# Patient Record
Sex: FEMALE | Race: WHITE | NOT HISPANIC OR LATINO | Employment: OTHER | URBAN - NONMETROPOLITAN AREA
[De-identification: names, ages, dates, MRNs, and addresses within clinical notes are randomized per-mention and may not be internally consistent; named-entity substitution may affect disease eponyms.]

---

## 2021-08-31 ENCOUNTER — HOSPITAL ENCOUNTER (OUTPATIENT)
Dept: INFUSION THERAPY | Facility: HOSPITAL | Age: 66
Discharge: HOME | End: 2021-08-31
Admitting: NURSE PRACTITIONER

## 2021-08-31 VITALS
SYSTOLIC BLOOD PRESSURE: 100 MMHG | RESPIRATION RATE: 20 BRPM | TEMPERATURE: 97.8 F | HEART RATE: 60 BPM | DIASTOLIC BLOOD PRESSURE: 70 MMHG

## 2021-08-31 DIAGNOSIS — U07.1 COVID-19: Primary | ICD-10-CM

## 2021-08-31 PROCEDURE — M0243 CASIRIVI AND IMDEVI INFUSION: HCPCS | Performed by: NURSE PRACTITIONER

## 2021-08-31 PROCEDURE — 25010000006 INJECTION, CASIRIVIMAB AND IMDEVIMAB, 1200 MG: Performed by: NURSE PRACTITIONER

## 2021-08-31 RX ORDER — METHYLPREDNISOLONE SODIUM SUCCINATE 125 MG/2ML
125 INJECTION, POWDER, LYOPHILIZED, FOR SOLUTION INTRAMUSCULAR; INTRAVENOUS AS NEEDED
Status: DISCONTINUED | OUTPATIENT
Start: 2021-08-31 | End: 2021-09-02 | Stop reason: HOSPADM

## 2021-08-31 RX ORDER — DIPHENHYDRAMINE HYDROCHLORIDE 50 MG/ML
50 INJECTION INTRAMUSCULAR; INTRAVENOUS AS NEEDED
Status: CANCELLED | OUTPATIENT
Start: 2021-08-31

## 2021-08-31 RX ORDER — EPINEPHRINE 1 MG/ML
0.3 INJECTION, SOLUTION INTRAMUSCULAR; SUBCUTANEOUS AS NEEDED
Status: CANCELLED | OUTPATIENT
Start: 2021-08-31

## 2021-08-31 RX ORDER — METHYLPREDNISOLONE SODIUM SUCCINATE 125 MG/2ML
125 INJECTION, POWDER, LYOPHILIZED, FOR SOLUTION INTRAMUSCULAR; INTRAVENOUS AS NEEDED
Status: CANCELLED | OUTPATIENT
Start: 2021-08-31

## 2021-08-31 RX ORDER — DIPHENHYDRAMINE HYDROCHLORIDE 50 MG/ML
50 INJECTION INTRAMUSCULAR; INTRAVENOUS AS NEEDED
Status: DISCONTINUED | OUTPATIENT
Start: 2021-08-31 | End: 2021-09-02 | Stop reason: HOSPADM

## 2021-08-31 RX ORDER — EPINEPHRINE 1 MG/ML
0.3 INJECTION, SOLUTION INTRAMUSCULAR; SUBCUTANEOUS AS NEEDED
Status: DISCONTINUED | OUTPATIENT
Start: 2021-08-31 | End: 2021-09-02 | Stop reason: HOSPADM

## 2021-08-31 RX ADMIN — CASIRIVIMAB AND IMDEVIMAB: 600; 600 INJECTION, SOLUTION, CONCENTRATE INTRAVENOUS at 11:55

## 2021-08-31 NOTE — NURSING NOTE
Pt presented to unit for [facility] infusion. Teaching performed on med including s/s of reaction to report. Covid isolation precautions in use.

## 2021-08-31 NOTE — NURSING NOTE
No s/s of reaction ntd. Denies any c/o. Teaching performed with discharge instructions provided including s/s of reaction. Discharged home

## 2022-08-15 ENCOUNTER — NURSE NAVIGATOR (OUTPATIENT)
Dept: PULMONOLOGY | Facility: CLINIC | Age: 67
End: 2022-08-15

## 2022-08-15 ENCOUNTER — OFFICE VISIT (OUTPATIENT)
Dept: PULMONOLOGY | Facility: CLINIC | Age: 67
End: 2022-08-15

## 2022-08-15 VITALS
BODY MASS INDEX: 22.54 KG/M2 | HEIGHT: 63 IN | TEMPERATURE: 96.6 F | WEIGHT: 127.2 LBS | OXYGEN SATURATION: 95 % | DIASTOLIC BLOOD PRESSURE: 90 MMHG | SYSTOLIC BLOOD PRESSURE: 158 MMHG | HEART RATE: 89 BPM

## 2022-08-15 DIAGNOSIS — R63.4 WEIGHT LOSS, UNINTENTIONAL: ICD-10-CM

## 2022-08-15 DIAGNOSIS — J44.9 COPD MIXED TYPE: ICD-10-CM

## 2022-08-15 DIAGNOSIS — R91.1 PULMONARY NODULE: Primary | ICD-10-CM

## 2022-08-15 DIAGNOSIS — Z95.0 HISTORY OF CARDIAC PACEMAKER: ICD-10-CM

## 2022-08-15 DIAGNOSIS — Z72.0 TOBACCO ABUSE: ICD-10-CM

## 2022-08-15 DIAGNOSIS — R59.0 HILAR ADENOPATHY: ICD-10-CM

## 2022-08-15 PROCEDURE — 99205 OFFICE O/P NEW HI 60 MIN: CPT | Performed by: INTERNAL MEDICINE

## 2022-08-15 RX ORDER — BENAZEPRIL HYDROCHLORIDE 20 MG/1
TABLET ORAL
COMMUNITY
Start: 2022-06-10

## 2022-08-15 RX ORDER — ATORVASTATIN CALCIUM 40 MG/1
TABLET, FILM COATED ORAL
COMMUNITY
Start: 2022-07-29

## 2022-08-15 RX ORDER — ALBUTEROL SULFATE 90 UG/1
AEROSOL, METERED RESPIRATORY (INHALATION)
COMMUNITY
Start: 2022-06-10

## 2022-08-15 RX ORDER — MONTELUKAST SODIUM 10 MG/1
TABLET ORAL
COMMUNITY
Start: 2022-07-29

## 2022-08-15 RX ORDER — LEVOCETIRIZINE DIHYDROCHLORIDE 5 MG/1
TABLET, FILM COATED ORAL
COMMUNITY
Start: 2022-08-14

## 2022-08-15 RX ORDER — ATENOLOL 50 MG/1
TABLET ORAL
COMMUNITY
Start: 2022-07-25

## 2022-08-15 NOTE — PROGRESS NOTES
Nurse Navigator met with patient on this date.  Pt was referred to lung nodule clinic to be seen in new consultation with Dr. Murrieta following an abnormal chest CT revealed a 1.1cm RLL nodule.  Dr. Murrieta reviewed and gave printed education materials on lung nodules.  Dr. Murrieta's recommendations and plans are to complete a bronchoscopy and PET scan.  The role of the NN introduced to patient and family.  NN contact information provided and encouraged for pt to call with any questions or concerns.  Pt verbalized understanding and is in agreement with plan of care.

## 2022-08-15 NOTE — PROGRESS NOTES
PULMONARY  NOTE    Chief Complaint     Pulmonary nodule, mediastinal adenopathy, tobacco abuse, weight loss, recent respiratory tract infection    History of Present Illness     67-year-old female referred for evaluation of an abnormal CT scan of the chest    She has a long history of tobacco abuse which is ongoing  Does not have plans for smoking cessation at this time    She had acute exacerbation of COPD earlier this month  She underwent a chest x-ray which was abnormal followed by a CT scan of the chest  The results are as noted below    She feels that she is doing much better after her treatment and is now using Anoro with albuterol on an as-needed basis  She has not had to use the albuterol very often since she has improved    She has a regular cough but she is never had hemoptysis    She has experienced some weight loss but she is not sure how much weight she has actually lost    Patient Active Problem List   Diagnosis   • COVID-19   • Pulmonary nodule (11mm RLL)   • Right hilar adenopathy   • COPD (severity ?)   • Tobacco abuse   • Weight loss, unintentional (How much ?)   • History of cardiac pacemaker     No Known Allergies    Current Outpatient Medications:   •  albuterol sulfate  (90 Base) MCG/ACT inhaler, , Disp: , Rfl:   •  Anoro Ellipta 62.5-25 MCG/INH aerosol powder  inhaler, , Disp: , Rfl:   •  atenolol (TENORMIN) 50 MG tablet, , Disp: , Rfl:   •  atorvastatin (LIPITOR) 40 MG tablet, , Disp: , Rfl:   •  benazepril (LOTENSIN) 20 MG tablet, , Disp: , Rfl:   •  Diclofenac Sodium (VOLTAREN) 1 % gel gel, , Disp: , Rfl:   •  levocetirizine (XYZAL) 5 MG tablet, , Disp: , Rfl:   •  montelukast (SINGULAIR) 10 MG tablet, , Disp: , Rfl:   MEDICATION LIST AND ALLERGIES REVIEWED.    Family History   Problem Relation Age of Onset   • Cancer Mother    • Heart attack Father      Social History     Tobacco Use   • Smoking status: Current Every Day Smoker     Packs/day: 0.50     Years: 20.00     Pack years:  "10.00   • Smokeless tobacco: Never Used   • Tobacco comment: 1  pack per day history   Vaping Use   • Vaping Use: Never used   Substance Use Topics   • Alcohol use: Never   • Drug use: Never     Social History     Social History Narrative        Considered disabled    Continues to smoke.  Began smoking at age 18    Typically has always smoked less than 1 pack cigarettes per day    No smoking cessation plans, yet     FAMILY AND SOCIAL HISTORY REVIEWED.    Review of Systems  ALSO REFER TO SCANNED ROS SHEET FROM SAME DATE.    /90 (BP Location: Left arm, Patient Position: Sitting)   Pulse 89   Temp 96.6 °F (35.9 °C)   Ht 160 cm (63\")   Wt 57.7 kg (127 lb 3.2 oz)   SpO2 95%   BMI 22.53 kg/m²   Physical Exam  Vitals and nursing note reviewed.   Constitutional:       General: She is not in acute distress.     Appearance: She is well-developed. She is not diaphoretic.   HENT:      Head: Normocephalic and atraumatic.   Neck:      Thyroid: No thyromegaly.   Cardiovascular:      Rate and Rhythm: Normal rate and regular rhythm.      Heart sounds: No murmur heard.  Pulmonary:      Effort: Pulmonary effort is normal.      Breath sounds: Normal breath sounds. No stridor.   Chest:   Breasts:      Right: No supraclavicular adenopathy.      Left: No supraclavicular adenopathy.       Abdominal:      Palpations: Abdomen is soft.   Lymphadenopathy:      Cervical: No cervical adenopathy.      Upper Body:      Right upper body: No supraclavicular or epitrochlear adenopathy.      Left upper body: No supraclavicular or epitrochlear adenopathy.   Skin:     General: Skin is warm and dry.   Neurological:      Mental Status: She is alert and oriented to person, place, and time.   Psychiatric:         Behavior: Behavior normal.         Results     CT scan of the chest reviewed on PACS  This is an outside film  Quite bulky right hilar and right sided mediastinal adenopathy noted  Approximately 11 mm peripheral right lower lobe " noncalcified well-circumscribed pulmonary nodule      There is no immunization history on file for this patient.  Problem List       ICD-10-CM ICD-9-CM   1. Pulmonary nodule (11mm RLL)  R91.1 793.11   2. Right hilar adenopathy  R59.0 785.6   3. COPD (severity ?)  J44.9 496   4. Tobacco abuse  Z72.0 305.1   5. Weight loss, unintentional (How much ?)  R63.4 783.21   6. History of cardiac pacemaker  Z95.0 V12.50     V45.01       Discussion     We discussed her chest imaging  Her family accompanied her to the office today  She is a pulmonary nodule with pathologically enlarged hilar and right sided mediastinal lymphadenopathy  This is certainly a worrisome picture for a primary bronchogenic carcinoma with local or regional spread  The pulmonary nodule could be benign, however, and other etiologies for her mediastinal lymphadenopathy need to be considered, such as lymphoma  This would be an atypical picture for a post COVID infection    We discussed work-up  At some point a PET scan will probably be useful but will not establish a diagnosis  We discussed biopsy options  This includes CT FNA, surgical lung biopsies and bronchoscopy with biopsy  We discussed the risk and benefits of these options including the risks of pneumothorax and bleeding    I cannot get at the right lung lesion without navigational assistance but we should be able to get diagnostic samples from the lymph nodes through a bronchoscopy with EBUS  This can be performed in [place]  We will try to get that set up for next week    I will submit a request for a PET scan although that is not going to be pivotal or diagnostic until we have a tissue diagnosis of    She is going to remain on the same bronchodilator regimen including Anoro with albuterol    Would recommend smoking cessation    I will plan to see her back after the above    Level of service justified based on 63 minutes spent in patient care on this date of service including, but not limited to:  preparing to see the patient, obtaining and/or reviewing history, performing medically appropriate examination, ordering tests/medicine/procedures, independently interpreting results, documenting clinical information in EHR, and counseling/education of patient/family/caregiver (excluding time spent on other separate services such as performing procedures or test interpretation, if applicable). (Level 4 45-59 minutes; Level 5 60-74 minutes)    Amari Murrieta MD  Note electronically signed    CC: Cristofer Pearce MD

## 2022-08-16 ENCOUNTER — NURSE NAVIGATOR (OUTPATIENT)
Dept: PULMONOLOGY | Facility: CLINIC | Age: 67
End: 2022-08-16

## 2022-08-16 DIAGNOSIS — Z00.6 EXAMINATION FOR NORMAL COMPARISON OR CONTROL IN CLINICAL RESEARCH: Primary | ICD-10-CM

## 2022-08-16 NOTE — PROGRESS NOTES
Nurse Navigator called to patient on this date.  Informed patient of PAT appointment to be Friday, 8/19 at 3:15pm at the outpatient surgery center and for her bronchoscopy appointment to be Monday, 8/22 at 7:00am, also at the outpatient surgery center.  All information of dates/times confirmed and read back by patient to NN.  NN contact information provided and encouraged for patient to call with any questions or concerns.  Pt verbalized understanding and is in agreement with plan of care.

## 2022-08-17 NOTE — DISCHARGE INSTRUCTIONS
8/22/22      0700   ARRIVAL TIME   TAKE the following medications the morning of surgery:  All heart or blood pressure medications    HOLD all diabetic medications the morning of surgery as ordered by physician.    Please discontinue all blood thinners and anticoagulants (except aspirin) prior to surgery as per your surgeon and cardiologist instructions.  Aspirin may be continued up to the day prior to surgery.     CHLORHEXIDINE CLOTHS GIVEN WITH INSTRUCTIONS AND FORM TO RETURN TO HOSPITAL, IF APPLICABLE.    General Instructions:  Do not eat or drink after midnight: includes water, mints, or gum. You may brush your teeth.  Dental appliances that are removable must be taken out day of surgery.  Do not smoke, chew tobacco, or drink alcohol.  Bring medications in original bottles, any inhalers and if applicable your C-PAP/BI-PAP machine.  Bring any papers given to you in the doctor's office.  Wear clean comfortable clothes and socks.  Do not wear contact lenses or make-up. Bring a case for your glasses if applicable.  Bring crutches or walker if applicable.  Leave all other valuables and jewelry at home.    If you were given a blood bank ID arm band remember to bring it with you the day of surgery.    Preventing a Surgical Site Infection:  Shower the night before surgery (unless instructed other wise) using a fresh bar of anti-bacterial soap (such as Dial) and clean washcloth. Dry with a clean towel and dress in clean clothing.  For 2 to 3 days before surgery, avoid shaving with a razor near where you will have surgery because the razor can irritate skin and make it easier to develop an infection. Ask your surgeon if you will be receiving antibiotics prior to surgery.  Make sure you, your family, and all healthcare providers clear their hands with soap and water or an alcohol-based hand  before caring for you or your wound.  If at all possible, quit smoking as many days before surgery as you can.    Day of  surgery:  Upon arrival, a Pre-op nurse and Anesthesiologist will review your health history, obtain vital signs, and answer questions you may have. The only belongings needed at this time will be your home medications and if applicable your C-PAP/BI-PAP machine. If you are staying overnight your family can leave the rest of your belongings in the car and bring them to your room later. A Pre-op nurse will start an IV and you may receive medication in preparation for surgery, including something to help you relax. Your family will be able to see you in the Pre-op area. While you are in surgery your family should notify the waiting room  if they leave the waiting room area and provide a contact phone number.    Please be aware that surgery does come with discomfort. We want to make every effort to control your discomfort so please discuss any uncontrolled symptoms with your nurse. Your doctor will most likely have prescribed pain medications.    If you are going home after surgery you will receive individualized written care instructions before being discharged. A responsible adult must drive you to and from the hospital on the day of surgery and stay with you for 24 hours.    If you are staying overnight following surgery, you will be transported to your hospital room following the recovery period.  [facility] has all private rooms.    If you have any questions please call Pre-Admission Testing at [phone].  Deductibles and co-payments are collected on the day of service. Please be prepared to pay the required co-pay, deductible or deposit on the day of service as defined by your plan.    A RESPONSIBLE PERSON MUST REMAIN IN THE WAITING ROOM DURING YOUR PROCEDURE AND A RESPONSIBLE  MUST BE AVAILABLE UPON YOUR DISCHARGE.

## 2022-08-19 ENCOUNTER — PRE-ADMISSION TESTING (OUTPATIENT)
Dept: PREADMISSION TESTING | Facility: HOSPITAL | Age: 67
End: 2022-08-19

## 2022-08-19 LAB
ANION GAP SERPL CALCULATED.3IONS-SCNC: 11.7 MMOL/L (ref 5–15)
APTT PPP: 27 SECONDS (ref 26.5–34.5)
BUN SERPL-MCNC: 10 MG/DL (ref 8–23)
BUN/CREAT SERPL: 11.9 (ref 7–25)
CALCIUM SPEC-SCNC: 9.8 MG/DL (ref 8.6–10.5)
CHLORIDE SERPL-SCNC: 104 MMOL/L (ref 98–107)
CO2 SERPL-SCNC: 21.3 MMOL/L (ref 22–29)
CREAT SERPL-MCNC: 0.84 MG/DL (ref 0.57–1)
DEPRECATED RDW RBC AUTO: 43.8 FL (ref 37–54)
EGFRCR SERPLBLD CKD-EPI 2021: 77.2 ML/MIN/1.73
ERYTHROCYTE [DISTWIDTH] IN BLOOD BY AUTOMATED COUNT: 12.7 % (ref 12.3–15.4)
GLUCOSE SERPL-MCNC: 100 MG/DL (ref 65–99)
HCT VFR BLD AUTO: 39.5 % (ref 34–46.6)
HGB BLD-MCNC: 13.1 G/DL (ref 12–15.9)
MCH RBC QN AUTO: 30.8 PG (ref 26.6–33)
MCHC RBC AUTO-ENTMCNC: 33.2 G/DL (ref 31.5–35.7)
MCV RBC AUTO: 92.9 FL (ref 79–97)
PLATELET # BLD AUTO: 223 10*3/MM3 (ref 140–450)
PMV BLD AUTO: 9.6 FL (ref 6–12)
POTASSIUM SERPL-SCNC: 5 MMOL/L (ref 3.5–5.2)
RBC # BLD AUTO: 4.25 10*6/MM3 (ref 3.77–5.28)
SODIUM SERPL-SCNC: 137 MMOL/L (ref 136–145)
WBC NRBC COR # BLD: 7.68 10*3/MM3 (ref 3.4–10.8)

## 2022-08-19 PROCEDURE — 93005 ELECTROCARDIOGRAM TRACING: CPT

## 2022-08-19 PROCEDURE — 85730 THROMBOPLASTIN TIME PARTIAL: CPT

## 2022-08-19 PROCEDURE — 85027 COMPLETE CBC AUTOMATED: CPT

## 2022-08-19 PROCEDURE — 36415 COLL VENOUS BLD VENIPUNCTURE: CPT

## 2022-08-19 PROCEDURE — 80048 BASIC METABOLIC PNL TOTAL CA: CPT

## 2022-08-20 LAB
QT INTERVAL: 392 MS
QTC INTERVAL: 394 MS

## 2022-08-22 ENCOUNTER — HOSPITAL ENCOUNTER (OUTPATIENT)
Facility: HOSPITAL | Age: 67
Setting detail: HOSPITAL OUTPATIENT SURGERY
Discharge: HOME | End: 2022-08-22
Attending: INTERNAL MEDICINE | Admitting: INTERNAL MEDICINE

## 2022-08-22 ENCOUNTER — ANESTHESIA EVENT (OUTPATIENT)
Dept: PERIOP | Facility: HOSPITAL | Age: 67
End: 2022-08-22

## 2022-08-22 ENCOUNTER — ANESTHESIA (OUTPATIENT)
Dept: PERIOP | Facility: HOSPITAL | Age: 67
End: 2022-08-22

## 2022-08-22 VITALS
HEIGHT: 63 IN | HEART RATE: 97 BPM | DIASTOLIC BLOOD PRESSURE: 94 MMHG | TEMPERATURE: 98 F | BODY MASS INDEX: 22.5 KG/M2 | OXYGEN SATURATION: 95 % | SYSTOLIC BLOOD PRESSURE: 150 MMHG | WEIGHT: 127 LBS | RESPIRATION RATE: 20 BRPM

## 2022-08-22 DIAGNOSIS — Z72.0 TOBACCO ABUSE: ICD-10-CM

## 2022-08-22 DIAGNOSIS — J44.9 COPD MIXED TYPE: ICD-10-CM

## 2022-08-22 DIAGNOSIS — R59.0 HILAR ADENOPATHY: ICD-10-CM

## 2022-08-22 DIAGNOSIS — Z95.0 HISTORY OF CARDIAC PACEMAKER: ICD-10-CM

## 2022-08-22 DIAGNOSIS — R91.1 PULMONARY NODULE: ICD-10-CM

## 2022-08-22 DIAGNOSIS — R63.4 WEIGHT LOSS, UNINTENTIONAL: ICD-10-CM

## 2022-08-22 PROCEDURE — 88360 TUMOR IMMUNOHISTOCHEM/MANUAL: CPT

## 2022-08-22 PROCEDURE — 88112 CYTOPATH CELL ENHANCE TECH: CPT

## 2022-08-22 PROCEDURE — 31625 BRONCHOSCOPY W/BIOPSY(S): CPT | Performed by: INTERNAL MEDICINE

## 2022-08-22 PROCEDURE — 87102 FUNGUS ISOLATION CULTURE: CPT | Performed by: INTERNAL MEDICINE

## 2022-08-22 PROCEDURE — 31652 BRONCH EBUS SAMPLNG 1/2 NODE: CPT | Performed by: INTERNAL MEDICINE

## 2022-08-22 PROCEDURE — 87205 SMEAR GRAM STAIN: CPT | Performed by: INTERNAL MEDICINE

## 2022-08-22 PROCEDURE — 25010000002 SUCCINYLCHOLINE PER 20 MG: Performed by: NURSE ANESTHETIST, CERTIFIED REGISTERED

## 2022-08-22 PROCEDURE — 25010000002 ONDANSETRON PER 1 MG: Performed by: NURSE ANESTHETIST, CERTIFIED REGISTERED

## 2022-08-22 PROCEDURE — 87071 CULTURE AEROBIC QUANT OTHER: CPT | Performed by: INTERNAL MEDICINE

## 2022-08-22 PROCEDURE — 88342 IMHCHEM/IMCYTCHM 1ST ANTB: CPT

## 2022-08-22 PROCEDURE — 87206 SMEAR FLUORESCENT/ACID STAI: CPT | Performed by: INTERNAL MEDICINE

## 2022-08-22 PROCEDURE — 25010000002 PROPOFOL 10 MG/ML EMULSION: Performed by: NURSE ANESTHETIST, CERTIFIED REGISTERED

## 2022-08-22 PROCEDURE — 31624 DX BRONCHOSCOPE/LAVAGE: CPT | Performed by: INTERNAL MEDICINE

## 2022-08-22 PROCEDURE — 87116 MYCOBACTERIA CULTURE: CPT | Performed by: INTERNAL MEDICINE

## 2022-08-22 PROCEDURE — 88341 IMHCHEM/IMCYTCHM EA ADD ANTB: CPT

## 2022-08-22 PROCEDURE — 88305 TISSUE EXAM BY PATHOLOGIST: CPT

## 2022-08-22 PROCEDURE — 31623 DX BRONCHOSCOPE/BRUSH: CPT | Performed by: INTERNAL MEDICINE

## 2022-08-22 PROCEDURE — 88173 CYTOPATH EVAL FNA REPORT: CPT

## 2022-08-22 RX ORDER — FENTANYL CITRATE 50 UG/ML
50 INJECTION, SOLUTION INTRAMUSCULAR; INTRAVENOUS
Status: DISCONTINUED | OUTPATIENT
Start: 2022-08-22 | End: 2022-08-22 | Stop reason: HOSPADM

## 2022-08-22 RX ORDER — IPRATROPIUM BROMIDE AND ALBUTEROL SULFATE 2.5; .5 MG/3ML; MG/3ML
3 SOLUTION RESPIRATORY (INHALATION) ONCE AS NEEDED
Status: DISCONTINUED | OUTPATIENT
Start: 2022-08-22 | End: 2022-08-22 | Stop reason: HOSPADM

## 2022-08-22 RX ORDER — ONDANSETRON 2 MG/ML
INJECTION INTRAMUSCULAR; INTRAVENOUS AS NEEDED
Status: DISCONTINUED | OUTPATIENT
Start: 2022-08-22 | End: 2022-08-22 | Stop reason: SURG

## 2022-08-22 RX ORDER — SODIUM CHLORIDE, SODIUM LACTATE, POTASSIUM CHLORIDE, CALCIUM CHLORIDE 600; 310; 30; 20 MG/100ML; MG/100ML; MG/100ML; MG/100ML
125 INJECTION, SOLUTION INTRAVENOUS ONCE
Status: DISCONTINUED | OUTPATIENT
Start: 2022-08-22 | End: 2022-08-22 | Stop reason: HOSPADM

## 2022-08-22 RX ORDER — OXYCODONE HYDROCHLORIDE AND ACETAMINOPHEN 5; 325 MG/1; MG/1
1 TABLET ORAL ONCE AS NEEDED
Status: DISCONTINUED | OUTPATIENT
Start: 2022-08-22 | End: 2022-08-22 | Stop reason: HOSPADM

## 2022-08-22 RX ORDER — ONDANSETRON 2 MG/ML
4 INJECTION INTRAMUSCULAR; INTRAVENOUS AS NEEDED
Status: DISCONTINUED | OUTPATIENT
Start: 2022-08-22 | End: 2022-08-22 | Stop reason: HOSPADM

## 2022-08-22 RX ORDER — FAMOTIDINE 10 MG/ML
INJECTION, SOLUTION INTRAVENOUS AS NEEDED
Status: DISCONTINUED | OUTPATIENT
Start: 2022-08-22 | End: 2022-08-22 | Stop reason: SURG

## 2022-08-22 RX ORDER — SODIUM CHLORIDE 0.9 % (FLUSH) 0.9 %
10 SYRINGE (ML) INJECTION AS NEEDED
Status: DISCONTINUED | OUTPATIENT
Start: 2022-08-22 | End: 2022-08-22 | Stop reason: HOSPADM

## 2022-08-22 RX ORDER — MIDAZOLAM HYDROCHLORIDE 1 MG/ML
1 INJECTION INTRAMUSCULAR; INTRAVENOUS
Status: DISCONTINUED | OUTPATIENT
Start: 2022-08-22 | End: 2022-08-22 | Stop reason: HOSPADM

## 2022-08-22 RX ORDER — SODIUM CHLORIDE 9 MG/ML
INJECTION, SOLUTION INTRAVENOUS AS NEEDED
Status: DISCONTINUED | OUTPATIENT
Start: 2022-08-22 | End: 2022-08-22 | Stop reason: HOSPADM

## 2022-08-22 RX ORDER — SODIUM CHLORIDE, SODIUM LACTATE, POTASSIUM CHLORIDE, CALCIUM CHLORIDE 600; 310; 30; 20 MG/100ML; MG/100ML; MG/100ML; MG/100ML
100 INJECTION, SOLUTION INTRAVENOUS ONCE AS NEEDED
Status: DISCONTINUED | OUTPATIENT
Start: 2022-08-22 | End: 2022-08-22 | Stop reason: HOSPADM

## 2022-08-22 RX ORDER — MEPERIDINE HYDROCHLORIDE 25 MG/ML
12.5 INJECTION INTRAMUSCULAR; INTRAVENOUS; SUBCUTANEOUS
Status: DISCONTINUED | OUTPATIENT
Start: 2022-08-22 | End: 2022-08-22 | Stop reason: HOSPADM

## 2022-08-22 RX ORDER — PROPOFOL 10 MG/ML
VIAL (ML) INTRAVENOUS AS NEEDED
Status: DISCONTINUED | OUTPATIENT
Start: 2022-08-22 | End: 2022-08-22 | Stop reason: SURG

## 2022-08-22 RX ORDER — MIDAZOLAM HYDROCHLORIDE 1 MG/ML
0.5 INJECTION INTRAMUSCULAR; INTRAVENOUS
Status: DISCONTINUED | OUTPATIENT
Start: 2022-08-22 | End: 2022-08-22 | Stop reason: HOSPADM

## 2022-08-22 RX ORDER — SODIUM CHLORIDE, SODIUM LACTATE, POTASSIUM CHLORIDE, CALCIUM CHLORIDE 600; 310; 30; 20 MG/100ML; MG/100ML; MG/100ML; MG/100ML
INJECTION, SOLUTION INTRAVENOUS CONTINUOUS PRN
Status: DISCONTINUED | OUTPATIENT
Start: 2022-08-22 | End: 2022-08-22 | Stop reason: SURG

## 2022-08-22 RX ORDER — SUCCINYLCHOLINE CHLORIDE 20 MG/ML
INJECTION INTRAMUSCULAR; INTRAVENOUS AS NEEDED
Status: DISCONTINUED | OUTPATIENT
Start: 2022-08-22 | End: 2022-08-22 | Stop reason: SURG

## 2022-08-22 RX ORDER — SODIUM CHLORIDE 0.9 % (FLUSH) 0.9 %
10 SYRINGE (ML) INJECTION EVERY 12 HOURS SCHEDULED
Status: DISCONTINUED | OUTPATIENT
Start: 2022-08-22 | End: 2022-08-22 | Stop reason: HOSPADM

## 2022-08-22 RX ORDER — KETOROLAC TROMETHAMINE 30 MG/ML
30 INJECTION, SOLUTION INTRAMUSCULAR; INTRAVENOUS EVERY 6 HOURS PRN
Status: DISCONTINUED | OUTPATIENT
Start: 2022-08-22 | End: 2022-08-22 | Stop reason: HOSPADM

## 2022-08-22 RX ORDER — LIDOCAINE HYDROCHLORIDE 20 MG/ML
INJECTION, SOLUTION INFILTRATION; PERINEURAL AS NEEDED
Status: DISCONTINUED | OUTPATIENT
Start: 2022-08-22 | End: 2022-08-22 | Stop reason: SURG

## 2022-08-22 RX ADMIN — SUCCINYLCHOLINE CHLORIDE 100 MG: 20 INJECTION, SOLUTION INTRAMUSCULAR; INTRAVENOUS at 08:07

## 2022-08-22 RX ADMIN — FAMOTIDINE 20 MG: 10 INJECTION INTRAVENOUS at 08:07

## 2022-08-22 RX ADMIN — ONDANSETRON 4 MG: 2 INJECTION INTRAMUSCULAR; INTRAVENOUS at 08:07

## 2022-08-22 RX ADMIN — SODIUM CHLORIDE, POTASSIUM CHLORIDE, SODIUM LACTATE AND CALCIUM CHLORIDE: 600; 310; 30; 20 INJECTION, SOLUTION INTRAVENOUS at 08:03

## 2022-08-22 RX ADMIN — PROPOFOL 120 MG: 10 INJECTION, EMULSION INTRAVENOUS at 08:07

## 2022-08-22 RX ADMIN — LIDOCAINE HYDROCHLORIDE 60 MG: 20 INJECTION, SOLUTION INFILTRATION; PERINEURAL at 08:07

## 2022-08-22 NOTE — H&P
PULMONARY  NOTE     Chief Complaint      Pulmonary nodule, mediastinal adenopathy, tobacco abuse, weight loss, recent respiratory tract infection     History of Present Illness      67-year-old female referred for evaluation of an abnormal CT scan of the chest     She has a long history of tobacco abuse which is ongoing  Does not have plans for smoking cessation at this time     She had acute exacerbation of COPD earlier this month  She underwent a chest x-ray which was abnormal followed by a CT scan of the chest  The results are as noted below     She feels that she is doing much better after her treatment and is now using Anoro with albuterol on an as-needed basis  She has not had to use the albuterol very often since she has improved     She has a regular cough but she is never had hemoptysis     She has experienced some weight loss but she is not sure how much weight she has actually lost         Patient Active Problem List   Diagnosis   • COVID-19   • Pulmonary nodule (11mm RLL)   • Right hilar adenopathy   • COPD (severity ?)   • Tobacco abuse   • Weight loss, unintentional (How much ?)   • History of cardiac pacemaker      No Known Allergies     Current Outpatient Medications:   •  albuterol sulfate  (90 Base) MCG/ACT inhaler, , Disp: , Rfl:   •  Anoro Ellipta 62.5-25 MCG/INH aerosol powder  inhaler, , Disp: , Rfl:   •  atenolol (TENORMIN) 50 MG tablet, , Disp: , Rfl:   •  atorvastatin (LIPITOR) 40 MG tablet, , Disp: , Rfl:   •  benazepril (LOTENSIN) 20 MG tablet, , Disp: , Rfl:   •  Diclofenac Sodium (VOLTAREN) 1 % gel gel, , Disp: , Rfl:   •  levocetirizine (XYZAL) 5 MG tablet, , Disp: , Rfl:   •  montelukast (SINGULAIR) 10 MG tablet, , Disp: , Rfl:   MEDICATION LIST AND ALLERGIES REVIEWED.           Family History   Problem Relation Age of Onset   • Cancer Mother     • Heart attack Father        Social History            Tobacco Use   • Smoking status: Current Every Day Smoker       Packs/day: 0.50  "      Years: 20.00       Pack years: 10.00   • Smokeless tobacco: Never Used   • Tobacco comment: 1  pack per day history   Vaping Use   • Vaping Use: Never used   Substance Use Topics   • Alcohol use: Never   • Drug use: Never      Social History          Social History Narrative          Considered disabled     Continues to smoke.  Began smoking at age 18     Typically has always smoked less than 1 pack cigarettes per day     No smoking cessation plans, yet      FAMILY AND SOCIAL HISTORY REVIEWED.     Review of Systems  ROS performed. Nothing to add above HPI.     /90 (BP Location: Left arm, Patient Position: Sitting)   Pulse 89   Temp 96.6 °F (35.9 °C)   Ht 160 cm (63\")   Wt 57.7 kg (127 lb 3.2 oz)   SpO2 95%   BMI 22.53 kg/m²   Physical Exam  Vitals and nursing note reviewed.   Constitutional:       General: She is not in acute distress.     Appearance: She is well-developed. She is not diaphoretic.   HENT:      Head: Normocephalic and atraumatic.   Neck:      Thyroid: No thyromegaly.   Cardiovascular:      Rate and Rhythm: Normal rate and regular rhythm.      Heart sounds: No murmur heard.  Pulmonary:      Effort: Pulmonary effort is normal.      Breath sounds: Normal breath sounds. No stridor.   Chest:   Breasts:      Right: No supraclavicular adenopathy.      Left: No supraclavicular adenopathy.         Abdominal:      Palpations: Abdomen is soft.   Lymphadenopathy:      Cervical: No cervical adenopathy.      Upper Body:      Right upper body: No supraclavicular or epitrochlear adenopathy.      Left upper body: No supraclavicular or epitrochlear adenopathy.   Skin:     General: Skin is warm and dry.   Neurological:      Mental Status: She is alert and oriented to person, place, and time.   Psychiatric:         Behavior: Behavior normal.            Results      CT scan of the chest reviewed on PACS  This is an outside film  Quite bulky right hilar and right sided mediastinal adenopathy " noted  Approximately 11 mm peripheral right lower lobe noncalcified well-circumscribed pulmonary nodule        There is no immunization history on file for this patient.  Problem List      Visit Diagnosis       ICD-10-CM ICD-9-CM   1. Pulmonary nodule (11mm RLL)  R91.1 793.11   2. Right hilar adenopathy  R59.0 785.6   3. COPD (severity ?)  J44.9 496   4. Tobacco abuse  Z72.0 305.1   5. Weight loss, unintentional (How much ?)  R63.4 783.21   6. History of cardiac pacemaker  Z95.0 V12.50       V45.01            Discussion      We discussed her chest imaging  Her family accompanied her to the office today  She is a pulmonary nodule with pathologically enlarged hilar and right sided mediastinal lymphadenopathy  This is certainly a worrisome picture for a primary bronchogenic carcinoma with local or regional spread  The pulmonary nodule could be benign, however, and other etiologies for her mediastinal lymphadenopathy need to be considered, such as lymphoma  This would be an atypical picture for a post COVID infection     We discussed work-up  At some point a PET scan will probably be useful but will not establish a diagnosis  We discussed biopsy options  This includes CT FNA, surgical lung biopsies and bronchoscopy with biopsy  We discussed the risk and benefits of these options including the risks of pneumothorax and bleeding     I cannot get at the right lung lesion without navigational assistance but we should be able to get diagnostic samples from the lymph nodes through a bronchoscopy with EBUS  This can be performed in [place]  We will try to get that set up for next week     I will submit a request for a PET scan although that is not going to be pivotal or diagnostic until we have a tissue diagnosis of     She is going to remain on the same bronchodilator regimen including Anoro with albuterol     Would recommend smoking cessation    Plan for bronchoscopy today with EBUS.

## 2022-08-22 NOTE — ANESTHESIA PREPROCEDURE EVALUATION
Anesthesia Evaluation     no history of anesthetic complications:  NPO Solid Status: > 8 hours  NPO Liquid Status: > 8 hours           Airway   Mallampati: II  TM distance: >3 FB  Neck ROM: full  No difficulty expected  Dental    (+) edentulous    Pulmonary - normal exam   (+) a smoker, COPD,   Cardiovascular - normal exam    (+) pacemaker, hypertension, CAD, hyperlipidemia,       Neuro/Psych  GI/Hepatic/Renal/Endo      Musculoskeletal     Abdominal  - normal exam    Bowel sounds: normal.   Substance History      OB/GYN          Other                      Anesthesia Plan    ASA 3     general     intravenous induction     Anesthetic plan, risks, benefits, and alternatives have been provided, discussed and informed consent has been obtained with: patient.        CODE STATUS:

## 2022-08-22 NOTE — OP NOTE
Bronchoscopy Procedure Note    Pre-op Diagnosis: Right hilar mass, tobacco abuse    Post-op Diagnosis: Same    Surgeon: Amari Murrieta MD    Anesthesia: General    Operation: Flexible fiberoptic bronchoscopy    Findings: Tumor emanating from the right middle lobe    Specimen(s): Endobronchial biopsies right middle lobe.  BAL right middle lobe.  Cytology brush right middle lobe.  Transbronchial needle aspiration station 12 R and station 7    Estimated Blood Loss: Minimal/None    Complications: No immediate    Indications and History:  Serenity Villegas is a 67 y.o. female  with tobacco abuse and a right hilar mass.  The risks, benefits, complications, treatment options and expected outcomes were discussed with the patient.  The possibilities of reaction to medication, pulmonary aspiration, perforation of a viscus, bleeding, failure to diagnose a condition and creating a complication requiring transfusion or operation were discussed with the patient who freely signed the consent.      Description of Procedure:  The patient was seen in the Holding Room and an immediate patient assessment was done prior to the administration of moderate and/or deep conscious sedation.  The patient was taken to the Endoscopy Suite, identified as Serenity Villegas  and the procedure verified as Flexible Fiberoptic Bronchoscopy.  A Time Out was held and the above information confirmed.    After the induction of general anesthesia the patient was intubated with an 8.0 endotracheal tube    The bronchoscope was introduced via the endotracheal tube which confirmed good position of the distal one third of the trachea    The scope was advanced into the left mainstem bronchus.  The left upper lobe, lingula, left lower lobe, and superior segmental left lower lobe revealed chronic bronchitic changes and moderate thick secretions but no discrete endobronchial lesions    The scope was advanced into the right mainstem bronchus.  The right upper  lobe and bronchus intermedius were unremarkable.  A friable pedunculated mass emanated from the proximal aspect of the right middle lobe takeoff.  The subtotally obstructed the right middle lobe airway.  The airway distal to this lesion was unremarkable.  The scope was wedged into the right middle lobe where a BAL was obtained.  60 mL instilled and 30 mL return.  A 7 mm cytology brushing specimen was obtained as well as endobronchial biopsies of the tumor.    The scope was removed and the endobronchial scope was introduced.  It was advanced down to the 12 R area above the tumor.  Multiple passes were taken with a 21-gauge needle.  Scope was pulled back up more proximal to station 7 were multiple passes were taken again with a 21-gauge needle from enlarged lymphadenopathy.    The EBUS scope was removed and the regular scope was reintroduced.  The airways were suctioned clear.  At the end of the procedure there was no active bleeding and no significant residual blood.  The scope was withdrawn without difficulty.  There are no immediate complications.    The Patient was taken to the Endoscopy Recovery area in satisfactory condition.    Attestation: I performed the procedure    Amari Murrieta MD, [facility]

## 2022-08-22 NOTE — ANESTHESIA POSTPROCEDURE EVALUATION
Patient: Serenity Villegas    Procedure Summary     Date: 08/22/22 Room / Location:  [facility] OR [address] [place] [address] [facility]    Anesthesia Start: 0803 Anesthesia Stop: 0830    Procedure: BRONCHOSCOPY WITH ENDOBRONCHIAL ULTRASOUND (N/A Bronchus) Diagnosis:       Pulmonary nodule      Hilar adenopathy      COPD mixed type (HCC)      Tobacco abuse      Weight loss, unintentional      History of cardiac pacemaker      (Pulmonary nodule [R91.1])      (Hilar adenopathy [R59.0])      (COPD mixed type (HCC) [J44.9])      (Tobacco abuse [Z72.0])      (Weight loss, unintentional [R63.4])      (History of cardiac pacemaker [Z95.0])    Surgeons: Amari Murrieta MD Provider: Anshu Laird MD    Anesthesia Type: general ASA Status: 3          Anesthesia Type: general    Vitals  No vitals data found for the desired time range.          Post Anesthesia Care and Evaluation    Patient location during evaluation: PHASE II  Patient participation: complete - patient participated  Level of consciousness: awake and alert  Pain score: 0  Pain management: adequate    Airway patency: patent  Anesthetic complications: No anesthetic complications    Cardiovascular status: acceptable  Respiratory status: acceptable  Hydration status: acceptable

## 2022-08-22 NOTE — ANESTHESIA PROCEDURE NOTES
Airway  Urgency: elective    Date/Time: 8/22/2022 8:07 AM  Airway not difficult    General Information and Staff    Patient location during procedure: OR  CRNA/CAA: Ac Adkins CRNA    Indications and Patient Condition  Indications for airway management: airway protection    Preoxygenated: yes  MILS maintained throughout  Mask difficulty assessment: 0 - not attempted    Final Airway Details  Final airway type: endotracheal airway      Successful airway: ETT  Cuffed: yes   Successful intubation technique: direct laryngoscopy  Endotracheal tube insertion site: oral  Blade: Annie  Blade size: 3  ETT size (mm): 8.0  Cormack-Lehane Classification: grade I - full view of glottis  Measured from: lips  ETT/EBT  to lips (cm): 24  Number of attempts at approach: 1  Assessment: lips, teeth, and gum same as pre-op and atraumatic intubation    Additional Comments  Dentition & lips unchanged from preop

## 2022-08-24 ENCOUNTER — HOSPITAL ENCOUNTER (OUTPATIENT)
Dept: PET IMAGING | Facility: HOSPITAL | Age: 67
Discharge: HOME | End: 2022-08-24
Admitting: INTERNAL MEDICINE

## 2022-08-24 DIAGNOSIS — Z72.0 TOBACCO ABUSE: ICD-10-CM

## 2022-08-24 DIAGNOSIS — J44.9 COPD MIXED TYPE: ICD-10-CM

## 2022-08-24 DIAGNOSIS — R63.4 WEIGHT LOSS, UNINTENTIONAL: ICD-10-CM

## 2022-08-24 DIAGNOSIS — R59.0 HILAR ADENOPATHY: ICD-10-CM

## 2022-08-24 DIAGNOSIS — R91.1 PULMONARY NODULE: ICD-10-CM

## 2022-08-24 LAB
BACTERIA SPEC AEROBE CULT: NO GROWTH
GRAM STN SPEC: NORMAL
GRAM STN SPEC: NORMAL
REF LAB TEST METHOD: NORMAL
REF LAB TEST METHOD: NORMAL

## 2022-08-24 PROCEDURE — 78815 PET IMAGE W/CT SKULL-THIGH: CPT | Performed by: RADIOLOGY

## 2022-08-24 PROCEDURE — A9552 F18 FDG: HCPCS | Performed by: INTERNAL MEDICINE

## 2022-08-24 PROCEDURE — 78815 PET IMAGE W/CT SKULL-THIGH: CPT

## 2022-08-24 PROCEDURE — 0 FLUDEOXYGLUCOSE F18 SOLUTION: Performed by: INTERNAL MEDICINE

## 2022-08-24 RX ADMIN — FLUDEOXYGLUCOSE F18 1 DOSE: 300 INJECTION INTRAVENOUS at 10:03

## 2022-08-25 ENCOUNTER — NURSE NAVIGATOR (OUTPATIENT)
Dept: ONCOLOGY | Facility: CLINIC | Age: 67
End: 2022-08-25

## 2022-08-25 DIAGNOSIS — R59.0 HILAR ADENOPATHY: Primary | ICD-10-CM

## 2022-08-25 NOTE — PROGRESS NOTES
DATE OF CONSULTATION:  08/26/2022     REASON FOR REFERRAL: Lung Cancer    REFERRING PHYSICIAN: Dr. Murrieta    CHIEF COMPLAINT:  Newly diagnosed lung cancer      HISTORY OF PRESENT ILLNESS:   Serenity Villegas is a very pleasant 67 y.o. female who is being seen today in the presence of her son at the request of Dr. Moy Murrieta for evaluation and treatment of newly diagnosed lung cancer.  Ms. Villegas is quite anxious today.  She says that s she initially began to feel poorly approximately 6 weeks ago.  She started to feel short of breath, initially at night, but then progressively worse during the day as well, and says she noticed a rattling sensation in her chest.  She denies any chest pain, but says that she noticed her activity began to be affected.  She would feel very short of breath with minimal exertion and started to have trouble picking up her 2-year-old grandson.  Her son took her to a new primary care physician who promptly ordered CT imaging which was abnormal.  This led to her seeing Dr. Murrieta, and she underwent bronchoscopy on 8/22/2022.  Biopsies and BAL from the R[facility] as well as 12 R and 7 lymph node stations all revealed small cell lung cancer.  PET/CT done 8/24/2022 revealed a central masslike process involving the right hilum centered around the right middle lobe mainstem bronchus which was approximately 4.3 cm in dimension (SUV 7.8) as well as a right suprahilar lymph node which was 2.5 cm in diameter with mass-effect on the right mainstem bronchus.  There was also an 11 mm nodule in the right lower lobe which did not show FDG hypermetabolism.  There was no evidence of distant metastatic disease.  She was referred here for further evaluation and treatment.    Ms. Villegas notes that she has lost approximately 4 pounds over the last several weeks.  Her appetite has been decent, but she does notice that she has mild nausea over the last several weeks.  She occasionally vomits, but not routinely.   "She denies significant chest pain, but does occasionally get twinges of pain.  She complains of shortness of breath especially with activity and cough productive of clear sputum.  She denies abdominal pain.  She denies diarrhea or constipation.  She chronically gets headaches (maybe once to twice a month).  This is typical for her and she has a history of what sounds like classical migraine which has been going on for several years.  These headaches are not changed.  She denies neurologic symptoms.    PAST MEDICAL HISTORY:  Past Medical History:   Diagnosis Date   • Arthritis    • Coronary artery disease    • Heart disease    • Hyperlipidemia    • Hypertension    • Right lower lobe lung mass        PAST SURGICAL HISTORY:  Past Surgical History:   Procedure Laterality Date   • BRONCHOSCOPY N/A 2022    Procedure: BRONCHOSCOPY WITH ENDOBRONCHIAL ULTRASOUND;  Surgeon: Amari Murrieta MD;  Location: [facility];  Service: Pulmonary;  Laterality: N/A;   • CARDIAC SURGERY      3 2002   [place]   • HYSTERECTOMY      arh   • PACEMAKER IMPLANTATION      [facility]   Hysterectomy performed for endometriosis (benign) \"a long time ago\"    FAMILY HISTORY:  Family History   Problem Relation Age of Onset   • Cancer Mother    • Heart attack Father    Mother  with melanoma  Maternal grandmother  of unknown type malignancy    SOCIAL HISTORY:  Social History     Socioeconomic History   • Marital status:    Tobacco Use   • Smoking status: Current Every Day Smoker     Packs/day: 0.50     Years: 20.00     Pack years: 10.00   • Smokeless tobacco: Never Used   • Tobacco comment: 1  pack per day history   Vaping Use   • Vaping Use: Never used   Substance and Sexual Activity   • Alcohol use: Never   • Drug use: Never   • Sexual activity: Defer   Ms. Villegas lives alone.  Her son accompanies her to her visit today and is very supportive.  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  No unusual " "chemical exposures.  She is a smoker and reports she currently smokes about half pack per day      REVIEW OF SYSTEMS:   A comprehensive 14 point review of systems was performed.  Significant findings as mentioned above.  All other systems reviewed and are negative.      MEDICATIONS:  The current medication list was reviewed in the EMR    Current Outpatient Medications:   •  albuterol sulfate  (90 Base) MCG/ACT inhaler, , Disp: , Rfl:   •  Anoro Ellipta 62.5-25 MCG/INH aerosol powder  inhaler, , Disp: , Rfl:   •  atenolol (TENORMIN) 50 MG tablet, , Disp: , Rfl:   •  atorvastatin (LIPITOR) 40 MG tablet, , Disp: , Rfl:   •  benazepril (LOTENSIN) 20 MG tablet, , Disp: , Rfl:   •  Diclofenac Sodium (VOLTAREN) 1 % gel gel, , Disp: , Rfl:   •  levocetirizine (XYZAL) 5 MG tablet, , Disp: , Rfl:   •  montelukast (SINGULAIR) 10 MG tablet, , Disp: , Rfl:     ALLERGIES:  No Known Allergies    PHYSICAL EXAM:  Vitals:    08/26/22 1341   BP: 121/79   Pulse: 89   Resp: 18   Temp: 97.5 °F (36.4 °C)   TempSrc: Temporal   SpO2: 96%   Weight: 56 kg (123 lb 6.4 oz)   Height: 160 cm (63\")   PainSc: 0-No pain       ECOG score: 0     PHQ-9 Total Score: 0       General:  Awake, alert and oriented.  Appears anxious, but in no distress  HEENT:  Pupils are equal, round and reactive to light and accommodation, Extra-ocular movements full, Oropharyx clear, mucous membranes moist  Neck:  No JVD, thyromegaly or lymphadenopathy  CV:  Regular rate and rhythm, no murmurs, rubs or gallops  Resp:  Lungs are clear to auscultation bilaterally with the exception of a few scattered bilateral wheezes  Abd:  Soft, non-tender, non-distended, bowel sounds present, no organomegaly or masses  Ext:  No clubbing, cyanosis or edema  Lymph:  No cervical, supraclavicular, axillary, inguinal or femoral adenopathy  Neuro:  MS as above, CN II-XII intact, strength 5/5 IN BUE and BLE.  Light touch diffusely intact.  FTN intact bilaterally.  No pronator " drift.      PATHOLOGY:    08/22/2022            ENDOSCOPY:    Bronchoscopy: Dr. Murrieta 8/22/2022  Operation: Flexible fiberoptic bronchoscopy     Findings: Tumor emanating from the right middle lobe     Specimen(s): Endobronchial biopsies right middle lobe.  BAL right middle lobe.  Cytology brush right middle lobe.  Transbronchial needle aspiration station 12 R and station 7     Estimated Blood Loss: Minimal/None     Complications: No immediate      IMAGING:    CT Chest with Contrast 08/10/22      NM PET/CT Skull Base to Mid Thigh (08/24/2022 11:30)  FINDINGS:      HEAD:    Brain:  Unremarkable as visualized.    Nasopharynx:  Unremarkable.      NECK:    Hypopharynx:  Unremarkable.    Larynx:  Unremarkable.    Trachea:  Unremarkable.    Retropharyngeal space:  Unremarkable.    Submandibular/parotid glands:  Unremarkable.  Glands are normal in  size.    Thyroid:  Unremarkable.  No enlarged or calcified nodules.      CHEST:    Lungs:  Central masslike process involving the right hilum centered  around the right middle lobe main stem bronchus that may represent a  sharath conglomeration or a primary mass and is 4.3 cm. FDG  hypermetabolism is noted with maximum SUV: 7.8.  11 mm nodule right  lower lobe shows no FDG hypermetabolism.  Emphysema is noted.    Pleural space:  Unremarkable.  No significant effusion.  No  pneumothorax.    Heart:  Cardiomegaly.  CABG.  No significant pericardial effusion.    Mediastinum:  Unremarkable.  No mass.      ABDOMEN:    Liver:  Unremarkable.    Gallbladder and bile ducts:  Unremarkable.  No calcified stones.  No  ductal dilation.    Pancreas:  Unremarkable.  No ductal dilation.    Spleen:  Unremarkable.  No splenomegaly.    Adrenals:  Unremarkable.  No mass.    Kidneys and ureters:  Atrophy right kidney.  Compensatory hypertrophy  left kidney.  Nonobstructing left kidney stones.    Stomach and bowel:  Sigmoid diverticulosis without evidence of acute  diverticulitis.  No obstruction.       PELVIS:    Appendix:  No findings to suggest acute appendicitis.    Bladder:  Unremarkable.    Reproductive:  Unremarkable as visualized.      WHOLE BODY:    Intraperitoneal space:  Unremarkable.  No significant fluid  collection.  No free air.    Bones/joints:  Unremarkable.  No acute fracture.  No dislocation.    Soft tissues:  Unremarkable.    Vasculature:  Advanced atherosclerosis of the aortoiliac vasculature.   No aortic aneurysm.    Lymph nodes:  Enlarged right suprahilar lymph node or primary mass is  noted that is approximately 2.5 cm and produces mass effect on the right  mainstem bronchus. FDG hypermetabolism is noted. Maximum SUV: 8.8.     IMPRESSION:  1.  Central masslike process involving the right hilum centered around  the right middle lobe main stem bronchus that may represent a sharath  conglomeration or a primary mass and is 4.3 cm. FDG hypermetabolism is  noted with maximum SUV: 7.8.  2.  Enlarged right suprahilar lymph node or primary mass is noted that  is approximately 2.5 cm and produces mass effect on the right mainstem  bronchus. FDG hypermetabolism is noted. Maximum SUV: 8.8.  3.  11 mm nodule right lower lobe shows no FDG hypermetabolism.  4. Other incidental and nonacute findings as above.          RECENT LABS:  Lab Results   Component Value Date    WBC 7.68 08/19/2022    HGB 13.1 08/19/2022    HCT 39.5 08/19/2022    MCV 92.9 08/19/2022    RDW 12.7 08/19/2022     08/19/2022       Lab Results   Component Value Date     08/19/2022    K 5.0 08/19/2022    CO2 21.3 (L) 08/19/2022     08/19/2022    BUN 10 08/19/2022    CREATININE 0.84 08/19/2022    GLUCOSE 100 (H) 08/19/2022    CALCIUM 9.8 08/19/2022           ASSESSMENT & PLAN:  Serenity Villegas is a very pleasant 67 y.o. female with newly diagnosed limited stage small cell lung cancer.    1.  Limited stage small cell lung cancer:  - Appears to have limited stage disease.  CNS imaging has not yet been completed.  MRI of the  brain was ordered, but she was unable to have this completed due to the presence of a pacemaker.  We will order CT of the head with contrast.  - We will obtain baseline blood work today.  - I have recommended concurrent chemotherapy and radiation with cisplatin and etoposide.  We discussed potential risks benefits and side effects today, and she would like to proceed.  - We will begin allopurinol 300 mg p.o. daily  - We will refer for Port-A-Cath placement.  - We will refer for radiation oncology consult.    2.  Prophylaxis:  - She did not have 2021 influenza vaccine.  I recommended she get this years vaccine.  - We will give Prevnar 20 vaccine today.  - She has not had COVID vaccinations.  I recommended that she get this completed as soon as possible.  Could also consider Evusheld treatment.    3.  ACO / BALJIT/Other  Quality measures  -  Serenity Villegas did not receive 2021 flu vaccine.  Recommended she get issues vaccine.  -  Serenity Villegas reports a pain score of 0.  Given her pain assessment as noted, treatment options were discussed and the following options were decided upon as a follow-up plan to address the patient's pain: No intervention needed at this time..  -  Current outpatient and discharge medications have been reconciled for the patient.  Reviewed by: Rhianna Wood MD    4.  Follow-up:  - Baseline lab work today.  - CT of the head with contrast  - Refer to surgery for Port-A-Cath placement.  - Refer to Dr. Spain for concurrent chemotherapy and radiation.  Given that it will take some time to plan radiation, we will plan to give first cycle of chemotherapy alone and start radiation with second cycle of therapy.  - Prevnar 20 vaccination today.  - Recommended she get COVID vaccinations.  - We will work on chemotherapy approvals and schedule chemotherapy teaching visit.      I spent 60 minutes with Serenity Villegas today.  In the office today, more than 50% of this time was spent face-to-face with her   in counseling / coordination of care, reviewing her medical history and counseling on the current treatment plan.  All questions were answered to her satisfaction      Electronically Signed by: Rhianna Wood MD      CC:     DO Amari Russell MD Weisi Yan, MD

## 2022-08-25 NOTE — PROGRESS NOTES
Call received from patient's son, Dung, to Nurse Navigator.  Son had general questions regarding patient's new diagnosis of Small Cell Lung Cancer and about patient's PET scan results.  Answered son's general questions; encouraged that Dr. Wood in patient's initial consult appointment scheduled for tomorrow, 8/26/22 will be able to discuss disease process, PET results, and treatment plan in more detail.  Son stated that he would be present with patient at tomorrow's appointment.  Son verbalized understanding and is in agreement with plan of care.

## 2022-08-25 NOTE — PROGRESS NOTES
Nurse Navigator called to patient on this date.  Pt was newly diagnosed with SCLC earlier today.  Encouragement and support given to patient, as to what to anticipate with med/onc consult appointment with Dr. Wood tomorrow.  Questions encouraged and answered.  Empathetic listening provided and reassurance given.  Informed patient that NN will try to be present at tomorrow's appointment, to help listen and take notes.  Pt verbalized understanding and is in agreement with plan of care.

## 2022-08-25 NOTE — PROGRESS NOTES
The patient underwent a bronchoscopy on Monday.  Biopsy c/w small cell lung cancer.  PET also reviewed.    I discussed these results with the patient on the phone.    I have ordered an MRI brain and Med Onc appointment.

## 2022-08-26 ENCOUNTER — CONSULT (OUTPATIENT)
Dept: ONCOLOGY | Facility: CLINIC | Age: 67
End: 2022-08-26

## 2022-08-26 ENCOUNTER — NURSE NAVIGATOR (OUTPATIENT)
Dept: ONCOLOGY | Facility: CLINIC | Age: 67
End: 2022-08-26

## 2022-08-26 VITALS
HEART RATE: 89 BPM | RESPIRATION RATE: 18 BRPM | TEMPERATURE: 97.5 F | BODY MASS INDEX: 21.86 KG/M2 | HEIGHT: 63 IN | SYSTOLIC BLOOD PRESSURE: 121 MMHG | DIASTOLIC BLOOD PRESSURE: 79 MMHG | OXYGEN SATURATION: 96 % | WEIGHT: 123.4 LBS

## 2022-08-26 DIAGNOSIS — C34.90 SMALL CELL LUNG CANCER: Primary | ICD-10-CM

## 2022-08-26 DIAGNOSIS — Z23 ENCOUNTER FOR IMMUNIZATION: ICD-10-CM

## 2022-08-26 DIAGNOSIS — J44.9 CHRONIC OBSTRUCTIVE PULMONARY DISEASE, UNSPECIFIED COPD TYPE: ICD-10-CM

## 2022-08-26 DIAGNOSIS — Z79.899 LONG-TERM USE OF HIGH-RISK MEDICATION: ICD-10-CM

## 2022-08-26 LAB
ALBUMIN SERPL-MCNC: 4.51 G/DL (ref 3.5–5.2)
ALBUMIN/GLOB SERPL: 1.8 G/DL
ALP SERPL-CCNC: 65 U/L (ref 39–117)
ALT SERPL W P-5'-P-CCNC: 6 U/L (ref 1–33)
ANION GAP SERPL CALCULATED.3IONS-SCNC: 14.3 MMOL/L (ref 5–15)
AST SERPL-CCNC: 17 U/L (ref 1–32)
BASOPHILS # BLD AUTO: 0.02 10*3/MM3 (ref 0–0.2)
BASOPHILS NFR BLD AUTO: 0.3 % (ref 0–1.5)
BILIRUB SERPL-MCNC: 0.4 MG/DL (ref 0–1.2)
BUN SERPL-MCNC: 15 MG/DL (ref 8–23)
BUN/CREAT SERPL: 16.5 (ref 7–25)
CALCIUM SPEC-SCNC: 10.1 MG/DL (ref 8.6–10.5)
CHLORIDE SERPL-SCNC: 101 MMOL/L (ref 98–107)
CO2 SERPL-SCNC: 21.7 MMOL/L (ref 22–29)
CREAT SERPL-MCNC: 0.91 MG/DL (ref 0.57–1)
DEPRECATED RDW RBC AUTO: 43.5 FL (ref 37–54)
EGFRCR SERPLBLD CKD-EPI 2021: 70.2 ML/MIN/1.73
EOSINOPHIL # BLD AUTO: 0.08 10*3/MM3 (ref 0–0.4)
EOSINOPHIL NFR BLD AUTO: 1.1 % (ref 0.3–6.2)
ERYTHROCYTE [DISTWIDTH] IN BLOOD BY AUTOMATED COUNT: 12.7 % (ref 12.3–15.4)
FERRITIN SERPL-MCNC: 126.8 NG/ML (ref 13–150)
GLOBULIN UR ELPH-MCNC: 2.5 GM/DL
GLUCOSE SERPL-MCNC: 103 MG/DL (ref 65–99)
HCT VFR BLD AUTO: 38.6 % (ref 34–46.6)
HGB BLD-MCNC: 12.8 G/DL (ref 12–15.9)
IMM GRANULOCYTES # BLD AUTO: 0.02 10*3/MM3 (ref 0–0.05)
IMM GRANULOCYTES NFR BLD AUTO: 0.3 % (ref 0–0.5)
IRON 24H UR-MRATE: 35 MCG/DL (ref 37–145)
IRON SATN MFR SERPL: 10 % (ref 20–50)
LDH SERPL-CCNC: 249 U/L (ref 135–214)
LYMPHOCYTES # BLD AUTO: 1.74 10*3/MM3 (ref 0.7–3.1)
LYMPHOCYTES NFR BLD AUTO: 23.9 % (ref 19.6–45.3)
MCH RBC QN AUTO: 30.8 PG (ref 26.6–33)
MCHC RBC AUTO-ENTMCNC: 33.2 G/DL (ref 31.5–35.7)
MCV RBC AUTO: 93 FL (ref 79–97)
MONOCYTES # BLD AUTO: 0.78 10*3/MM3 (ref 0.1–0.9)
MONOCYTES NFR BLD AUTO: 10.7 % (ref 5–12)
NEUTROPHILS NFR BLD AUTO: 4.63 10*3/MM3 (ref 1.7–7)
NEUTROPHILS NFR BLD AUTO: 63.7 % (ref 42.7–76)
NRBC BLD AUTO-RTO: 0 /100 WBC (ref 0–0.2)
PLATELET # BLD AUTO: 253 10*3/MM3 (ref 140–450)
PMV BLD AUTO: 9.2 FL (ref 6–12)
POTASSIUM SERPL-SCNC: 5 MMOL/L (ref 3.5–5.2)
PROT SERPL-MCNC: 7 G/DL (ref 6–8.5)
RBC # BLD AUTO: 4.15 10*6/MM3 (ref 3.77–5.28)
SODIUM SERPL-SCNC: 137 MMOL/L (ref 136–145)
TIBC SERPL-MCNC: 337 MCG/DL (ref 298–536)
TRANSFERRIN SERPL-MCNC: 226 MG/DL (ref 200–360)
TSH SERPL DL<=0.05 MIU/L-ACNC: 0.52 UIU/ML (ref 0.27–4.2)
URATE SERPL-MCNC: 5 MG/DL (ref 2.4–5.7)
WBC NRBC COR # BLD: 7.27 10*3/MM3 (ref 3.4–10.8)

## 2022-08-26 PROCEDURE — 82607 VITAMIN B-12: CPT | Performed by: INTERNAL MEDICINE

## 2022-08-26 PROCEDURE — 82746 ASSAY OF FOLIC ACID SERUM: CPT | Performed by: INTERNAL MEDICINE

## 2022-08-26 PROCEDURE — 84466 ASSAY OF TRANSFERRIN: CPT | Performed by: INTERNAL MEDICINE

## 2022-08-26 PROCEDURE — 36415 COLL VENOUS BLD VENIPUNCTURE: CPT | Performed by: INTERNAL MEDICINE

## 2022-08-26 PROCEDURE — 80053 COMPREHEN METABOLIC PANEL: CPT | Performed by: INTERNAL MEDICINE

## 2022-08-26 PROCEDURE — 84550 ASSAY OF BLOOD/URIC ACID: CPT | Performed by: INTERNAL MEDICINE

## 2022-08-26 PROCEDURE — 84443 ASSAY THYROID STIM HORMONE: CPT | Performed by: INTERNAL MEDICINE

## 2022-08-26 PROCEDURE — 82728 ASSAY OF FERRITIN: CPT | Performed by: INTERNAL MEDICINE

## 2022-08-26 PROCEDURE — 83540 ASSAY OF IRON: CPT | Performed by: INTERNAL MEDICINE

## 2022-08-26 PROCEDURE — 85025 COMPLETE CBC W/AUTO DIFF WBC: CPT | Performed by: INTERNAL MEDICINE

## 2022-08-26 PROCEDURE — 83615 LACTATE (LD) (LDH) ENZYME: CPT | Performed by: INTERNAL MEDICINE

## 2022-08-26 PROCEDURE — 90677 PCV20 VACCINE IM: CPT | Performed by: INTERNAL MEDICINE

## 2022-08-26 PROCEDURE — 99205 OFFICE O/P NEW HI 60 MIN: CPT | Performed by: INTERNAL MEDICINE

## 2022-08-26 PROCEDURE — G0009 ADMIN PNEUMOCOCCAL VACCINE: HCPCS | Performed by: INTERNAL MEDICINE

## 2022-08-26 RX ORDER — SODIUM CHLORIDE 9 MG/ML
250 INJECTION, SOLUTION INTRAVENOUS ONCE
Status: CANCELLED | OUTPATIENT
Start: 2022-08-29

## 2022-08-26 RX ORDER — PALONOSETRON 0.05 MG/ML
0.25 INJECTION, SOLUTION INTRAVENOUS ONCE
Status: CANCELLED | OUTPATIENT
Start: 2022-08-29

## 2022-08-26 RX ORDER — OLANZAPINE 5 MG/1
5 TABLET ORAL ONCE
Status: CANCELLED | OUTPATIENT
Start: 2022-08-29 | End: 2022-08-29

## 2022-08-26 RX ORDER — SODIUM CHLORIDE 9 MG/ML
250 INJECTION, SOLUTION INTRAVENOUS ONCE
Status: CANCELLED | OUTPATIENT
Start: 2022-09-08

## 2022-08-26 RX ORDER — SODIUM CHLORIDE 9 MG/ML
250 INJECTION, SOLUTION INTRAVENOUS ONCE
Status: CANCELLED | OUTPATIENT
Start: 2022-09-09

## 2022-08-26 RX ORDER — ALLOPURINOL 300 MG/1
300 TABLET ORAL DAILY
Qty: 30 TABLET | Refills: 3 | Status: SHIPPED | OUTPATIENT
Start: 2022-08-26 | End: 2022-11-14

## 2022-08-26 NOTE — PROGRESS NOTES
Nurse Navigator followed up with patient and her son during new patient consultation with Dr. Wood in medical oncology for her newly diagnosed Small Cell Lung Cancer.  NN stayed present throughout consultation, taking notes on information discussed by Dr. Wood.  Copy of notes given to patient at end of visit.  Reviewed next steps with patient and son in discussing upcoming consult appointments for general surgery, radiation oncology, and for chemotherapy education.  Questions encouraged.  Empathetic listening provided and reassurance given.  Pt stated that she still has NN contact information previously provided.  Encouraged for pt/son to call NN with any questions or concerns.  Pt and son verbalized understanding and are in agreement with plan of care.

## 2022-08-27 LAB
FOLATE SERPL-MCNC: 14.2 NG/ML (ref 4.78–24.2)
VIT B12 BLD-MCNC: 211 PG/ML (ref 211–946)

## 2022-08-28 RX ORDER — CYANOCOBALAMIN (VITAMIN B-12) 1000 MCG
500 TABLET, SUBLINGUAL SUBLINGUAL DAILY
Qty: 30 TABLET | Refills: 5 | Status: SHIPPED | OUTPATIENT
Start: 2022-08-28

## 2022-08-30 ENCOUNTER — OFFICE VISIT (OUTPATIENT)
Dept: RADIATION ONCOLOGY | Facility: HOSPITAL | Age: 67
End: 2022-08-30

## 2022-08-31 ENCOUNTER — DOCUMENTATION (OUTPATIENT)
Dept: ONCOLOGY | Facility: HOSPITAL | Age: 67
End: 2022-08-31

## 2022-08-31 ENCOUNTER — TELEPHONE (OUTPATIENT)
Dept: SURGERY | Facility: CLINIC | Age: 67
End: 2022-08-31

## 2022-08-31 ENCOUNTER — CONSULT (OUTPATIENT)
Dept: RADIATION ONCOLOGY | Facility: HOSPITAL | Age: 67
End: 2022-08-31

## 2022-08-31 ENCOUNTER — APPOINTMENT (OUTPATIENT)
Dept: RADIATION ONCOLOGY | Facility: HOSPITAL | Age: 67
End: 2022-08-31

## 2022-08-31 VITALS
RESPIRATION RATE: 18 BRPM | SYSTOLIC BLOOD PRESSURE: 128 MMHG | DIASTOLIC BLOOD PRESSURE: 86 MMHG | WEIGHT: 126.6 LBS | TEMPERATURE: 97.9 F | HEART RATE: 89 BPM | OXYGEN SATURATION: 96 % | BODY MASS INDEX: 22.43 KG/M2

## 2022-08-31 DIAGNOSIS — C34.90 SMALL CELL LUNG CANCER: Primary | ICD-10-CM

## 2022-08-31 PROCEDURE — 99205 OFFICE O/P NEW HI 60 MIN: CPT

## 2022-08-31 PROCEDURE — 77470 SPECIAL RADIATION TREATMENT: CPT | Performed by: RADIOLOGY

## 2022-08-31 PROCEDURE — 77263 THER RADIOLOGY TX PLNG CPLX: CPT | Performed by: RADIOLOGY

## 2022-08-31 PROCEDURE — G0463 HOSPITAL OUTPT CLINIC VISIT: HCPCS | Performed by: RADIOLOGY

## 2022-08-31 RX ORDER — ESOMEPRAZOLE MAGNESIUM 40 MG/1
40 CAPSULE, DELAYED RELEASE ORAL
Qty: 60 CAPSULE | Refills: 1 | Status: SHIPPED | OUTPATIENT
Start: 2022-08-31 | End: 2022-10-28 | Stop reason: SDUPTHER

## 2022-08-31 NOTE — PROGRESS NOTES
New Patient Office Consult      Patient Name: Serenity Villegas  : 1955   MRN: [id number]     Requesting Physician: Rhianna Wood MD    Chief Complaint: Lung cancer  Pathology: Small cell carcinoma  Staging:  III T2N2M0    History of Present Illness: Serenity Villegas is a pleasant 67 y.o. female who is here today for consultation regarding radiation therapy.     Around 2 months ago the patient started to feel short of breath that progressively worsened, accompanied by rattling sensation in her chest.  She was having shortness of breath with only minimal exertion.  She was seen by her PCP who ordered CT imaging.  This imaging was abnormal and she was referred to Dr. Murrieta.  On 2022 Dr. Murrieta performed a bronchoscopy and pathology revealed small cell carcinoma.  On 2022 a PET CT was performed and revealed a masslike process in the right hilum centered around the right middle lobe mainstem bronchus which was approximately 4.3 cm in dimension, a right suprahilar lymph node which was 2.5 cm in diameter with masslike effect on the right mainstem bronchus, and a 11 mm nodule in the right lower lobe which did not show hypermetabolism.  She continues to have shortness of breath with mild exertion.     Subjective      Review of Systems:   Review of Systems   HENT: Negative for sore throat and trouble swallowing.    Respiratory: Positive for shortness of breath (with exertion). Negative for chest tightness.    Cardiovascular: Negative for chest pain.   Skin: Negative for color change.   All other systems reviewed and are negative.      I have reviewed and confirmed the accuracy of the ROS as documented by the MA/LPN/RN DINORAH Mcmillan     Past Oncology History:   Oncology/Hematology History   Small cell lung cancer (HCC)   2022 Initial Diagnosis    Small cell lung cancer (HCC)     2022 -  Chemotherapy    OP LUNG CISplatin 75 mg/m2 / Etoposide 100mg/m2 + RT          Past  Radiation History:  No previous exposures to ionizing radiation therapy and there are not relative contraindications including connective tissue disorder.     Past Medical History:   Past Medical History:   Diagnosis Date   • Arthritis    • Coronary artery disease    • Heart disease    • Hyperlipidemia    • Hypertension    • Right lower lobe lung mass        Past Surgical History:   Past Surgical History:   Procedure Laterality Date   • BRONCHOSCOPY N/A 8/22/2022    Procedure: BRONCHOSCOPY WITH ENDOBRONCHIAL ULTRASOUND;  Surgeon: Amari Murrieta MD;  Location: [facility];  Service: Pulmonary;  Laterality: N/A;   • CARDIAC SURGERY      3 VESELS  2002   [place]   • HYSTERECTOMY      arh   • PACEMAKER IMPLANTATION      [facility]       Family History:   Family History   Problem Relation Age of Onset   • Cancer Mother    • Heart attack Father        Social History:   Social History     Socioeconomic History   • Marital status:    Tobacco Use   • Smoking status: Current Every Day Smoker     Packs/day: 0.50     Years: 20.00     Pack years: 10.00   • Smokeless tobacco: Never Used   • Tobacco comment: 1  pack per day history   Vaping Use   • Vaping Use: Never used   Substance and Sexual Activity   • Alcohol use: Never   • Drug use: Never   • Sexual activity: Defer       Medications:     Current Outpatient Medications:   •  albuterol sulfate  (90 Base) MCG/ACT inhaler, , Disp: , Rfl:   •  allopurinol (ZYLOPRIM) 300 MG tablet, Take 1 tablet by mouth Daily., Disp: 30 tablet, Rfl: 3  •  Anoro Ellipta 62.5-25 MCG/INH aerosol powder  inhaler, , Disp: , Rfl:   •  atenolol (TENORMIN) 50 MG tablet, , Disp: , Rfl:   •  atorvastatin (LIPITOR) 40 MG tablet, , Disp: , Rfl:   •  benazepril (LOTENSIN) 20 MG tablet, , Disp: , Rfl:   •  Cyanocobalamin (Vitamin B-12) 500 MCG sublingual tablet, Place 500 mcg under the tongue Daily., Disp: 30 tablet, Rfl: 5  •  Diclofenac Sodium (VOLTAREN) 1 % gel gel, , Disp: ,  Rfl:   •  levocetirizine (XYZAL) 5 MG tablet, , Disp: , Rfl:   •  montelukast (SINGULAIR) 10 MG tablet, , Disp: , Rfl:   •  esomeprazole (nexIUM) 40 MG capsule, Take 1 capsule by mouth Every Morning Before Breakfast., Disp: 60 capsule, Rfl: 1    Allergies:   No Known Allergies    PHQ-9 Depression Screening  Little interest or pleasure in doing things?     Feeling down, depressed, or hopeless?     Trouble falling or staying asleep, or sleeping too much?     Feeling tired or having little energy?     Poor appetite or overeating?     Feeling bad about yourself - or that you are a failure or have let yourself or your family down?     Trouble concentrating on things, such as reading the newspaper or watching television?     Moving or speaking so slowly that other people could have noticed? Or the opposite - being so fidgety or restless that you have been moving around a lot more than usual?     Thoughts that you would be better off dead, or of hurting yourself in some way?     PHQ-9 Total Score     If you checked off any problems, how difficult have these problems made it for you to do your work, take care of things at home, or get along with other people?        Distress Screenin  KPS: 90%     Results Review:   The following data was reviewed by: DINORAH Mcmillan on 2022:  Common labs    Common Labsle 22    1348 1348 1535 1535 1535   Glucose  100 (A)   103 (A)   BUN  10   15   Creatinine  0.84   0.91   Sodium  137   137   Potassium  5.0   5.0   Chloride  104   101   Calcium  9.8   10.1   Albumin     4.51   Total Bilirubin     0.4   Alkaline Phosphatase     65   AST (SGOT)     17   ALT (SGPT)     6   WBC 7.68  7.27     Hemoglobin 13.1  12.8     Hematocrit 39.5  38.6     Platelets 223  253     Uric Acid    5.0    (A) Abnormal value            Imaging:  NM PET/CT Skull Base to Mid Thigh    Result Date: 2022  1.  Central masslike process involving the right hilum  centered around the right middle lobe main stem bronchus that may represent a sharath conglomeration or a primary mass and is 4.3 cm. FDG hypermetabolism is noted with maximum SUV: 7.8. 2.  Enlarged right suprahilar lymph node or primary mass is noted that is approximately 2.5 cm and produces mass effect on the right mainstem bronchus. FDG hypermetabolism is noted. Maximum SUV: 8.8. 3.  11 mm nodule right lower lobe shows no FDG hypermetabolism. 4. Other incidental and nonacute findings as above.  This report was finalized on 8/24/2022 12:55 PM by Dr. Matt Ayon MD.       CT chest with contrast-8/10/2022       Pathology:  8/22/22           Objective     Physical Exam:  Physical Exam  Vitals reviewed.   Constitutional:       Appearance: Normal appearance.   Cardiovascular:      Rate and Rhythm: Normal rate and regular rhythm.      Pulses: Normal pulses.      Heart sounds: Normal heart sounds.      Comments: Pacemaker in place, left CW  Pulmonary:      Effort: Pulmonary effort is normal.      Breath sounds: Normal breath sounds.   Skin:     General: Skin is warm and dry.   Neurological:      General: No focal deficit present.      Mental Status: She is alert and oriented to person, place, and time. Mental status is at baseline.   Psychiatric:         Mood and Affect: Mood normal.         Behavior: Behavior normal.         Thought Content: Thought content normal.         Vital Signs:   Vitals:    08/31/22 0921   BP: 128/86   Pulse: 89   Resp: 18   Temp: 97.9 °F (36.6 °C)   TempSrc: Temporal   SpO2: 96%   Weight: 57.4 kg (126 lb 9.6 oz)   PainSc: 0-No pain     Body mass index is 22.43 kg/m².     Assessment / Plan      Assessment/Plan:   Ms. Villegas is a 67 year old F with limited stage III T2N2M0 SCLC of right lung. PET/CT done 8/24/2022 revealed a central masslike process involving the right hilum centered around the right middle lobe mainstem bronchus which was approximately 4.3 cm in dimension (SUV 7.8) as well as  a right suprahilar lymph node which was 2.5 cm in diameter with mass-effect on the right mainstem bronchus.  There was also an 11 mm nodule in the right lower lobe which did not show FDG hypermetabolism.  There was no evidence of distant metastatic disease.      MRI of the brain was ordered, but she was unable to have this completed due to the presence of a pacemaker.  CT of the head with contrast ordered, final stage pending.    Concurrent chemotherapy and radiation with cisplatin and etoposide is planned. Radiation will be would be IMRT/IGRT, 60Gy/2GyX30, daily treatment. She will be simulated with 4D-CT and IV contrast.    Side effects include, but are not limited to: dysphagia, SOB, radiation induced pneumonitis, skin irritation.    Pt has stent and the XRT field is very close to AV junction, warned patient about increased cardiac events, asked patient to update her cardiologist.    --F/U brain CT.  --Obtain Pacemaker info for physics     will start chemo first, we will perform XRT. If no brain mets and good/any response to CRT then consider PCI 25Gy/10 after thoracic radiation.    Appropriate education was provided to the patient and explained in detail.  Aquaphor samples given.    Follow Up:   CT SIM tomorrow     Scribed for Dr. Arsenio Spain MD by Bea Galan, DINORAH 8/31/2022  10:21 EDT

## 2022-08-31 NOTE — PROGRESS NOTES
Patient is 68 Y/O white female diagnosed with Small Cell Lung Cancer.    Patient is in clinic today for radiation consultation.    Patient completed NCCN Distress Screening. Patient scored a 0 out of 10 on distress screening.    SS spoke with patient.  Patient lives at home alone.    Patient doesn't utilize any home health.    Patient doesn't utilize any durable medical equipment.    Patient has two son's: Luis and Dung.  Dung lives close to patient and assist as needed with care.  Luis lives in [place].    Patient completed NCCN Distress Screening and scored a 0 out of 10.    Distress Screening Follow-up    Diagnosis: Small Cell Lung Cancer    Location of Distress Screening: Radiation Oncology    Distress Level: 0-No distress (8/31/2022  9:00 AM)    Physical Concerns:    Fatigue: No  Pain: No  Sleep: No  Substance abuse: No    Practical Problems:    : No  Housing: No  Transportation: No  Treatment decisions: No    Emotional Concerns:     Family Concerns:    Ability to have children: No    Spiritual Concerns:      Interventions: SS provided time talking with patient, empathetic listening provided, and reassurance given. SS offered supportive counseling services to patient and patient declines services.    SS contact information was provided and encouraged patient to call with any questions or concerns.    Patient verbalized understanding.    SS completed [employer] application.  Patient lives in [facility] at [address] [place] [address] traveling approximately 96 miles.      SS will follow and assist as needed.

## 2022-09-01 ENCOUNTER — APPOINTMENT (OUTPATIENT)
Dept: RADIATION ONCOLOGY | Facility: HOSPITAL | Age: 67
End: 2022-09-01

## 2022-09-01 ENCOUNTER — OFFICE VISIT (OUTPATIENT)
Dept: SURGERY | Facility: CLINIC | Age: 67
End: 2022-09-01

## 2022-09-01 ENCOUNTER — OFFICE VISIT (OUTPATIENT)
Dept: RADIATION ONCOLOGY | Facility: HOSPITAL | Age: 67
End: 2022-09-01

## 2022-09-01 VITALS
BODY MASS INDEX: 22.36 KG/M2 | WEIGHT: 126.2 LBS | HEIGHT: 63 IN | HEART RATE: 91 BPM | SYSTOLIC BLOOD PRESSURE: 106 MMHG | DIASTOLIC BLOOD PRESSURE: 72 MMHG

## 2022-09-01 DIAGNOSIS — C34.90 SMALL CELL LUNG CANCER: Primary | ICD-10-CM

## 2022-09-01 PROCEDURE — 77334 RADIATION TREATMENT AID(S): CPT | Performed by: RADIOLOGY

## 2022-09-01 PROCEDURE — 99203 OFFICE O/P NEW LOW 30 MIN: CPT | Performed by: SURGERY

## 2022-09-01 NOTE — PROGRESS NOTES
Subjective   Serenity Villegas is a 67 y.o. female here today for possible port placement.    History of Present Illness  Ms. Villegas was seen in the office today along with her son to discuss port placement for her small cell carcinoma of the right lung.  Biopsies were performed on 8/22/2022 by Dr. Murrieta.  Chemotherapy and radiation has been recommended and port placement has been requested.  No Known Allergies  Current Outpatient Medications   Medication Sig Dispense Refill   • albuterol sulfate  (90 Base) MCG/ACT inhaler      • allopurinol (ZYLOPRIM) 300 MG tablet Take 1 tablet by mouth Daily. 30 tablet 3   • Anoro Ellipta 62.5-25 MCG/INH aerosol powder  inhaler      • atenolol (TENORMIN) 50 MG tablet      • atorvastatin (LIPITOR) 40 MG tablet      • benazepril (LOTENSIN) 20 MG tablet      • Cyanocobalamin (Vitamin B-12) 500 MCG sublingual tablet Place 500 mcg under the tongue Daily. 30 tablet 5   • Diclofenac Sodium (VOLTAREN) 1 % gel gel      • esomeprazole (nexIUM) 40 MG capsule Take 1 capsule by mouth Every Morning Before Breakfast. 60 capsule 1   • levocetirizine (XYZAL) 5 MG tablet      • montelukast (SINGULAIR) 10 MG tablet        No current facility-administered medications for this visit.     Past Medical History:   Diagnosis Date   • Arthritis    • Coronary artery disease    • Heart disease    • Hyperlipidemia    • Hypertension    • Right lower lobe lung mass      Past Surgical History:   Procedure Laterality Date   • BRONCHOSCOPY N/A 8/22/2022    Procedure: BRONCHOSCOPY WITH ENDOBRONCHIAL ULTRASOUND;  Surgeon: Amari Murrieta MD;  Location: [facility];  Service: Pulmonary;  Laterality: N/A;   • CARDIAC SURGERY      3 VESELS 2002   [facility]   • HYSTERECTOMY      arh   • PACEMAKER IMPLANTATION      [facility]       Pertinent Review of Systems:  Respiratory: no shortness of breath  Cardiovascular: no chest pain  Other pertinent:      Objective   /72 (BP Location: Left arm)    "Pulse 91   Ht 160 cm (63\")   Wt 57.2 kg (126 lb 3.2 oz)   BMI 22.36 kg/m²   Physical Exam  General:  This is a WD WN thin female in no acute distress  Lungs:  Respiratory effort normal. Auscultation: Clear, without wheezes, rhonchi, rales  Heart:  Regular rate and rhythm, without murmur, gallop, rub.  No pedal edema  Chest wall: Left infraclavicular pacemaker    Procedures     Results/Data:  Imaging: Records from medical oncology from 8/26/2022 were reviewed  Other: Pathology result from 8/22/2022 was reviewed    Assessment & Plan   Small cell carcinoma of the right lung    Proceed with port placement.  Although the patient is having radiation on the right due to the position of the pacemaker on the left, port placement on the left side would be extremely difficult.         Discussion/Summary: The risks of the surgical procedure were discussed.  Options of alternative treatments including no treatment (if applicable) were discussed.  Patient voiced understanding of the above issues and wishes to proceed    Time spent:     BMI is within normal parameters. No other follow-up for BMI required.       Future Appointments   Date Time Provider Department Center   9/9/2022  7:00 AM COR CT 1 BH COR CT COR   9/20/2022  8:00 AM COR MRI 1  COR MRI COR         Please note that portions of this note were completed with a voice recognition program.  "

## 2022-09-02 ENCOUNTER — TELEPHONE (OUTPATIENT)
Dept: SURGERY | Facility: CLINIC | Age: 67
End: 2022-09-02

## 2022-09-02 NOTE — H&P (VIEW-ONLY)
Subjective   Serenity Villegas is a 67 y.o. female here today for possible port placement.    History of Present Illness  Ms. Villegas was seen in the office today along with her son to discuss port placement for her small cell carcinoma of the right lung.  Biopsies were performed on 8/22/2022 by Dr. Murrieta.  Chemotherapy and radiation has been recommended and port placement has been requested.  No Known Allergies  Current Outpatient Medications   Medication Sig Dispense Refill   • albuterol sulfate  (90 Base) MCG/ACT inhaler      • allopurinol (ZYLOPRIM) 300 MG tablet Take 1 tablet by mouth Daily. 30 tablet 3   • Anoro Ellipta 62.5-25 MCG/INH aerosol powder  inhaler      • atenolol (TENORMIN) 50 MG tablet      • atorvastatin (LIPITOR) 40 MG tablet      • benazepril (LOTENSIN) 20 MG tablet      • Cyanocobalamin (Vitamin B-12) 500 MCG sublingual tablet Place 500 mcg under the tongue Daily. 30 tablet 5   • Diclofenac Sodium (VOLTAREN) 1 % gel gel      • esomeprazole (nexIUM) 40 MG capsule Take 1 capsule by mouth Every Morning Before Breakfast. 60 capsule 1   • levocetirizine (XYZAL) 5 MG tablet      • montelukast (SINGULAIR) 10 MG tablet        No current facility-administered medications for this visit.     Past Medical History:   Diagnosis Date   • Arthritis    • Coronary artery disease    • Heart disease    • Hyperlipidemia    • Hypertension    • Right lower lobe lung mass      Past Surgical History:   Procedure Laterality Date   • BRONCHOSCOPY N/A 8/22/2022    Procedure: BRONCHOSCOPY WITH ENDOBRONCHIAL ULTRASOUND;  Surgeon: Amari Murrieta MD;  Location: [facility];  Service: Pulmonary;  Laterality: N/A;   • CARDIAC SURGERY      3 VESELS 2002   [facility]   • HYSTERECTOMY      arh   • PACEMAKER IMPLANTATION      [facility]       Pertinent Review of Systems:  Respiratory: no shortness of breath  Cardiovascular: no chest pain  Other pertinent:      Objective   /72 (BP Location: Left arm)    "Pulse 91   Ht 160 cm (63\")   Wt 57.2 kg (126 lb 3.2 oz)   BMI 22.36 kg/m²   Physical Exam  General:  This is a WD WN thin female in no acute distress  Lungs:  Respiratory effort normal. Auscultation: Clear, without wheezes, rhonchi, rales  Heart:  Regular rate and rhythm, without murmur, gallop, rub.  No pedal edema  Chest wall: Left infraclavicular pacemaker    Procedures     Results/Data:  Imaging: Records from medical oncology from 8/26/2022 were reviewed  Other: Pathology result from 8/22/2022 was reviewed    Assessment & Plan   Small cell carcinoma of the right lung    Proceed with port placement.  Although the patient is having radiation on the right due to the position of the pacemaker on the left, port placement on the left side would be extremely difficult.         Discussion/Summary: The risks of the surgical procedure were discussed.  Options of alternative treatments including no treatment (if applicable) were discussed.  Patient voiced understanding of the above issues and wishes to proceed    Time spent:     BMI is within normal parameters. No other follow-up for BMI required.       Future Appointments   Date Time Provider Department Center   9/9/2022  7:00 AM COR CT 1 BH COR CT COR   9/20/2022  8:00 AM COR MRI 1  COR MRI COR         Please note that portions of this note were completed with a voice recognition program.    This document has been electronically signed by Ting CIFUENTES MD on September 2, 2022 16:53 EDT  "

## 2022-09-02 NOTE — H&P
Subjective   Serenity Villegas is a 67 y.o. female here today for possible port placement.    History of Present Illness  Ms. Villegas was seen in the office today along with her son to discuss port placement for her small cell carcinoma of the right lung.  Biopsies were performed on 8/22/2022 by Dr. Murrieta.  Chemotherapy and radiation has been recommended and port placement has been requested.  No Known Allergies  Current Outpatient Medications   Medication Sig Dispense Refill   • albuterol sulfate  (90 Base) MCG/ACT inhaler      • allopurinol (ZYLOPRIM) 300 MG tablet Take 1 tablet by mouth Daily. 30 tablet 3   • Anoro Ellipta 62.5-25 MCG/INH aerosol powder  inhaler      • atenolol (TENORMIN) 50 MG tablet      • atorvastatin (LIPITOR) 40 MG tablet      • benazepril (LOTENSIN) 20 MG tablet      • Cyanocobalamin (Vitamin B-12) 500 MCG sublingual tablet Place 500 mcg under the tongue Daily. 30 tablet 5   • Diclofenac Sodium (VOLTAREN) 1 % gel gel      • esomeprazole (nexIUM) 40 MG capsule Take 1 capsule by mouth Every Morning Before Breakfast. 60 capsule 1   • levocetirizine (XYZAL) 5 MG tablet      • montelukast (SINGULAIR) 10 MG tablet        No current facility-administered medications for this visit.     Past Medical History:   Diagnosis Date   • Arthritis    • Coronary artery disease    • Heart disease    • Hyperlipidemia    • Hypertension    • Right lower lobe lung mass      Past Surgical History:   Procedure Laterality Date   • BRONCHOSCOPY N/A 8/22/2022    Procedure: BRONCHOSCOPY WITH ENDOBRONCHIAL ULTRASOUND;  Surgeon: Amari Murrieta MD;  Location: [facility];  Service: Pulmonary;  Laterality: N/A;   • CARDIAC SURGERY      3 VESELS 2002   [facility]   • HYSTERECTOMY      arh   • PACEMAKER IMPLANTATION      [facility]       Pertinent Review of Systems:  Respiratory: no shortness of breath  Cardiovascular: no chest pain  Other pertinent:      Objective   /72 (BP Location: Left arm)    "Pulse 91   Ht 160 cm (63\")   Wt 57.2 kg (126 lb 3.2 oz)   BMI 22.36 kg/m²   Physical Exam  General:  This is a WD WN thin female in no acute distress  Lungs:  Respiratory effort normal. Auscultation: Clear, without wheezes, rhonchi, rales  Heart:  Regular rate and rhythm, without murmur, gallop, rub.  No pedal edema  Chest wall: Left infraclavicular pacemaker    Procedures     Results/Data:  Imaging: Records from medical oncology from 8/26/2022 were reviewed  Other: Pathology result from 8/22/2022 was reviewed    Assessment & Plan   Small cell carcinoma of the right lung    Proceed with port placement.  Although the patient is having radiation on the right due to the position of the pacemaker on the left, port placement on the left side would be extremely difficult.         Discussion/Summary: The risks of the surgical procedure were discussed.  Options of alternative treatments including no treatment (if applicable) were discussed.  Patient voiced understanding of the above issues and wishes to proceed    Time spent:     BMI is within normal parameters. No other follow-up for BMI required.       Future Appointments   Date Time Provider Department Center   9/9/2022  7:00 AM COR CT 1 BH COR CT COR   9/20/2022  8:00 AM COR MRI 1  COR MRI COR         Please note that portions of this note were completed with a voice recognition program.    This document has been electronically signed by Ting CIFUENTES MD on September 2, 2022 16:53 EDT  "

## 2022-09-06 ENCOUNTER — HOSPITAL ENCOUNTER (OUTPATIENT)
Facility: HOSPITAL | Age: 67
Setting detail: HOSPITAL OUTPATIENT SURGERY
Discharge: HOME | End: 2022-09-06
Attending: SURGERY | Admitting: SURGERY

## 2022-09-06 ENCOUNTER — ANESTHESIA (OUTPATIENT)
Dept: PERIOP | Facility: HOSPITAL | Age: 67
End: 2022-09-06

## 2022-09-06 ENCOUNTER — ANESTHESIA EVENT (OUTPATIENT)
Dept: PERIOP | Facility: HOSPITAL | Age: 67
End: 2022-09-06

## 2022-09-06 ENCOUNTER — APPOINTMENT (OUTPATIENT)
Dept: GENERAL RADIOLOGY | Facility: HOSPITAL | Age: 67
End: 2022-09-06

## 2022-09-06 VITALS
TEMPERATURE: 97.4 F | RESPIRATION RATE: 18 BRPM | WEIGHT: 124 LBS | BODY MASS INDEX: 21.97 KG/M2 | HEIGHT: 63 IN | OXYGEN SATURATION: 94 % | SYSTOLIC BLOOD PRESSURE: 155 MMHG | HEART RATE: 83 BPM | DIASTOLIC BLOOD PRESSURE: 73 MMHG

## 2022-09-06 DIAGNOSIS — C34.90 SMALL CELL LUNG CANCER: ICD-10-CM

## 2022-09-06 PROCEDURE — 71045 X-RAY EXAM CHEST 1 VIEW: CPT | Performed by: RADIOLOGY

## 2022-09-06 PROCEDURE — 25010000002 PROPOFOL 10 MG/ML EMULSION: Performed by: NURSE ANESTHETIST, CERTIFIED REGISTERED

## 2022-09-06 PROCEDURE — 25010000002 FENTANYL CITRATE (PF) 50 MCG/ML SOLUTION: Performed by: NURSE ANESTHETIST, CERTIFIED REGISTERED

## 2022-09-06 PROCEDURE — C1788 PORT, INDWELLING, IMP: HCPCS | Performed by: SURGERY

## 2022-09-06 PROCEDURE — 76000 FLUOROSCOPY <1 HR PHYS/QHP: CPT

## 2022-09-06 PROCEDURE — 71045 X-RAY EXAM CHEST 1 VIEW: CPT

## 2022-09-06 PROCEDURE — 76000 FLUOROSCOPY <1 HR PHYS/QHP: CPT | Performed by: RADIOLOGY

## 2022-09-06 PROCEDURE — 25010000002 HEPARIN (PORCINE) PER 1000 UNITS: Performed by: SURGERY

## 2022-09-06 PROCEDURE — 36561 INSERT TUNNELED CV CATH: CPT | Performed by: SURGERY

## 2022-09-06 PROCEDURE — 25010000002 CEFAZOLIN PER 500 MG: Performed by: SURGERY

## 2022-09-06 PROCEDURE — 25010000002 ONDANSETRON PER 1 MG: Performed by: NURSE ANESTHETIST, CERTIFIED REGISTERED

## 2022-09-06 PROCEDURE — 77001 FLUOROGUIDE FOR VEIN DEVICE: CPT | Performed by: SURGERY

## 2022-09-06 DEVICE — PRT INTRO VASC/INTERV VORTEX FILL/HL DETACH/POLYURET/CATH 8F: Type: IMPLANTABLE DEVICE | Site: CHEST | Status: FUNCTIONAL

## 2022-09-06 RX ORDER — FENTANYL CITRATE 50 UG/ML
INJECTION, SOLUTION INTRAMUSCULAR; INTRAVENOUS AS NEEDED
Status: DISCONTINUED | OUTPATIENT
Start: 2022-09-06 | End: 2022-09-06 | Stop reason: SURG

## 2022-09-06 RX ORDER — MIDAZOLAM HYDROCHLORIDE 1 MG/ML
1 INJECTION INTRAMUSCULAR; INTRAVENOUS
Status: DISCONTINUED | OUTPATIENT
Start: 2022-09-06 | End: 2022-09-06 | Stop reason: HOSPADM

## 2022-09-06 RX ORDER — SODIUM CHLORIDE 0.9 % (FLUSH) 0.9 %
10 SYRINGE (ML) INJECTION AS NEEDED
Status: DISCONTINUED | OUTPATIENT
Start: 2022-09-06 | End: 2022-09-06 | Stop reason: HOSPADM

## 2022-09-06 RX ORDER — SODIUM CHLORIDE, SODIUM LACTATE, POTASSIUM CHLORIDE, CALCIUM CHLORIDE 600; 310; 30; 20 MG/100ML; MG/100ML; MG/100ML; MG/100ML
INJECTION, SOLUTION INTRAVENOUS CONTINUOUS PRN
Status: DISCONTINUED | OUTPATIENT
Start: 2022-09-06 | End: 2022-09-06 | Stop reason: SURG

## 2022-09-06 RX ORDER — SODIUM CHLORIDE 9 MG/ML
INJECTION, SOLUTION INTRAVENOUS AS NEEDED
Status: DISCONTINUED | OUTPATIENT
Start: 2022-09-06 | End: 2022-09-06 | Stop reason: HOSPADM

## 2022-09-06 RX ORDER — SODIUM CHLORIDE, SODIUM LACTATE, POTASSIUM CHLORIDE, CALCIUM CHLORIDE 600; 310; 30; 20 MG/100ML; MG/100ML; MG/100ML; MG/100ML
125 INJECTION, SOLUTION INTRAVENOUS ONCE
Status: COMPLETED | OUTPATIENT
Start: 2022-09-06 | End: 2022-09-06

## 2022-09-06 RX ORDER — SODIUM CHLORIDE 0.9 % (FLUSH) 0.9 %
10 SYRINGE (ML) INJECTION EVERY 12 HOURS SCHEDULED
Status: DISCONTINUED | OUTPATIENT
Start: 2022-09-06 | End: 2022-09-06 | Stop reason: HOSPADM

## 2022-09-06 RX ORDER — OXYCODONE HYDROCHLORIDE AND ACETAMINOPHEN 5; 325 MG/1; MG/1
1 TABLET ORAL ONCE AS NEEDED
Status: COMPLETED | OUTPATIENT
Start: 2022-09-06 | End: 2022-09-06

## 2022-09-06 RX ORDER — FENTANYL CITRATE 50 UG/ML
50 INJECTION, SOLUTION INTRAMUSCULAR; INTRAVENOUS
Status: DISCONTINUED | OUTPATIENT
Start: 2022-09-06 | End: 2022-09-06 | Stop reason: HOSPADM

## 2022-09-06 RX ORDER — FAMOTIDINE 10 MG/ML
INJECTION, SOLUTION INTRAVENOUS AS NEEDED
Status: DISCONTINUED | OUTPATIENT
Start: 2022-09-06 | End: 2022-09-06 | Stop reason: SURG

## 2022-09-06 RX ORDER — SODIUM CHLORIDE, SODIUM LACTATE, POTASSIUM CHLORIDE, CALCIUM CHLORIDE 600; 310; 30; 20 MG/100ML; MG/100ML; MG/100ML; MG/100ML
100 INJECTION, SOLUTION INTRAVENOUS ONCE AS NEEDED
Status: DISCONTINUED | OUTPATIENT
Start: 2022-09-06 | End: 2022-09-06 | Stop reason: HOSPADM

## 2022-09-06 RX ORDER — PROPOFOL 10 MG/ML
VIAL (ML) INTRAVENOUS AS NEEDED
Status: DISCONTINUED | OUTPATIENT
Start: 2022-09-06 | End: 2022-09-06 | Stop reason: SURG

## 2022-09-06 RX ORDER — ONDANSETRON 2 MG/ML
4 INJECTION INTRAMUSCULAR; INTRAVENOUS AS NEEDED
Status: DISCONTINUED | OUTPATIENT
Start: 2022-09-06 | End: 2022-09-06 | Stop reason: HOSPADM

## 2022-09-06 RX ORDER — IPRATROPIUM BROMIDE AND ALBUTEROL SULFATE 2.5; .5 MG/3ML; MG/3ML
3 SOLUTION RESPIRATORY (INHALATION) ONCE AS NEEDED
Status: DISCONTINUED | OUTPATIENT
Start: 2022-09-06 | End: 2022-09-06 | Stop reason: HOSPADM

## 2022-09-06 RX ORDER — ONDANSETRON 2 MG/ML
INJECTION INTRAMUSCULAR; INTRAVENOUS AS NEEDED
Status: DISCONTINUED | OUTPATIENT
Start: 2022-09-06 | End: 2022-09-06 | Stop reason: SURG

## 2022-09-06 RX ORDER — MIDAZOLAM HYDROCHLORIDE 1 MG/ML
0.5 INJECTION INTRAMUSCULAR; INTRAVENOUS
Status: DISCONTINUED | OUTPATIENT
Start: 2022-09-06 | End: 2022-09-06 | Stop reason: HOSPADM

## 2022-09-06 RX ORDER — HYDROCODONE BITARTRATE AND ACETAMINOPHEN 7.5; 325 MG/1; MG/1
1 TABLET ORAL 4 TIMES DAILY PRN
Qty: 8 TABLET | Refills: 0 | Status: SHIPPED | OUTPATIENT
Start: 2022-09-06 | End: 2022-10-19

## 2022-09-06 RX ADMIN — ONDANSETRON 4 MG: 2 INJECTION INTRAMUSCULAR; INTRAVENOUS at 10:00

## 2022-09-06 RX ADMIN — FENTANYL CITRATE 100 MCG: 50 INJECTION INTRAMUSCULAR; INTRAVENOUS at 10:03

## 2022-09-06 RX ADMIN — SODIUM CHLORIDE, POTASSIUM CHLORIDE, SODIUM LACTATE AND CALCIUM CHLORIDE 125 ML/HR: 600; 310; 30; 20 INJECTION, SOLUTION INTRAVENOUS at 09:28

## 2022-09-06 RX ADMIN — OXYCODONE HYDROCHLORIDE AND ACETAMINOPHEN 1 TABLET: 5; 325 TABLET ORAL at 11:05

## 2022-09-06 RX ADMIN — SODIUM CHLORIDE, POTASSIUM CHLORIDE, SODIUM LACTATE AND CALCIUM CHLORIDE: 600; 310; 30; 20 INJECTION, SOLUTION INTRAVENOUS at 09:57

## 2022-09-06 RX ADMIN — PROPOFOL 100 MG: 10 INJECTION, EMULSION INTRAVENOUS at 10:03

## 2022-09-06 RX ADMIN — CEFAZOLIN 2 G: 2 INJECTION, POWDER, FOR SOLUTION INTRAMUSCULAR; INTRAVENOUS at 10:00

## 2022-09-06 RX ADMIN — FAMOTIDINE 20 MG: 10 INJECTION INTRAVENOUS at 10:00

## 2022-09-06 NOTE — ANESTHESIA PROCEDURE NOTES
Airway  Urgency: elective    Date/Time: 9/6/2022 10:04 AM    General Information and Staff    Patient location during procedure: OR  CRNA/CAA: Omaira Sosa CRNA    Indications and Patient Condition    Preoxygenated: yes      Final Airway Details  Final airway type: supraglottic airway      Successful airway: unique  Size 4    Number of attempts at approach: 1  Assessment: lips, teeth, and gum same as pre-op and atraumatic intubation

## 2022-09-06 NOTE — ANESTHESIA PREPROCEDURE EVALUATION
Anesthesia Evaluation     no history of anesthetic complications:  NPO Solid Status: > 8 hours  NPO Liquid Status: > 8 hours           Airway   Mallampati: II  TM distance: >3 FB  Neck ROM: full  No difficulty expected  Dental    (+) edentulous    Pulmonary - normal exam   (+) a smoker, lung cancer, COPD,   Cardiovascular - normal exam    (+) pacemaker, hypertension, CAD, hyperlipidemia,       Neuro/Psych  GI/Hepatic/Renal/Endo      Musculoskeletal     Abdominal  - normal exam    Bowel sounds: normal.   Substance History      OB/GYN          Other      history of cancer active                      Anesthesia Plan    ASA 3     general     intravenous induction     Anesthetic plan, risks, benefits, and alternatives have been provided, discussed and informed consent has been obtained with: patient.        CODE STATUS:

## 2022-09-06 NOTE — ANESTHESIA POSTPROCEDURE EVALUATION
Patient: Serenity Villegas    Procedure Summary     Date: 09/06/22 Room / Location:  COR OR 01 /  COR OR    Anesthesia Start: 0957 Anesthesia Stop: 1043    Procedure: INSERTION VENOUS ACCESS DEVICE (N/A ) Diagnosis:       Small cell lung cancer (HCC)      (Small cell lung cancer (HCC) [C34.90])    Surgeons: Ting Sarah MD Provider: Anshu Laird MD    Anesthesia Type: general ASA Status: 3          Anesthesia Type: general    Vitals  Vitals Value Taken Time   /83 09/06/22 1114   Temp 97.1 °F (36.2 °C) 09/06/22 1044   Pulse 72 09/06/22 1114   Resp 21 09/06/22 1114   SpO2 95 % 09/06/22 1114           Post Anesthesia Care and Evaluation    Patient location during evaluation: bedside  Patient participation: complete - patient participated  Level of consciousness: awake and alert  Pain score: 1  Pain management: adequate    Airway patency: patent  Anesthetic complications: No anesthetic complications  PONV Status: none  Cardiovascular status: acceptable  Respiratory status: acceptable  Hydration status: acceptable

## 2022-09-06 NOTE — OP NOTE
[id number] insertion    Surgeon:  Ting Sarah M.D., BOLIVAR    Assistant: None    Indications: This patient presents for placement of an [id number] for chemotherapy    Pre-operative Diagnosis: small cell lung cancer    Post-operative Diagnosis: Same    Anesthesia: General    Procedure Details:   After obtaining informed consent and receiving preoperative antibiotic patient was taken to the operating room and placed in the supine position.  After administration of anesthesia the chest and neck were prepped and draped in sterile fashion.  The right subclavian vein was cannulated with use of the Seldinger technique.  The guidewire was passed and position was confirmed under fluoroscopy.  Following this, a transverse incision was made inferior to the site of guidewire entry and carried down to the pectoralis fascia.  A pocket large enough to admit the port without tension was created.  The guidewire was brought through the incision.    The port and catheter were attached and flushed with heparinized saline, 50 units per mL.  The catheter was cut to appropriate length.  Under fluoroscopic guidance, the dilator sheath was passed over the guidewire.  The guidewire and dilator were removed and the catheter was passed over the peel-away sheath which was then removed.  There was no resistance to irrigation or aspiration of blood.  Fluoroscopy confirmed the catheter to be in good position without kinking.  The port was then sutured in with 3-0 Prolene sutures.  After ensuring hemostasis, the skin was closed in a 2-layer closure of 3-0 Vicryl sutures to Rosemary's fascia.  The skin was closed with a 4-0 Vicryl subcuticular stitch.  Dressing was placed.     Patient tolerated the procedure well, was taken to the recovery room in stable condition where chest x-ray pending    Instrument, sponge, and needle counts were correct at closure and at the conclusion of the case.     Findings:  Flouroscopy demonstrated good position  of the port    Estimated Blood Loss: 10 mL or less    Blood administered: None           Drains: None           Specimens: None        Grafts and Implants: Yes         Complications: None           Condition: Stable

## 2022-09-07 ENCOUNTER — DOCUMENTATION (OUTPATIENT)
Dept: ONCOLOGY | Facility: HOSPITAL | Age: 67
End: 2022-09-07

## 2022-09-07 ENCOUNTER — INFUSION (OUTPATIENT)
Dept: ONCOLOGY | Facility: HOSPITAL | Age: 67
End: 2022-09-07

## 2022-09-07 ENCOUNTER — OFFICE VISIT (OUTPATIENT)
Dept: ONCOLOGY | Facility: CLINIC | Age: 67
End: 2022-09-07

## 2022-09-07 ENCOUNTER — TELEPHONE (OUTPATIENT)
Dept: PHARMACY | Facility: HOSPITAL | Age: 67
End: 2022-09-07

## 2022-09-07 ENCOUNTER — LAB (OUTPATIENT)
Dept: ONCOLOGY | Facility: HOSPITAL | Age: 67
End: 2022-09-07

## 2022-09-07 VITALS
WEIGHT: 128 LBS | OXYGEN SATURATION: 96 % | BODY MASS INDEX: 22.67 KG/M2 | DIASTOLIC BLOOD PRESSURE: 86 MMHG | RESPIRATION RATE: 18 BRPM | HEART RATE: 89 BPM | TEMPERATURE: 97.8 F | SYSTOLIC BLOOD PRESSURE: 143 MMHG

## 2022-09-07 VITALS
RESPIRATION RATE: 18 BRPM | SYSTOLIC BLOOD PRESSURE: 143 MMHG | OXYGEN SATURATION: 97 % | WEIGHT: 128 LBS | BODY MASS INDEX: 22.67 KG/M2 | TEMPERATURE: 97.8 F | HEART RATE: 89 BPM | DIASTOLIC BLOOD PRESSURE: 86 MMHG

## 2022-09-07 DIAGNOSIS — C34.90 SMALL CELL LUNG CANCER: Primary | ICD-10-CM

## 2022-09-07 DIAGNOSIS — Z95.828 PORT-A-CATH IN PLACE: ICD-10-CM

## 2022-09-07 DIAGNOSIS — C34.90 SMALL CELL LUNG CANCER: ICD-10-CM

## 2022-09-07 LAB
ALBUMIN SERPL-MCNC: 3.95 G/DL (ref 3.5–5.2)
ALBUMIN/GLOB SERPL: 1.8 G/DL
ALP SERPL-CCNC: 56 U/L (ref 39–117)
ALT SERPL W P-5'-P-CCNC: 8 U/L (ref 1–33)
ANION GAP SERPL CALCULATED.3IONS-SCNC: 10.8 MMOL/L (ref 5–15)
AST SERPL-CCNC: 18 U/L (ref 1–32)
BASOPHILS # BLD AUTO: 0.08 10*3/MM3 (ref 0–0.2)
BASOPHILS NFR BLD AUTO: 1.1 % (ref 0–1.5)
BILIRUB SERPL-MCNC: 0.3 MG/DL (ref 0–1.2)
BUN SERPL-MCNC: 8 MG/DL (ref 8–23)
BUN/CREAT SERPL: 8.2 (ref 7–25)
CALCIUM SPEC-SCNC: 9.5 MG/DL (ref 8.6–10.5)
CHLORIDE SERPL-SCNC: 102 MMOL/L (ref 98–107)
CO2 SERPL-SCNC: 22.2 MMOL/L (ref 22–29)
CREAT SERPL-MCNC: 0.98 MG/DL (ref 0.57–1)
DEPRECATED RDW RBC AUTO: 44 FL (ref 37–54)
EGFRCR SERPLBLD CKD-EPI 2021: 64.2 ML/MIN/1.73
EOSINOPHIL # BLD AUTO: 0.17 10*3/MM3 (ref 0–0.4)
EOSINOPHIL NFR BLD AUTO: 2.3 % (ref 0.3–6.2)
ERYTHROCYTE [DISTWIDTH] IN BLOOD BY AUTOMATED COUNT: 13 % (ref 12.3–15.4)
GLOBULIN UR ELPH-MCNC: 2.2 GM/DL
GLUCOSE SERPL-MCNC: 99 MG/DL (ref 65–99)
HCT VFR BLD AUTO: 34.5 % (ref 34–46.6)
HGB BLD-MCNC: 11.7 G/DL (ref 12–15.9)
IMM GRANULOCYTES # BLD AUTO: 0.02 10*3/MM3 (ref 0–0.05)
IMM GRANULOCYTES NFR BLD AUTO: 0.3 % (ref 0–0.5)
LYMPHOCYTES # BLD AUTO: 1.66 10*3/MM3 (ref 0.7–3.1)
LYMPHOCYTES NFR BLD AUTO: 22.6 % (ref 19.6–45.3)
MAGNESIUM SERPL-MCNC: 2 MG/DL (ref 1.6–2.4)
MCH RBC QN AUTO: 31.4 PG (ref 26.6–33)
MCHC RBC AUTO-ENTMCNC: 33.9 G/DL (ref 31.5–35.7)
MCV RBC AUTO: 92.5 FL (ref 79–97)
MONOCYTES # BLD AUTO: 0.81 10*3/MM3 (ref 0.1–0.9)
MONOCYTES NFR BLD AUTO: 11 % (ref 5–12)
NEUTROPHILS NFR BLD AUTO: 4.62 10*3/MM3 (ref 1.7–7)
NEUTROPHILS NFR BLD AUTO: 62.7 % (ref 42.7–76)
NRBC BLD AUTO-RTO: 0 /100 WBC (ref 0–0.2)
PLATELET # BLD AUTO: 209 10*3/MM3 (ref 140–450)
PMV BLD AUTO: 9 FL (ref 6–12)
POTASSIUM SERPL-SCNC: 4.7 MMOL/L (ref 3.5–5.2)
PROT SERPL-MCNC: 6.1 G/DL (ref 6–8.5)
RBC # BLD AUTO: 3.73 10*6/MM3 (ref 3.77–5.28)
SODIUM SERPL-SCNC: 135 MMOL/L (ref 136–145)
WBC NRBC COR # BLD: 7.36 10*3/MM3 (ref 3.4–10.8)

## 2022-09-07 PROCEDURE — 96375 TX/PRO/DX INJ NEW DRUG ADDON: CPT

## 2022-09-07 PROCEDURE — 85025 COMPLETE CBC W/AUTO DIFF WBC: CPT

## 2022-09-07 PROCEDURE — 83735 ASSAY OF MAGNESIUM: CPT

## 2022-09-07 PROCEDURE — 96415 CHEMO IV INFUSION ADDL HR: CPT

## 2022-09-07 PROCEDURE — 25010000002 FOSAPREPITANT PER 1 MG: Performed by: NURSE PRACTITIONER

## 2022-09-07 PROCEDURE — 96368 THER/DIAG CONCURRENT INF: CPT

## 2022-09-07 PROCEDURE — 25010000002 HEPARIN LOCK FLUSH PER 10 UNITS: Performed by: INTERNAL MEDICINE

## 2022-09-07 PROCEDURE — 25010000002 CISPLATIN PER 50 MG: Performed by: NURSE PRACTITIONER

## 2022-09-07 PROCEDURE — 96417 CHEMO IV INFUS EACH ADDL SEQ: CPT

## 2022-09-07 PROCEDURE — 25010000002 DEXAMETHASONE SODIUM PHOSPHATE 20 MG/5ML SOLUTION: Performed by: NURSE PRACTITIONER

## 2022-09-07 PROCEDURE — 25010000002 SODIUM CHLORIDE 0.9 % WITH KCL 20 MEQ 20-0.9 MEQ/L-% SOLUTION: Performed by: INTERNAL MEDICINE

## 2022-09-07 PROCEDURE — 25010000002 MAGNESIUM SULFATE 2 GM/50ML SOLUTION: Performed by: INTERNAL MEDICINE

## 2022-09-07 PROCEDURE — 96413 CHEMO IV INFUSION 1 HR: CPT

## 2022-09-07 PROCEDURE — 80053 COMPREHEN METABOLIC PANEL: CPT

## 2022-09-07 PROCEDURE — 25010000002 ETOPOSIDE 500 MG/25ML SOLUTION 25 ML VIAL: Performed by: NURSE PRACTITIONER

## 2022-09-07 PROCEDURE — 99215 OFFICE O/P EST HI 40 MIN: CPT | Performed by: NURSE PRACTITIONER

## 2022-09-07 PROCEDURE — 25010000002 PALONOSETRON 0.25 MG/5ML SOLUTION PREFILLED SYRINGE: Performed by: NURSE PRACTITIONER

## 2022-09-07 PROCEDURE — 96367 TX/PROPH/DG ADDL SEQ IV INF: CPT

## 2022-09-07 RX ORDER — SODIUM CHLORIDE 0.9 % (FLUSH) 0.9 %
10 SYRINGE (ML) INJECTION AS NEEDED
Status: DISCONTINUED | OUTPATIENT
Start: 2022-09-07 | End: 2022-09-07 | Stop reason: HOSPADM

## 2022-09-07 RX ORDER — SODIUM CHLORIDE 9 MG/ML
250 INJECTION, SOLUTION INTRAVENOUS ONCE
Status: CANCELLED | OUTPATIENT
Start: 2022-09-07

## 2022-09-07 RX ORDER — SODIUM CHLORIDE 0.9 % (FLUSH) 0.9 %
10 SYRINGE (ML) INJECTION AS NEEDED
Status: CANCELLED | OUTPATIENT
Start: 2022-09-08

## 2022-09-07 RX ORDER — OLANZAPINE 5 MG/1
TABLET ORAL
Qty: 3 TABLET | Refills: 4 | Status: SHIPPED | OUTPATIENT
Start: 2022-09-07

## 2022-09-07 RX ORDER — OLANZAPINE 5 MG/1
5 TABLET ORAL ONCE
Status: CANCELLED | OUTPATIENT
Start: 2022-09-07 | End: 2022-09-07

## 2022-09-07 RX ORDER — HEPARIN SODIUM (PORCINE) LOCK FLUSH IV SOLN 100 UNIT/ML 100 UNIT/ML
500 SOLUTION INTRAVENOUS AS NEEDED
Status: DISCONTINUED | OUTPATIENT
Start: 2022-09-07 | End: 2022-09-07 | Stop reason: HOSPADM

## 2022-09-07 RX ORDER — PALONOSETRON 0.05 MG/ML
0.25 INJECTION, SOLUTION INTRAVENOUS ONCE
Status: CANCELLED | OUTPATIENT
Start: 2022-09-07

## 2022-09-07 RX ORDER — SODIUM CHLORIDE AND POTASSIUM CHLORIDE 150; 900 MG/100ML; MG/100ML
500 INJECTION, SOLUTION INTRAVENOUS CONTINUOUS
Status: DISPENSED | OUTPATIENT
Start: 2022-09-07 | End: 2022-09-07

## 2022-09-07 RX ORDER — ESCITALOPRAM OXALATE 10 MG/1
10 TABLET ORAL DAILY
Qty: 30 TABLET | Refills: 1 | Status: SHIPPED | OUTPATIENT
Start: 2022-09-07 | End: 2022-11-29 | Stop reason: SDUPTHER

## 2022-09-07 RX ORDER — PALONOSETRON 0.05 MG/ML
0.25 INJECTION, SOLUTION INTRAVENOUS ONCE
Status: COMPLETED | OUTPATIENT
Start: 2022-09-07 | End: 2022-09-07

## 2022-09-07 RX ORDER — LORATADINE 10 MG/1
10 TABLET ORAL DAILY
Qty: 30 TABLET | Refills: 1 | Status: SHIPPED | OUTPATIENT
Start: 2022-09-07

## 2022-09-07 RX ORDER — HEPARIN SODIUM (PORCINE) LOCK FLUSH IV SOLN 100 UNIT/ML 100 UNIT/ML
500 SOLUTION INTRAVENOUS AS NEEDED
Status: CANCELLED | OUTPATIENT
Start: 2022-09-08

## 2022-09-07 RX ORDER — PROCHLORPERAZINE MALEATE 10 MG
10 TABLET ORAL EVERY 6 HOURS PRN
Qty: 60 TABLET | Refills: 1 | Status: SHIPPED | OUTPATIENT
Start: 2022-09-07 | End: 2022-11-04

## 2022-09-07 RX ORDER — ONDANSETRON HYDROCHLORIDE 8 MG/1
8 TABLET, FILM COATED ORAL EVERY 8 HOURS PRN
Qty: 30 TABLET | Refills: 1 | Status: SHIPPED | OUTPATIENT
Start: 2022-09-07

## 2022-09-07 RX ORDER — OLANZAPINE 5 MG/1
5 TABLET ORAL ONCE
Status: COMPLETED | OUTPATIENT
Start: 2022-09-07 | End: 2022-09-07

## 2022-09-07 RX ORDER — LIDOCAINE AND PRILOCAINE 25; 25 MG/G; MG/G
CREAM TOPICAL
Qty: 30 G | Refills: 1 | Status: SHIPPED | OUTPATIENT
Start: 2022-09-07

## 2022-09-07 RX ORDER — MAGNESIUM SULFATE HEPTAHYDRATE 40 MG/ML
2 INJECTION, SOLUTION INTRAVENOUS ONCE
Status: COMPLETED | OUTPATIENT
Start: 2022-09-07 | End: 2022-09-07

## 2022-09-07 RX ADMIN — MAGNESIUM SULFATE HEPTAHYDRATE 2 G: 40 INJECTION, SOLUTION INTRAVENOUS at 11:28

## 2022-09-07 RX ADMIN — SODIUM CHLORIDE AND POTASSIUM CHLORIDE 500 ML/HR: .9; .15 SOLUTION INTRAVENOUS at 12:31

## 2022-09-07 RX ADMIN — CISPLATIN 120 MG: 1 INJECTION INTRAVENOUS at 11:28

## 2022-09-07 RX ADMIN — OLANZAPINE 5 MG: 5 TABLET, FILM COATED ORAL at 10:20

## 2022-09-07 RX ADMIN — PALONOSETRON 0.25 MG: 0.25 INJECTION, SOLUTION INTRAVENOUS at 10:22

## 2022-09-07 RX ADMIN — FOSAPREPITANT 150 MG: 150 INJECTION, POWDER, LYOPHILIZED, FOR SOLUTION INTRAVENOUS at 10:37

## 2022-09-07 RX ADMIN — DEXAMETHASONE SODIUM PHOSPHATE 12 MG: 4 INJECTION, SOLUTION INTRA-ARTICULAR; INTRALESIONAL; INTRAMUSCULAR; INTRAVENOUS; SOFT TISSUE at 10:23

## 2022-09-07 RX ADMIN — SODIUM CHLORIDE AND POTASSIUM CHLORIDE 500 ML/HR: .9; .15 SOLUTION INTRAVENOUS at 10:21

## 2022-09-07 RX ADMIN — ETOPOSIDE 160 MG: 20 INJECTION INTRAVENOUS at 14:08

## 2022-09-07 RX ADMIN — Medication 500 UNITS: at 15:32

## 2022-09-07 RX ADMIN — Medication 10 ML: at 15:32

## 2022-09-07 NOTE — PROGRESS NOTES
CHEMOTHERAPY PREPARATION    Serenity Villegas  [id number]  1955    Chief Complaint: chemo education     History of present illness:  Serenity Villegas is a 67 y.o. year old female who is here today for chemotherapy preparation and needs assessment. The patient has been diagnosed with small cell lung cancer and is scheduled to begin treatment with cisplatin/etoposide today. At present, she is doing well and denies any specific complaints.       Oncology History:    Oncology/Hematology History   Small cell lung cancer (HCC)   8/26/2022 Initial Diagnosis    Small cell lung cancer (HCC)     8/29/2022 -  Chemotherapy    OP LUNG CISplatin 75 mg/m2 / Etoposide 100mg/m2 + RT         Past Medical History:   Diagnosis Date   • Arthritis    • Cancer (HCC)    • Coronary artery disease    • Heart disease    • Heartburn    • Hyperlipidemia    • Hypertension    • Right lower lobe lung mass        Past Surgical History:   Procedure Laterality Date   • BRONCHOSCOPY N/A 8/22/2022    Procedure: BRONCHOSCOPY WITH ENDOBRONCHIAL ULTRASOUND;  Surgeon: Amari Murrieta MD;  Location: [facility];  Service: Pulmonary;  Laterality: N/A;   • CARDIAC SURGERY      3 VESELS  2002   [facility]   • HYSTERECTOMY      [facility]   • PACEMAKER IMPLANTATION      [facility]       Family History   Problem Relation Age of Onset   • Cancer Mother    • Heart disease Father    • Heart attack Father        Medications: The current medication list was reviewed and reconciled.     Allergies:  has No Known Allergies.    Review of Systems:  A comprehensive 14 point review of systems was performed. Significant findings as mentioned above. All other systems reviewed and are negative.     Physical Exam:    Vitals:    09/07/22 0831   BP: 143/86   Pulse: 89   Resp: 18   Temp: 97.8 °F (36.6 °C)   SpO2: 97%     Vitals:    09/07/22 0831   PainSc: 0-No pain       ECOG: (0) Fully Active - Able to Carry On All Pre-disease Performance Without Restriction  General: Well  "developed, well nourished white female. In no acute distress.   HEENT: Pupils equally round and reactive to light. Extraocular muscles intact.   Cardiovascular: Regular rate and rhythm. No murmurs, rubs or gallops.  Lungs: Clear to auscultation bilaterally.  Abdomen: Soft, nontender, nondistended. Normoactive bowel sounds x 4 quadrants.   Extremities: No clubbing, cyanosis or edema bilaterally.  Skin: Warm, dry, intact.   Neuro: Grossly non-focal exam.  Psych: Alert and oriented x 3.             NEEDS ASSESSMENTS    Genetics  The patient's new diagnosis and family history have been reviewed for genetic counseling needs. A genetic referral is not recommended.     Barriers to care  A barriers form was also completed by the patient today. We discussed services offered by our facility to help her have adequate access to care. The patient was given the name and card for our Oncology Social Worker, Lelo Otto. Based upon barriers assessment today, the patient will not require a follow-up call from the  to further discuss needs.     VAD Assessment  The patient and I discussed planned intervenous chemotherapy as well as other IV treatments that are often needed throughout the course of treatment. These may include, but are not limited to blood transfusions, antibiotics, and IV hydration. The vasculature does not appear to be adequate for multiple peripheral IVs throughout their treatment course. Discussed risks and benefits of VADs. The patient had Port-A-Cath placed on 09/06/2022 per Dr. Sarah.     Advanced Care Planning  The patient and I discussed advanced care planning, \"Conversations that Matter\".   This service was offered, free of charge, for development of advance directives with a certified ACP facilitator.  The patient does not have an up-to-date advanced directive. This document is not on file with our office. The patient is not interested in an appointment with one of our facilitators to create or " update their advanced directives.      Palliative Care  The patient and I discussed palliative care services. Palliative care is not the same as Hospice care. This is specialized medical care for people living with serious illness with the goal of improving quality of life for the patient and their family. [facility] has partnered with [facility] to offer our patients outpatient palliative care early along with their treatment to assist in coordination of care, symptom management, pain management, and medical decision making.  Oncology criteria for palliative care referral is not met at this time. The patient is not interested in a palliative care consultation.     Additional Referral needs  none      CHEMOTHERAPY EDUCATION    Booklets Given: Chemotherapy and You [x]  Eating Hints [x]    Sexuality/Fertility Books []      Chemotherapy/Biotherapy Education Sheets: (list all that apply)  nausea management, acid reflux management, diarrhea management, Cancer resourse contacts information, skin and mouth care and vaccination information                                                                                                                                                                 Chemotherapy Regimen:   Treatment Plans     Name Type Hold Status Plan Dates Plan Provider       Active    OP LUNG CISplatin 75 mg/m2 / Etoposide 100mg/m2 + RT ONCOLOGY TREATMENT On Automatic Hold  8/28/2022 - Present Rhianna Wood MD                   TOPICS EDUCATION PROVIDED COMMENTS   ANEMIA:  role of RBC, cause, s/s, ways to manage, role of transfusion [x]    THROMBOCYTOPENIA:  role of platelet, cause, s/s, ways to prevent bleeding, things to avoid, when to seek help [x]    NEUTROPENIA:  role of WBC, cause, infection precautions, s/s of infection, when to call MD [x]    NUTRITION & APPETITE CHANGES:  importance of maintaining healthy diet & weight, ways to manage to improve intake, dietary consult, exercise  regimen [x]    DIARRHEA:  causes, s/s of dehydration, ways to manage, dietary changes, when to call MD [x]    CONSTIPATION:  causes, ways to manage, dietary changes, when to call MD [x]    NAUSEA & VOMITING:  cause, use of antiemetics, dietary changes, when to call MD [x]    MOUTH SORES:  causes, oral care, ways to manage [x]    ALOPECIA:  cause, ways to manage, resources [x]    INFERTILITY & SEXUALITY:  causes, fertility preservation options, sexuality changes, ways to manage, importance of birth control [x]    NERVOUS SYSTEM CHANGES:  causes, s/s, neuropathies, cognitive changes, ways to manage [x]    PAIN:  causes, ways to manage [x]    SKIN & NAIL CHANGES:  cause, s/s, ways to manage [x]    ORGAN TOXICITIES:  cause, s/s, need for diagnostic tests, labs, when to notify MD [x]    SURVIVORSHIP:  distress, distress assessment, secondary malignancies, early/late effects, follow-up, social issues, social support [x]    HOME CARE:  use of spill kits, storing of PO chemo, how to manage bodily fluids [x]    MISCELLANEOUS:  drug interactions, administration, vesicant, et [x]        Assessment and Plan:    Diagnoses and all orders for this visit:    1. Small cell lung cancer (HCC) (Primary)    Other orders  -     ondansetron (ZOFRAN) 8 MG tablet; Take 1 tablet by mouth Every 8 (Eight) Hours As Needed for Nausea or Vomiting.  Dispense: 30 tablet; Refill: 1  -     OLANZapine (zyPREXA) 5 MG tablet; Take one tablet at HS on days 2, 3 and 4 after chemotherapy.  Dispense: 3 tablet; Refill: 4  -     prochlorperazine (COMPAZINE) 10 MG tablet; Take 1 tablet by mouth Every 6 (Six) Hours As Needed for Nausea or Vomiting.  Dispense: 60 tablet; Refill: 1  -     lidocaine-prilocaine (EMLA) 2.5-2.5 % cream; Apply to Port-A-Cath site 30 minutes prior to arrival at infusion clinic.  Dispense: 30 g; Refill: 1  -     loratadine (CLARITIN) 10 MG tablet; Take 1 tablet by mouth Daily.  Dispense: 30 tablet; Refill: 1      The patient and I have  reviewed their new cancer diagnosis and scheduled treatment plan. Needs assessment was completed including genetics, barriers to care, VAD evaluation, advanced care planning, and palliative care services. Referrals have been ordered as appropriate based upon our evaluation and patient desires.     Chemotherapy teaching was also completed today as documented above. Adequate time was given to answer all questions to her satisfaction. Patient and family are aware of their care team members and contact information if they have questions or problems throughout the treatment course. Needs assessments and education has been completed. The patient is adequately prepared to begin treatment as scheduled.     Reviewed with patient education regarding EMLA cream, Compazine, Zyprexa and Zofran prescriptions sent to pharmacy.       I advised the patient that she can take Tylenol or Ibuprofen as needed for aches/pains related to cancer/treatment. I also advised patient she could use Senakot or Miralax as needed for constipation or Imodium as needed for diarrhea.       I reviewed with the patient the care team members. I also reviewed the option of the acute care visits provided through our oncology office for evaluation and management of symptoms related to treatment. Patient was provided with phone number to call during regular office hours [phone] press #1 and for treatment related questions call [phone] to speak to a nurse. If after hours or on the weekend call [phone] and ask to page the MD on call for [facility] On[facility] services.    Anxiety/Depression: RX provided today for Lexapro 10 mg daily. Advised patient that it may take 1-4 weeks until full benefit is achieved.     I spent 60 minutes with Serenity Villegas today.  In the office today, more than 50% of this time was spent face-to-face with her  in counseling / coordination of care, reviewing her interim medical history and counseling on the current  treatment plan.  All questions were answered to her satisfaction.           Electronically Signed by: DINORAH Garner , September 7, 2022 09:54 EDT

## 2022-09-07 NOTE — TELEPHONE ENCOUNTER
Called pharmacy to inform them PA was approved on Emla cream. Attempted to call patient to update, but no answer and voicemail was full.

## 2022-09-07 NOTE — PROGRESS NOTES
Patient is 68 Y/O white female diagnosed with Small Cell Lung Cancer.    SS completed psycho social assessment with patient during radiation consultation on 08/31/2022.  Please see full note for psycho social assessment details.     Patient in clinic today receiving her first cycle of chemotherapy.    Patient completed NCCN Distress Screening and scored a 8 out of 10.    SS received referral for patient scoring an 8 out of 10.    Oncology Distress Level 8 - 10  Received: Today  Omaira Bolden RN Taylor, Pamela F, MSW    Ambulatory Referral to ONC Social Work [[id number]]    Electronically signed by: Omaira Bolden RN on 09/07/22 1048 Status: Active   Ordering user: Omaira Bolden RN 09/07/22 1048 Ordering provider: Gudelia Ambriz APRN   Authorized by: Gudelia Ambriz APRN   Cosigning events   Electronically cosigned by Gudelia Ambriz APRN 09/07/22 1055 for Ordering   Frequency:  09/07/22 -     Diagnoses   Small cell lung cancer (HCC) [C34.90]     Questionnaire    Question Answer   Follow-up needed: Yes Comment - distress screening, new oncology patient     SS spoke with patient this date in chemo clinic.  SS explained to patient the role of oncology social worker and services available.    Patient in clinic this date.    Distress Screening Follow-up    Diagnosis: Small Cell Lung Cancer    Location of Distress Screening: Medical Oncology    Distress Level: 8 (Lelo Otto CSCIRO aware and spoke with patient on the phone while in clinic today.) (9/7/2022 10:47 AM)    Physical Concerns:    Fatigue: No  Pain: No  Sleep: No  Substance abuse: No    Practical Problems:    : No  Housing: No  Transportation: No  Treatment decisions: Yes    Emotional Concerns:     Family Concerns:    Ability to have children: No    Spiritual Concerns:      Interventions: Provided time for patient to vent, talk and express herself emotionally.  SS offered supportive counseling  services and information on virtual support groups.  Patient declines at this time.  Patient states that she doesn't anti depressant for anxiety and depression, but agreeable to provider prescribing anti depressant.    SS spoke with patient's nurse to make aware that patient requesting anti depressant.  SS contacted DINORAH Galeas ext [phone] who is agreeable to prescribing Lexapro to patient.  Lexapro to be called in to [facility] and patient to  medication today.    SS completed [employer] application on 8/31/2022.  Patient lives in [facility] at [address] [place] [address] traveling approximately 96 miles.     Patient provided with social workers name and contact information to call with any questions or concerns.      Patient verbalized understanding and being agreeable.    SS will follow.

## 2022-09-08 ENCOUNTER — INFUSION (OUTPATIENT)
Dept: ONCOLOGY | Facility: HOSPITAL | Age: 67
End: 2022-09-08

## 2022-09-08 VITALS
OXYGEN SATURATION: 94 % | RESPIRATION RATE: 18 BRPM | TEMPERATURE: 96.9 F | HEART RATE: 82 BPM | DIASTOLIC BLOOD PRESSURE: 86 MMHG | SYSTOLIC BLOOD PRESSURE: 144 MMHG

## 2022-09-08 DIAGNOSIS — Z95.828 PORT-A-CATH IN PLACE: ICD-10-CM

## 2022-09-08 DIAGNOSIS — C34.90 SMALL CELL LUNG CANCER: Primary | ICD-10-CM

## 2022-09-08 PROCEDURE — 25010000002 HEPARIN LOCK FLUSH PER 10 UNITS: Performed by: INTERNAL MEDICINE

## 2022-09-08 PROCEDURE — 96375 TX/PRO/DX INJ NEW DRUG ADDON: CPT

## 2022-09-08 PROCEDURE — 25010000002 DEXAMETHASONE SODIUM PHOSPHATE 20 MG/5ML SOLUTION: Performed by: INTERNAL MEDICINE

## 2022-09-08 PROCEDURE — 96413 CHEMO IV INFUSION 1 HR: CPT

## 2022-09-08 PROCEDURE — 25010000002 ETOPOSIDE 500 MG/25ML SOLUTION 25 ML VIAL: Performed by: INTERNAL MEDICINE

## 2022-09-08 RX ORDER — SODIUM CHLORIDE 9 MG/ML
250 INJECTION, SOLUTION INTRAVENOUS ONCE
Status: COMPLETED | OUTPATIENT
Start: 2022-09-08 | End: 2022-09-08

## 2022-09-08 RX ORDER — SODIUM CHLORIDE 0.9 % (FLUSH) 0.9 %
10 SYRINGE (ML) INJECTION AS NEEDED
Status: DISCONTINUED | OUTPATIENT
Start: 2022-09-08 | End: 2022-09-08 | Stop reason: HOSPADM

## 2022-09-08 RX ORDER — SODIUM CHLORIDE 0.9 % (FLUSH) 0.9 %
10 SYRINGE (ML) INJECTION AS NEEDED
Status: CANCELLED | OUTPATIENT
Start: 2022-09-09

## 2022-09-08 RX ORDER — HEPARIN SODIUM (PORCINE) LOCK FLUSH IV SOLN 100 UNIT/ML 100 UNIT/ML
500 SOLUTION INTRAVENOUS AS NEEDED
Status: CANCELLED | OUTPATIENT
Start: 2022-09-09

## 2022-09-08 RX ORDER — HEPARIN SODIUM (PORCINE) LOCK FLUSH IV SOLN 100 UNIT/ML 100 UNIT/ML
500 SOLUTION INTRAVENOUS AS NEEDED
Status: DISCONTINUED | OUTPATIENT
Start: 2022-09-08 | End: 2022-09-08 | Stop reason: HOSPADM

## 2022-09-08 RX ADMIN — HEPARIN 500 UNITS: 100 SYRINGE at 11:30

## 2022-09-08 RX ADMIN — DEXAMETHASONE SODIUM PHOSPHATE 12 MG: 4 INJECTION, SOLUTION INTRA-ARTICULAR; INTRALESIONAL; INTRAMUSCULAR; INTRAVENOUS; SOFT TISSUE at 09:45

## 2022-09-08 RX ADMIN — SODIUM CHLORIDE 250 ML: 9 INJECTION, SOLUTION INTRAVENOUS at 09:44

## 2022-09-08 RX ADMIN — ETOPOSIDE 160 MG: 20 INJECTION INTRAVENOUS at 10:07

## 2022-09-08 RX ADMIN — Medication 10 ML: at 11:29

## 2022-09-09 ENCOUNTER — INFUSION (OUTPATIENT)
Dept: ONCOLOGY | Facility: HOSPITAL | Age: 67
End: 2022-09-09

## 2022-09-09 ENCOUNTER — HOSPITAL ENCOUNTER (OUTPATIENT)
Dept: CT IMAGING | Facility: HOSPITAL | Age: 67
Discharge: HOME | End: 2022-09-09
Admitting: INTERNAL MEDICINE

## 2022-09-09 VITALS
DIASTOLIC BLOOD PRESSURE: 88 MMHG | TEMPERATURE: 96.8 F | HEART RATE: 87 BPM | OXYGEN SATURATION: 97 % | BODY MASS INDEX: 23.52 KG/M2 | RESPIRATION RATE: 18 BRPM | SYSTOLIC BLOOD PRESSURE: 174 MMHG | WEIGHT: 132.8 LBS

## 2022-09-09 DIAGNOSIS — C34.90 SMALL CELL LUNG CANCER: Primary | ICD-10-CM

## 2022-09-09 DIAGNOSIS — Z95.828 PORT-A-CATH IN PLACE: ICD-10-CM

## 2022-09-09 DIAGNOSIS — C34.90 SMALL CELL LUNG CANCER: ICD-10-CM

## 2022-09-09 PROCEDURE — 70460 CT HEAD/BRAIN W/DYE: CPT

## 2022-09-09 PROCEDURE — 25010000002 HEPARIN LOCK FLUSH PER 10 UNITS: Performed by: INTERNAL MEDICINE

## 2022-09-09 PROCEDURE — 96375 TX/PRO/DX INJ NEW DRUG ADDON: CPT

## 2022-09-09 PROCEDURE — 25010000002 DEXAMETHASONE SODIUM PHOSPHATE 20 MG/5ML SOLUTION: Performed by: INTERNAL MEDICINE

## 2022-09-09 PROCEDURE — 96377 APPLICATON ON-BODY INJECTOR: CPT

## 2022-09-09 PROCEDURE — 25010000002 IOPAMIDOL 61 % SOLUTION: Performed by: INTERNAL MEDICINE

## 2022-09-09 PROCEDURE — 96413 CHEMO IV INFUSION 1 HR: CPT

## 2022-09-09 PROCEDURE — 25010000002 PEGFILGRASTIM 6 MG/0.6ML PREFILLED SYRINGE KIT: Performed by: NURSE PRACTITIONER

## 2022-09-09 PROCEDURE — 25010000002 ETOPOSIDE 500 MG/25ML SOLUTION 25 ML VIAL: Performed by: INTERNAL MEDICINE

## 2022-09-09 PROCEDURE — 70460 CT HEAD/BRAIN W/DYE: CPT | Performed by: RADIOLOGY

## 2022-09-09 RX ORDER — HEPARIN SODIUM (PORCINE) LOCK FLUSH IV SOLN 100 UNIT/ML 100 UNIT/ML
500 SOLUTION INTRAVENOUS AS NEEDED
Status: DISCONTINUED | OUTPATIENT
Start: 2022-09-09 | End: 2022-09-09 | Stop reason: HOSPADM

## 2022-09-09 RX ORDER — SODIUM CHLORIDE 9 MG/ML
250 INJECTION, SOLUTION INTRAVENOUS ONCE
Status: COMPLETED | OUTPATIENT
Start: 2022-09-09 | End: 2022-09-09

## 2022-09-09 RX ORDER — SODIUM CHLORIDE 0.9 % (FLUSH) 0.9 %
10 SYRINGE (ML) INJECTION AS NEEDED
Status: CANCELLED | OUTPATIENT
Start: 2022-09-09

## 2022-09-09 RX ORDER — HEPARIN SODIUM (PORCINE) LOCK FLUSH IV SOLN 100 UNIT/ML 100 UNIT/ML
500 SOLUTION INTRAVENOUS AS NEEDED
Status: CANCELLED | OUTPATIENT
Start: 2022-09-09

## 2022-09-09 RX ORDER — SODIUM CHLORIDE 0.9 % (FLUSH) 0.9 %
10 SYRINGE (ML) INJECTION AS NEEDED
Status: DISCONTINUED | OUTPATIENT
Start: 2022-09-09 | End: 2022-09-09 | Stop reason: HOSPADM

## 2022-09-09 RX ADMIN — IOPAMIDOL 85 ML: 612 INJECTION, SOLUTION INTRAVENOUS at 12:11

## 2022-09-09 RX ADMIN — SODIUM CHLORIDE 250 ML: 9 INJECTION, SOLUTION INTRAVENOUS at 12:55

## 2022-09-09 RX ADMIN — DEXAMETHASONE SODIUM PHOSPHATE 12 MG: 4 INJECTION, SOLUTION INTRA-ARTICULAR; INTRALESIONAL; INTRAMUSCULAR; INTRAVENOUS; SOFT TISSUE at 12:56

## 2022-09-09 RX ADMIN — Medication 10 ML: at 14:29

## 2022-09-09 RX ADMIN — PEGFILGRASTIM 6 MG: KIT SUBCUTANEOUS at 14:24

## 2022-09-09 RX ADMIN — ETOPOSIDE 160 MG: 20 INJECTION INTRAVENOUS at 13:13

## 2022-09-09 RX ADMIN — Medication 500 UNITS: at 14:30

## 2022-09-13 DIAGNOSIS — C34.90 SMALL CELL LUNG CANCER: Primary | ICD-10-CM

## 2022-09-13 NOTE — PROGRESS NOTES
NAME: Serenity Villegas    : 1955    DATE:  2022     DIAGNOSIS:  Limited stage Small Cell Lung Cancer     CHIEF COMPLAINT:  Follow-up of lung cancer      TREATMENT HISTORY:  1.          HISTORY OF PRESENT ILLNESS:   Serenity Villegas is a very pleasant 67 y.o. female who is being seen today in the presence of her son at the request of Dr. Moy Murrieta for evaluation and treatment of newly diagnosed lung cancer.  Ms. Villegas is quite anxious today.  She says that s she initially began to feel poorly approximately 6 weeks ago.  She started to feel short of breath, initially at night, but then progressively worse during the day as well, and says she noticed a rattling sensation in her chest.  She denies any chest pain, but says that she noticed her activity began to be affected.  She would feel very short of breath with minimal exertion and started to have trouble picking up her 2-year-old grandson.  Her son took her to a new primary care physician who promptly ordered CT imaging which was abnormal.  This led to her seeing Dr. Murrieta, and she underwent bronchoscopy on 2022.  Biopsies and BAL from the RML as well as 12 R and 7 lymph node stations all revealed small cell lung cancer.  PET/CT done 2022 revealed a central masslike process involving the right hilum centered around the right middle lobe mainstem bronchus which was approximately 4.3 cm in dimension (SUV 7.8) as well as a right suprahilar lymph node which was 2.5 cm in diameter with mass-effect on the right mainstem bronchus.  There was also an 11 mm nodule in the right lower lobe which did not show FDG hypermetabolism.  There was no evidence of distant metastatic disease.  She was referred here for further evaluation and treatment.    Ms. Villegas notes that she has lost approximately 4 pounds over the last several weeks.  Her appetite has been decent, but she does notice that she has mild nausea over the last several weeks.  She  occasionally vomits, but not routinely.  She denies significant chest pain, but does occasionally get twinges of pain.  She complains of shortness of breath especially with activity and cough productive of clear sputum.  She denies abdominal pain.  She denies diarrhea or constipation.  She chronically gets headaches (maybe once to twice a month).  This is typical for her and she has a history of what sounds like classical migraine which has been going on for several years.  These headaches are not changed.  She denies neurologic symptoms.    INTERVAL HISTORY:  Ms. Villegas is here today for follow-up of limited stage small cell lung cancer.  Since her last visit, she took her first cycle of chemotherapy.  She said she did very well with this and denies any significant side effects.  She said she had some bony discomfort for about a half a day following growth factor treatment.  She said she transiently had decrease in her appetite but is now eating very well.  She says that her breathing seems to be doing a little better.  She is no longer having a rattling sensation in her chest.  She has no concerns or complaints today.      PAST MEDICAL HISTORY:  Past Medical History:   Diagnosis Date   • Arthritis    • Cancer (HCC)    • Coronary artery disease    • Heart disease    • Heartburn    • Hyperlipidemia    • Hypertension    • Right lower lobe lung mass        PAST SURGICAL HISTORY:  Past Surgical History:   Procedure Laterality Date   • BRONCHOSCOPY N/A 8/22/2022    Procedure: BRONCHOSCOPY WITH ENDOBRONCHIAL ULTRASOUND;  Surgeon: Amari Murrieta MD;  Location: [facility];  Service: Pulmonary;  Laterality: N/A;   • CARDIAC SURGERY      3 VESELS 2002   [facility]   • HYSTERECTOMY      [facility]   • PACEMAKER IMPLANTATION      [facility]   • VENOUS ACCESS DEVICE (PORT) INSERTION N/A 9/6/2022    Procedure: INSERTION VENOUS ACCESS DEVICE;  Surgeon: Ting Sarah MD;  Location: [facility];  Service: General;   "Laterality: N/A;   Hysterectomy performed for endometriosis (benign) \"a long time ago\"    FAMILY HISTORY:  Family History   Problem Relation Age of Onset   • Cancer Mother    • Heart disease Father    • Heart attack Father    Mother  with melanoma  Maternal grandmother  of unknown type malignancy    SOCIAL HISTORY:  Social History     Socioeconomic History   • Marital status:    Tobacco Use   • Smoking status: Current Every Day Smoker     Packs/day: 0.25     Years: 20.00     Pack years: 5.00   • Smokeless tobacco: Never Used   • Tobacco comment: 1  pack per day history   Vaping Use   • Vaping Use: Never used   Substance and Sexual Activity   • Alcohol use: Never   • Drug use: Never   • Sexual activity: Defer   Ms. Villegas lives alone.  Her son accompanies her to her visit today and is very supportive.  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  No unusual chemical exposures.  She is a smoker and reports she currently smokes about half pack per day      REVIEW OF SYSTEMS:   A comprehensive 14 point review of systems was performed.  Significant findings as mentioned above.  All other systems reviewed and are negative.      MEDICATIONS:  The current medication list was reviewed in the EMR    Current Outpatient Medications:   •  albuterol sulfate  (90 Base) MCG/ACT inhaler, , Disp: , Rfl:   •  allopurinol (ZYLOPRIM) 300 MG tablet, Take 1 tablet by mouth Daily., Disp: 30 tablet, Rfl: 3  •  Anoro Ellipta 62.5-25 MCG/INH aerosol powder  inhaler, , Disp: , Rfl:   •  atenolol (TENORMIN) 50 MG tablet, , Disp: , Rfl:   •  atorvastatin (LIPITOR) 40 MG tablet, , Disp: , Rfl:   •  benazepril (LOTENSIN) 20 MG tablet, , Disp: , Rfl:   •  Cyanocobalamin (Vitamin B-12) 500 MCG sublingual tablet, Place 500 mcg under the tongue Daily., Disp: 30 tablet, Rfl: 5  •  Diclofenac Sodium (VOLTAREN) 1 % gel gel, , Disp: , Rfl:   •  escitalopram (LEXAPRO) 10 MG tablet, Take 1 tablet by mouth Daily., Disp: 30 " "tablet, Rfl: 1  •  esomeprazole (nexIUM) 40 MG capsule, Take 1 capsule by mouth Every Morning Before Breakfast., Disp: 60 capsule, Rfl: 1  •  HYDROcodone-acetaminophen (Norco) 7.5-325 MG per tablet, Take 1 tablet by mouth 4 (Four) Times a Day As Needed for Moderate Pain., Disp: 8 tablet, Rfl: 0  •  levocetirizine (XYZAL) 5 MG tablet, , Disp: , Rfl:   •  lidocaine-prilocaine (EMLA) 2.5-2.5 % cream, Apply to Port-A-Cath site 30 minutes prior to arrival at infusion clinic., Disp: 30 g, Rfl: 1  •  loratadine (CLARITIN) 10 MG tablet, Take 1 tablet by mouth Daily., Disp: 30 tablet, Rfl: 1  •  montelukast (SINGULAIR) 10 MG tablet, , Disp: , Rfl:   •  OLANZapine (zyPREXA) 5 MG tablet, Take one tablet at HS on days 2, 3 and 4 after chemotherapy., Disp: 3 tablet, Rfl: 4  •  ondansetron (ZOFRAN) 8 MG tablet, Take 1 tablet by mouth Every 8 (Eight) Hours As Needed for Nausea or Vomiting., Disp: 30 tablet, Rfl: 1  •  prochlorperazine (COMPAZINE) 10 MG tablet, Take 1 tablet by mouth Every 6 (Six) Hours As Needed for Nausea or Vomiting., Disp: 60 tablet, Rfl: 1    ALLERGIES:  No Known Allergies    PHYSICAL EXAM:  Vitals:    09/14/22 0827   BP: 141/85   Pulse: 97   Resp: 18   Temp: 97 °F (36.1 °C)   SpO2: 98%   Weight: 57.2 kg (126 lb)   Height: 160 cm (63\")   PainSc: 0-No pain       ECOG score: 0     General:  Awake, alert and oriented.  Appears anxious, but in no distress  HEENT:  Pupils are equal, round and reactive to light and accommodation, Extra-ocular movements full, Oropharyx clear, mucous membranes moist  Neck:  No JVD, thyromegaly or lymphadenopathy  CV:  Regular rate and rhythm, no murmurs, rubs or gallops  Resp:  Lungs are clear to auscultation bilaterally, no wheezing.  Port-A-Cath in place right chest, C/D/I  abd:  Soft, non-tender, non-distended, bowel sounds present, no organomegaly or masses  Ext:  No clubbing, cyanosis or edema  Lymph:  No cervical, supraclavicular, axillary, inguinal or femoral adenopathy  Neuro: "  MS as above, CN II-XII intact, strength 5/5 IN BUE and BLE.  Light touch diffusely intact.  FTN intact bilaterally.  No pronator drift.      PATHOLOGY:    08/22/2022            ENDOSCOPY:    Bronchoscopy: Dr. Murrieta 8/22/2022  Operation: Flexible fiberoptic bronchoscopy     Findings: Tumor emanating from the right middle lobe     Specimen(s): Endobronchial biopsies right middle lobe.  BAL right middle lobe.  Cytology brush right middle lobe.  Transbronchial needle aspiration station 12 R and station 7     Estimated Blood Loss: Minimal/None     Complications: No immediate      IMAGING:    CT Chest with Contrast 08/10/22      NM PET/CT Skull Base to Mid Thigh (08/24/2022 11:30)  FINDINGS:      HEAD:    Brain:  Unremarkable as visualized.    Nasopharynx:  Unremarkable.      NECK:    Hypopharynx:  Unremarkable.    Larynx:  Unremarkable.    Trachea:  Unremarkable.    Retropharyngeal space:  Unremarkable.    Submandibular/parotid glands:  Unremarkable.  Glands are normal in  size.    Thyroid:  Unremarkable.  No enlarged or calcified nodules.      CHEST:    Lungs:  Central masslike process involving the right hilum centered  around the right middle lobe main stem bronchus that may represent a  sharath conglomeration or a primary mass and is 4.3 cm. FDG  hypermetabolism is noted with maximum SUV: 7.8.  11 mm nodule right  lower lobe shows no FDG hypermetabolism.  Emphysema is noted.    Pleural space:  Unremarkable.  No significant effusion.  No  pneumothorax.    Heart:  Cardiomegaly.  CABG.  No significant pericardial effusion.    Mediastinum:  Unremarkable.  No mass.      ABDOMEN:    Liver:  Unremarkable.    Gallbladder and bile ducts:  Unremarkable.  No calcified stones.  No  ductal dilation.    Pancreas:  Unremarkable.  No ductal dilation.    Spleen:  Unremarkable.  No splenomegaly.    Adrenals:  Unremarkable.  No mass.    Kidneys and ureters:  Atrophy right kidney.  Compensatory hypertrophy  left kidney.   Nonobstructing left kidney stones.    Stomach and bowel:  Sigmoid diverticulosis without evidence of acute  diverticulitis.  No obstruction.      PELVIS:    Appendix:  No findings to suggest acute appendicitis.    Bladder:  Unremarkable.    Reproductive:  Unremarkable as visualized.      WHOLE BODY:    Intraperitoneal space:  Unremarkable.  No significant fluid  collection.  No free air.    Bones/joints:  Unremarkable.  No acute fracture.  No dislocation.    Soft tissues:  Unremarkable.    Vasculature:  Advanced atherosclerosis of the aortoiliac vasculature.   No aortic aneurysm.    Lymph nodes:  Enlarged right suprahilar lymph node or primary mass is  noted that is approximately 2.5 cm and produces mass effect on the right  mainstem bronchus. FDG hypermetabolism is noted. Maximum SUV: 8.8.     IMPRESSION:  1.  Central masslike process involving the right hilum centered around  the right middle lobe main stem bronchus that may represent a sharath  conglomeration or a primary mass and is 4.3 cm. FDG hypermetabolism is  noted with maximum SUV: 7.8.  2.  Enlarged right suprahilar lymph node or primary mass is noted that  is approximately 2.5 cm and produces mass effect on the right mainstem  bronchus. FDG hypermetabolism is noted. Maximum SUV: 8.8.  3.  11 mm nodule right lower lobe shows no FDG hypermetabolism.  4. Other incidental and nonacute findings as above.    CT Head With Contrast (09/09/2022 12:10)  FINDINGS:   No acute intracranial abnormality. No midline shift or mass  effect. No hydrocephalus or intracranial hemorrhage. There is no CT  evidence of acute vascular territory infarct.  No pathologic contrast  enhancement identified. No enhancing extra-axial fluid collections. No  enhancing brain parenchymal lesions. Bone windows show no acute osseous  abnormality.     IMPRESSION:  1. No acute intracranial abnormality.  2. No pathologic contrast enhancement.        RECENT LABS:  Lab Results   Component Value Date     WBC 1.78 (C) 09/14/2022    HGB 12.0 09/14/2022    HCT 37.0 09/14/2022    MCV 95.9 09/14/2022    RDW 12.9 09/14/2022    PLT 68 (L) 09/14/2022    NEUTRORELPCT 62.7 09/07/2022    LYMPHORELPCT 22.6 09/07/2022    MONORELPCT 11.0 09/07/2022    EOSRELPCT 2.3 09/07/2022    BASORELPCT 1.1 09/07/2022    NEUTROABS 4.62 09/07/2022    LYMPHSABS 1.66 09/07/2022       Lab Results   Component Value Date     (L) 09/14/2022    K 4.8 09/14/2022    CO2 21.3 (L) 09/14/2022     09/14/2022    BUN 19 09/14/2022    CREATININE 0.90 09/14/2022    GLUCOSE 102 (H) 09/14/2022    CALCIUM 9.8 09/14/2022    ALKPHOS 62 09/14/2022    AST 19 09/14/2022    ALT 19 09/14/2022    BILITOT 0.4 09/14/2022    ALBUMIN 4.21 09/14/2022    PROTEINTOT 6.4 09/14/2022    MG 2.0 09/07/2022     Lab Results   Component Value Date    FERRITIN 126.80 08/26/2022    IRON 35 (L) 08/26/2022    TIBC 337 08/26/2022    LABIRON 10 (L) 08/26/2022    [id number] 211 08/26/2022    FOLATE 14.20 08/26/2022     Lab Results   Component Value Date    TSH 0.522 08/26/2022     Lab Results   Component Value Date     (H) 09/14/2022     (H) 08/26/2022         ASSESSMENT & PLAN:  Serenity Villegas is a very pleasant 67 y.o. female with newly diagnosed limited stage small cell lung cancer.    1.  Limited stage small cell lung cancer:  -   MRI of the brain was ordered, but she was unable to have this completed due to the presence of a pacemaker, so we ordered CT of the head which was negative.  - I have recommended concurrent chemotherapy and radiation with cisplatin and etoposide.  We discussed potential risks benefits and side effects today, and she decided to proceed.  - She did very well with her first cycle of chemotherapy, which was given without radiation to allow time for radiation planning.  - She met with Dr. Spain and had same and radiation planning is under way.  Hopefully will start radiation with cycle 2 which is currently scheduled for 9/20/2022.  -  Continue allopurinol 300 mg p.o. daily  - I will plan to see her back in about a month with CBC and CMP.    2.  Prophylaxis:  - She did not have 2021 influenza vaccine.  I recommended she get this years vaccine once it is available.  - Received Prevnar 20 vaccine today.  - She has not had COVID vaccinations.  I recommended that she get this completed as soon as possible.  Could also consider Evusheld treatment.    3.  ACO / BALJIT/Other  Quality measures  -  Serenity Villegas did not receive 2021 flu vaccine.  Recommended she get this years vaccine when it is available to her.  -  Serenity Villegas reports a pain score of 0.  Given her pain assessment as noted, treatment options were discussed and the following options were decided upon as a follow-up plan to address the patient's pain: No intervention needed at this time.  -  Current outpatient and discharge medications have been reconciled for the patient.  Reviewed by: Rhianna Wood MD      4.  Follow-up:  - Clinically doing well.  Plan to proceed with cycle 2 on 9/28/2022.  We will not use growth factor with that cycle given plan to start radiation.  - Continue allopurinol for 2 more weeks and then discontinue  - Return to see me in approximately 1 month with CBC and CMP    I spent 30 minutes with Serenity Villegas today.  In the office today, more than 50% of this time was spent face-to-face with her  in counseling / coordination of care, reviewing her medical history and counseling on the current treatment plan.  All questions were answered to her satisfaction      Electronically Signed by: Rhianna Wood MD      CC:     DO Amari Russell MD Weisi Yan, MD

## 2022-09-14 ENCOUNTER — OFFICE VISIT (OUTPATIENT)
Dept: ONCOLOGY | Facility: CLINIC | Age: 67
End: 2022-09-14

## 2022-09-14 ENCOUNTER — LAB (OUTPATIENT)
Dept: ONCOLOGY | Facility: CLINIC | Age: 67
End: 2022-09-14

## 2022-09-14 VITALS
HEIGHT: 63 IN | HEART RATE: 97 BPM | DIASTOLIC BLOOD PRESSURE: 85 MMHG | TEMPERATURE: 97 F | SYSTOLIC BLOOD PRESSURE: 141 MMHG | WEIGHT: 126 LBS | RESPIRATION RATE: 18 BRPM | OXYGEN SATURATION: 98 % | BODY MASS INDEX: 22.32 KG/M2

## 2022-09-14 DIAGNOSIS — C34.90 SMALL CELL LUNG CANCER: Primary | ICD-10-CM

## 2022-09-14 DIAGNOSIS — C34.90 SMALL CELL LUNG CANCER: ICD-10-CM

## 2022-09-14 LAB
ALBUMIN SERPL-MCNC: 4.21 G/DL (ref 3.5–5.2)
ALBUMIN/GLOB SERPL: 1.9 G/DL
ALP SERPL-CCNC: 62 U/L (ref 39–117)
ALT SERPL W P-5'-P-CCNC: 19 U/L (ref 1–33)
ANION GAP SERPL CALCULATED.3IONS-SCNC: 8.7 MMOL/L (ref 5–15)
AST SERPL-CCNC: 19 U/L (ref 1–32)
BASOPHILS # BLD MANUAL: 0.07 10*3/MM3 (ref 0–0.2)
BASOPHILS NFR BLD MANUAL: 4 % (ref 0–1.5)
BILIRUB SERPL-MCNC: 0.4 MG/DL (ref 0–1.2)
BUN SERPL-MCNC: 19 MG/DL (ref 8–23)
BUN/CREAT SERPL: 21.1 (ref 7–25)
CALCIUM SPEC-SCNC: 9.8 MG/DL (ref 8.6–10.5)
CHLORIDE SERPL-SCNC: 102 MMOL/L (ref 98–107)
CO2 SERPL-SCNC: 21.3 MMOL/L (ref 22–29)
CREAT SERPL-MCNC: 0.9 MG/DL (ref 0.57–1)
DACRYOCYTES BLD QL SMEAR: ABNORMAL
DEPRECATED RDW RBC AUTO: 45.5 FL (ref 37–54)
EGFRCR SERPLBLD CKD-EPI 2021: 71.1 ML/MIN/1.73
EOSINOPHIL # BLD MANUAL: 0.07 10*3/MM3 (ref 0–0.4)
EOSINOPHIL NFR BLD MANUAL: 4 % (ref 0.3–6.2)
ERYTHROCYTE [DISTWIDTH] IN BLOOD BY AUTOMATED COUNT: 12.9 % (ref 12.3–15.4)
GLOBULIN UR ELPH-MCNC: 2.2 GM/DL
GLUCOSE SERPL-MCNC: 102 MG/DL (ref 65–99)
HCT VFR BLD AUTO: 37 % (ref 34–46.6)
HGB BLD-MCNC: 12 G/DL (ref 12–15.9)
LDH SERPL-CCNC: 264 U/L (ref 135–214)
LYMPHOCYTES # BLD MANUAL: 1.14 10*3/MM3 (ref 0.7–3.1)
MCH RBC QN AUTO: 31.1 PG (ref 26.6–33)
MCHC RBC AUTO-ENTMCNC: 32.4 G/DL (ref 31.5–35.7)
MCV RBC AUTO: 95.9 FL (ref 79–97)
NEUTROPHILS # BLD AUTO: 0.5 10*3/MM3 (ref 1.7–7)
NEUTROPHILS NFR BLD MANUAL: 28 % (ref 42.7–76)
PLATELET # BLD AUTO: 68 10*3/MM3 (ref 140–450)
PMV BLD AUTO: 9.8 FL (ref 6–12)
POTASSIUM SERPL-SCNC: 4.8 MMOL/L (ref 3.5–5.2)
PROT SERPL-MCNC: 6.4 G/DL (ref 6–8.5)
RBC # BLD AUTO: 3.86 10*6/MM3 (ref 3.77–5.28)
SCAN SLIDE: NORMAL
SMALL PLATELETS BLD QL SMEAR: ABNORMAL
SODIUM SERPL-SCNC: 132 MMOL/L (ref 136–145)
URATE SERPL-MCNC: 2.1 MG/DL (ref 2.4–5.7)
VARIANT LYMPHS NFR BLD MANUAL: 64 % (ref 19.6–45.3)
WBC NRBC COR # BLD: 1.78 10*3/MM3 (ref 3.4–10.8)

## 2022-09-14 PROCEDURE — 84550 ASSAY OF BLOOD/URIC ACID: CPT | Performed by: INTERNAL MEDICINE

## 2022-09-14 PROCEDURE — 85007 BL SMEAR W/DIFF WBC COUNT: CPT | Performed by: INTERNAL MEDICINE

## 2022-09-14 PROCEDURE — 85025 COMPLETE CBC W/AUTO DIFF WBC: CPT | Performed by: INTERNAL MEDICINE

## 2022-09-14 PROCEDURE — 80053 COMPREHEN METABOLIC PANEL: CPT | Performed by: INTERNAL MEDICINE

## 2022-09-14 PROCEDURE — 83615 LACTATE (LD) (LDH) ENZYME: CPT | Performed by: INTERNAL MEDICINE

## 2022-09-14 PROCEDURE — 99214 OFFICE O/P EST MOD 30 MIN: CPT | Performed by: INTERNAL MEDICINE

## 2022-09-14 PROCEDURE — 36415 COLL VENOUS BLD VENIPUNCTURE: CPT | Performed by: INTERNAL MEDICINE

## 2022-09-19 PROCEDURE — 77300 RADIATION THERAPY DOSE PLAN: CPT | Performed by: RADIOLOGY

## 2022-09-19 PROCEDURE — 77338 DESIGN MLC DEVICE FOR IMRT: CPT | Performed by: RADIOLOGY

## 2022-09-19 PROCEDURE — 77293 RESPIRATOR MOTION MGMT SIMUL: CPT | Performed by: RADIOLOGY

## 2022-09-19 PROCEDURE — 77301 RADIOTHERAPY DOSE PLAN IMRT: CPT | Performed by: RADIOLOGY

## 2022-09-24 LAB
FUNGUS SPEC CULT: ABNORMAL
FUNGUS SPEC FUNGUS STN: ABNORMAL

## 2022-09-25 DIAGNOSIS — C34.90 SMALL CELL LUNG CANCER: Primary | ICD-10-CM

## 2022-09-25 RX ORDER — MAGNESIUM SULFATE HEPTAHYDRATE 40 MG/ML
2 INJECTION, SOLUTION INTRAVENOUS ONCE
Status: CANCELLED
Start: 2022-09-28 | End: 2022-09-28

## 2022-09-25 RX ORDER — PALONOSETRON 0.05 MG/ML
0.25 INJECTION, SOLUTION INTRAVENOUS ONCE
Status: CANCELLED | OUTPATIENT
Start: 2022-09-28

## 2022-09-25 RX ORDER — OLANZAPINE 5 MG/1
5 TABLET ORAL ONCE
Status: CANCELLED | OUTPATIENT
Start: 2022-09-28 | End: 2022-09-28

## 2022-09-25 RX ORDER — SODIUM CHLORIDE AND POTASSIUM CHLORIDE 150; 900 MG/100ML; MG/100ML
500 INJECTION, SOLUTION INTRAVENOUS CONTINUOUS
Status: CANCELLED
Start: 2022-09-28

## 2022-09-25 RX ORDER — SODIUM CHLORIDE 9 MG/ML
250 INJECTION, SOLUTION INTRAVENOUS ONCE
Status: CANCELLED | OUTPATIENT
Start: 2022-09-30

## 2022-09-25 RX ORDER — SODIUM CHLORIDE 9 MG/ML
250 INJECTION, SOLUTION INTRAVENOUS ONCE
Status: CANCELLED | OUTPATIENT
Start: 2022-09-28

## 2022-09-25 RX ORDER — SODIUM CHLORIDE 9 MG/ML
250 INJECTION, SOLUTION INTRAVENOUS ONCE
Status: CANCELLED | OUTPATIENT
Start: 2022-09-29

## 2022-09-26 PROCEDURE — 77014 CHG CT GUIDANCE RADIATION THERAPY FLDS PLACEMENT: CPT | Performed by: RADIOLOGY

## 2022-09-26 PROCEDURE — 77336 RADIATION PHYSICS CONSULT: CPT | Performed by: RADIOLOGY

## 2022-09-26 PROCEDURE — 77386: CPT | Performed by: RADIOLOGY

## 2022-09-26 PROCEDURE — 77427 RADIATION TX MANAGEMENT X5: CPT | Performed by: RADIOLOGY

## 2022-09-27 ENCOUNTER — RADIATION ONCOLOGY WEEKLY ASSESSMENT (OUTPATIENT)
Dept: RADIATION ONCOLOGY | Facility: HOSPITAL | Age: 67
End: 2022-09-27

## 2022-09-27 VITALS
OXYGEN SATURATION: 95 % | HEART RATE: 63 BPM | BODY MASS INDEX: 21.54 KG/M2 | WEIGHT: 121.6 LBS | RESPIRATION RATE: 18 BRPM | DIASTOLIC BLOOD PRESSURE: 65 MMHG | TEMPERATURE: 97.4 F | SYSTOLIC BLOOD PRESSURE: 104 MMHG

## 2022-09-27 DIAGNOSIS — C34.90 SMALL CELL LUNG CANCER: Primary | ICD-10-CM

## 2022-09-27 PROCEDURE — 77014 CHG CT GUIDANCE RADIATION THERAPY FLDS PLACEMENT: CPT | Performed by: RADIOLOGY

## 2022-09-27 PROCEDURE — 77386: CPT | Performed by: RADIOLOGY

## 2022-09-28 ENCOUNTER — APPOINTMENT (OUTPATIENT)
Dept: ONCOLOGY | Facility: HOSPITAL | Age: 67
End: 2022-09-28

## 2022-09-28 ENCOUNTER — INFUSION (OUTPATIENT)
Dept: ONCOLOGY | Facility: HOSPITAL | Age: 67
End: 2022-09-28

## 2022-09-28 VITALS
DIASTOLIC BLOOD PRESSURE: 60 MMHG | SYSTOLIC BLOOD PRESSURE: 101 MMHG | HEART RATE: 80 BPM | BODY MASS INDEX: 21.61 KG/M2 | TEMPERATURE: 96.8 F | OXYGEN SATURATION: 97 % | RESPIRATION RATE: 18 BRPM | WEIGHT: 122 LBS

## 2022-09-28 DIAGNOSIS — C34.90 SMALL CELL LUNG CANCER: Primary | ICD-10-CM

## 2022-09-28 DIAGNOSIS — Z95.828 PORT-A-CATH IN PLACE: ICD-10-CM

## 2022-09-28 LAB
ALBUMIN SERPL-MCNC: 3.74 G/DL (ref 3.5–5.2)
ALBUMIN/GLOB SERPL: 1.6 G/DL
ALP SERPL-CCNC: 71 U/L (ref 39–117)
ALT SERPL W P-5'-P-CCNC: 11 U/L (ref 1–33)
ANION GAP SERPL CALCULATED.3IONS-SCNC: 10.8 MMOL/L (ref 5–15)
AST SERPL-CCNC: 16 U/L (ref 1–32)
BASOPHILS # BLD AUTO: 0.05 10*3/MM3 (ref 0–0.2)
BASOPHILS NFR BLD AUTO: 0.4 % (ref 0–1.5)
BILIRUB SERPL-MCNC: 0.3 MG/DL (ref 0–1.2)
BUN SERPL-MCNC: 9 MG/DL (ref 8–23)
BUN/CREAT SERPL: 12.2 (ref 7–25)
CALCIUM SPEC-SCNC: 9 MG/DL (ref 8.6–10.5)
CHLORIDE SERPL-SCNC: 102 MMOL/L (ref 98–107)
CO2 SERPL-SCNC: 21.2 MMOL/L (ref 22–29)
CREAT SERPL-MCNC: 0.74 MG/DL (ref 0.57–1)
DEPRECATED RDW RBC AUTO: 45.1 FL (ref 37–54)
EGFRCR SERPLBLD CKD-EPI 2021: 89.9 ML/MIN/1.73
EOSINOPHIL # BLD AUTO: 0 10*3/MM3 (ref 0–0.4)
EOSINOPHIL NFR BLD AUTO: 0 % (ref 0.3–6.2)
ERYTHROCYTE [DISTWIDTH] IN BLOOD BY AUTOMATED COUNT: 13.7 % (ref 12.3–15.4)
GLOBULIN UR ELPH-MCNC: 2.4 GM/DL
GLUCOSE SERPL-MCNC: 115 MG/DL (ref 65–99)
HCT VFR BLD AUTO: 29.7 % (ref 34–46.6)
HGB BLD-MCNC: 9.9 G/DL (ref 12–15.9)
IMM GRANULOCYTES # BLD AUTO: 0.42 10*3/MM3 (ref 0–0.05)
IMM GRANULOCYTES NFR BLD AUTO: 3.7 % (ref 0–0.5)
LYMPHOCYTES # BLD AUTO: 1.14 10*3/MM3 (ref 0.7–3.1)
LYMPHOCYTES NFR BLD AUTO: 10 % (ref 19.6–45.3)
MAGNESIUM SERPL-MCNC: 2 MG/DL (ref 1.6–2.4)
MCH RBC QN AUTO: 31.3 PG (ref 26.6–33)
MCHC RBC AUTO-ENTMCNC: 33.3 G/DL (ref 31.5–35.7)
MCV RBC AUTO: 94 FL (ref 79–97)
MONOCYTES # BLD AUTO: 1.59 10*3/MM3 (ref 0.1–0.9)
MONOCYTES NFR BLD AUTO: 13.9 % (ref 5–12)
NEUTROPHILS NFR BLD AUTO: 72 % (ref 42.7–76)
NEUTROPHILS NFR BLD AUTO: 8.22 10*3/MM3 (ref 1.7–7)
NRBC BLD AUTO-RTO: 0.2 /100 WBC (ref 0–0.2)
PLATELET # BLD AUTO: 306 10*3/MM3 (ref 140–450)
PMV BLD AUTO: 9.3 FL (ref 6–12)
POTASSIUM SERPL-SCNC: 4.8 MMOL/L (ref 3.5–5.2)
PROT SERPL-MCNC: 6.1 G/DL (ref 6–8.5)
RBC # BLD AUTO: 3.16 10*6/MM3 (ref 3.77–5.28)
SODIUM SERPL-SCNC: 134 MMOL/L (ref 136–145)
WBC NRBC COR # BLD: 11.42 10*3/MM3 (ref 3.4–10.8)

## 2022-09-28 PROCEDURE — 25010000002 ETOPOSIDE 500 MG/25ML SOLUTION 25 ML VIAL: Performed by: INTERNAL MEDICINE

## 2022-09-28 PROCEDURE — 25010000002 SODIUM CHLORIDE 0.9 % WITH KCL 20 MEQ 20-0.9 MEQ/L-% SOLUTION: Performed by: INTERNAL MEDICINE

## 2022-09-28 PROCEDURE — 77386: CPT | Performed by: RADIOLOGY

## 2022-09-28 PROCEDURE — 83735 ASSAY OF MAGNESIUM: CPT

## 2022-09-28 PROCEDURE — 25010000002 FOSAPREPITANT PER 1 MG: Performed by: INTERNAL MEDICINE

## 2022-09-28 PROCEDURE — 96417 CHEMO IV INFUS EACH ADDL SEQ: CPT

## 2022-09-28 PROCEDURE — 96367 TX/PROPH/DG ADDL SEQ IV INF: CPT

## 2022-09-28 PROCEDURE — 96413 CHEMO IV INFUSION 1 HR: CPT

## 2022-09-28 PROCEDURE — 25010000002 PALONOSETRON 0.25 MG/5ML SOLUTION PREFILLED SYRINGE: Performed by: INTERNAL MEDICINE

## 2022-09-28 PROCEDURE — 25010000002 MAGNESIUM SULFATE 2 GM/50ML SOLUTION: Performed by: INTERNAL MEDICINE

## 2022-09-28 PROCEDURE — 25010000002 HEPARIN LOCK FLUSH PER 10 UNITS: Performed by: INTERNAL MEDICINE

## 2022-09-28 PROCEDURE — 25010000002 CISPLATIN PER 50 MG: Performed by: INTERNAL MEDICINE

## 2022-09-28 PROCEDURE — 96368 THER/DIAG CONCURRENT INF: CPT

## 2022-09-28 PROCEDURE — 96375 TX/PRO/DX INJ NEW DRUG ADDON: CPT

## 2022-09-28 PROCEDURE — 25010000002 DEXAMETHASONE SODIUM PHOSPHATE 20 MG/5ML SOLUTION: Performed by: INTERNAL MEDICINE

## 2022-09-28 PROCEDURE — 80053 COMPREHEN METABOLIC PANEL: CPT

## 2022-09-28 PROCEDURE — 85025 COMPLETE CBC W/AUTO DIFF WBC: CPT

## 2022-09-28 PROCEDURE — 77014 CHG CT GUIDANCE RADIATION THERAPY FLDS PLACEMENT: CPT | Performed by: RADIOLOGY

## 2022-09-28 PROCEDURE — 96415 CHEMO IV INFUSION ADDL HR: CPT

## 2022-09-28 RX ORDER — SODIUM CHLORIDE 9 MG/ML
250 INJECTION, SOLUTION INTRAVENOUS ONCE
Status: DISCONTINUED | OUTPATIENT
Start: 2022-09-28 | End: 2022-09-28 | Stop reason: HOSPADM

## 2022-09-28 RX ORDER — SODIUM CHLORIDE AND POTASSIUM CHLORIDE 150; 900 MG/100ML; MG/100ML
500 INJECTION, SOLUTION INTRAVENOUS CONTINUOUS
Status: DISPENSED | OUTPATIENT
Start: 2022-09-28 | End: 2022-09-28

## 2022-09-28 RX ORDER — SODIUM CHLORIDE 0.9 % (FLUSH) 0.9 %
10 SYRINGE (ML) INJECTION AS NEEDED
Status: CANCELLED | OUTPATIENT
Start: 2022-09-29

## 2022-09-28 RX ORDER — OLANZAPINE 5 MG/1
5 TABLET ORAL ONCE
Status: COMPLETED | OUTPATIENT
Start: 2022-09-28 | End: 2022-09-28

## 2022-09-28 RX ORDER — HEPARIN SODIUM (PORCINE) LOCK FLUSH IV SOLN 100 UNIT/ML 100 UNIT/ML
500 SOLUTION INTRAVENOUS AS NEEDED
Status: DISCONTINUED | OUTPATIENT
Start: 2022-09-28 | End: 2022-09-28 | Stop reason: HOSPADM

## 2022-09-28 RX ORDER — PALONOSETRON 0.05 MG/ML
0.25 INJECTION, SOLUTION INTRAVENOUS ONCE
Status: COMPLETED | OUTPATIENT
Start: 2022-09-28 | End: 2022-09-28

## 2022-09-28 RX ORDER — MAGNESIUM SULFATE HEPTAHYDRATE 40 MG/ML
2 INJECTION, SOLUTION INTRAVENOUS ONCE
Status: COMPLETED | OUTPATIENT
Start: 2022-09-28 | End: 2022-09-28

## 2022-09-28 RX ORDER — HEPARIN SODIUM (PORCINE) LOCK FLUSH IV SOLN 100 UNIT/ML 100 UNIT/ML
500 SOLUTION INTRAVENOUS AS NEEDED
Status: CANCELLED | OUTPATIENT
Start: 2022-09-29

## 2022-09-28 RX ORDER — SODIUM CHLORIDE 0.9 % (FLUSH) 0.9 %
10 SYRINGE (ML) INJECTION AS NEEDED
Status: DISCONTINUED | OUTPATIENT
Start: 2022-09-28 | End: 2022-09-28 | Stop reason: HOSPADM

## 2022-09-28 RX ADMIN — SODIUM CHLORIDE AND POTASSIUM CHLORIDE 500 ML/HR: .9; .15 SOLUTION INTRAVENOUS at 10:07

## 2022-09-28 RX ADMIN — ETOPOSIDE 160 MG: 20 INJECTION INTRAVENOUS at 13:15

## 2022-09-28 RX ADMIN — SODIUM CHLORIDE AND POTASSIUM CHLORIDE 500 ML/HR: .9; .15 SOLUTION INTRAVENOUS at 13:04

## 2022-09-28 RX ADMIN — Medication 10 ML: at 14:55

## 2022-09-28 RX ADMIN — SODIUM CHLORIDE, PRESERVATIVE FREE 500 UNITS: 5 INJECTION INTRAVENOUS at 14:55

## 2022-09-28 RX ADMIN — MAGNESIUM SULFATE HEPTAHYDRATE 2 G: 40 INJECTION, SOLUTION INTRAVENOUS at 10:58

## 2022-09-28 RX ADMIN — FOSAPREPITANT 150 MG: 150 INJECTION, POWDER, LYOPHILIZED, FOR SOLUTION INTRAVENOUS at 10:21

## 2022-09-28 RX ADMIN — PALONOSETRON 0.25 MG: 0.25 INJECTION, SOLUTION INTRAVENOUS at 10:08

## 2022-09-28 RX ADMIN — OLANZAPINE 5 MG: 5 TABLET, FILM COATED ORAL at 10:10

## 2022-09-28 RX ADMIN — DEXAMETHASONE SODIUM PHOSPHATE 12 MG: 4 INJECTION, SOLUTION INTRA-ARTICULAR; INTRALESIONAL; INTRAMUSCULAR; INTRAVENOUS; SOFT TISSUE at 10:09

## 2022-09-28 RX ADMIN — CISPLATIN 120 MG: 1 INJECTION INTRAVENOUS at 10:58

## 2022-09-29 ENCOUNTER — INFUSION (OUTPATIENT)
Dept: ONCOLOGY | Facility: HOSPITAL | Age: 67
End: 2022-09-29

## 2022-09-29 VITALS
DIASTOLIC BLOOD PRESSURE: 78 MMHG | TEMPERATURE: 96.9 F | WEIGHT: 128.4 LBS | RESPIRATION RATE: 18 BRPM | BODY MASS INDEX: 22.75 KG/M2 | OXYGEN SATURATION: 96 % | SYSTOLIC BLOOD PRESSURE: 145 MMHG | HEART RATE: 85 BPM

## 2022-09-29 DIAGNOSIS — Z95.828 PORT-A-CATH IN PLACE: ICD-10-CM

## 2022-09-29 DIAGNOSIS — R60.9 EDEMA, UNSPECIFIED TYPE: ICD-10-CM

## 2022-09-29 DIAGNOSIS — C34.90 SMALL CELL LUNG CANCER: Primary | ICD-10-CM

## 2022-09-29 PROCEDURE — 77014 CHG CT GUIDANCE RADIATION THERAPY FLDS PLACEMENT: CPT | Performed by: RADIOLOGY

## 2022-09-29 PROCEDURE — 25010000002 FUROSEMIDE PER 20 MG: Performed by: NURSE PRACTITIONER

## 2022-09-29 PROCEDURE — 25010000002 ETOPOSIDE 500 MG/25ML SOLUTION 25 ML VIAL: Performed by: INTERNAL MEDICINE

## 2022-09-29 PROCEDURE — 96375 TX/PRO/DX INJ NEW DRUG ADDON: CPT

## 2022-09-29 PROCEDURE — 25010000002 DEXAMETHASONE SODIUM PHOSPHATE 20 MG/5ML SOLUTION: Performed by: INTERNAL MEDICINE

## 2022-09-29 PROCEDURE — 77386: CPT | Performed by: RADIOLOGY

## 2022-09-29 PROCEDURE — 25010000002 HEPARIN LOCK FLUSH PER 10 UNITS: Performed by: INTERNAL MEDICINE

## 2022-09-29 PROCEDURE — 96413 CHEMO IV INFUSION 1 HR: CPT

## 2022-09-29 RX ORDER — FUROSEMIDE 10 MG/ML
20 INJECTION INTRAMUSCULAR; INTRAVENOUS ONCE
Status: COMPLETED | OUTPATIENT
Start: 2022-09-29 | End: 2022-09-29

## 2022-09-29 RX ORDER — HEPARIN SODIUM (PORCINE) LOCK FLUSH IV SOLN 100 UNIT/ML 100 UNIT/ML
500 SOLUTION INTRAVENOUS AS NEEDED
Status: DISCONTINUED | OUTPATIENT
Start: 2022-09-29 | End: 2022-09-29 | Stop reason: HOSPADM

## 2022-09-29 RX ORDER — SODIUM CHLORIDE 0.9 % (FLUSH) 0.9 %
10 SYRINGE (ML) INJECTION AS NEEDED
Status: DISCONTINUED | OUTPATIENT
Start: 2022-09-29 | End: 2022-09-29 | Stop reason: HOSPADM

## 2022-09-29 RX ORDER — SODIUM CHLORIDE 9 MG/ML
250 INJECTION, SOLUTION INTRAVENOUS ONCE
Status: COMPLETED | OUTPATIENT
Start: 2022-09-29 | End: 2022-09-29

## 2022-09-29 RX ORDER — HEPARIN SODIUM (PORCINE) LOCK FLUSH IV SOLN 100 UNIT/ML 100 UNIT/ML
500 SOLUTION INTRAVENOUS AS NEEDED
Status: CANCELLED | OUTPATIENT
Start: 2022-09-30

## 2022-09-29 RX ORDER — SODIUM CHLORIDE 0.9 % (FLUSH) 0.9 %
10 SYRINGE (ML) INJECTION AS NEEDED
Status: CANCELLED | OUTPATIENT
Start: 2022-09-30

## 2022-09-29 RX ADMIN — DEXAMETHASONE SODIUM PHOSPHATE 12 MG: 4 INJECTION, SOLUTION INTRA-ARTICULAR; INTRALESIONAL; INTRAMUSCULAR; INTRAVENOUS; SOFT TISSUE at 13:28

## 2022-09-29 RX ADMIN — Medication 500 UNITS: at 15:15

## 2022-09-29 RX ADMIN — Medication 10 ML: at 15:15

## 2022-09-29 RX ADMIN — ETOPOSIDE 160 MG: 20 INJECTION INTRAVENOUS at 13:48

## 2022-09-29 RX ADMIN — FUROSEMIDE 20 MG: 10 INJECTION, SOLUTION INTRAMUSCULAR; INTRAVENOUS at 13:25

## 2022-09-29 RX ADMIN — SODIUM CHLORIDE 250 ML: 9 INJECTION, SOLUTION INTRAVENOUS at 13:25

## 2022-09-30 ENCOUNTER — INFUSION (OUTPATIENT)
Dept: ONCOLOGY | Facility: HOSPITAL | Age: 67
End: 2022-09-30

## 2022-09-30 VITALS
DIASTOLIC BLOOD PRESSURE: 85 MMHG | WEIGHT: 126.4 LBS | OXYGEN SATURATION: 97 % | HEART RATE: 85 BPM | BODY MASS INDEX: 22.39 KG/M2 | RESPIRATION RATE: 18 BRPM | TEMPERATURE: 97.5 F | SYSTOLIC BLOOD PRESSURE: 167 MMHG

## 2022-09-30 DIAGNOSIS — C34.90 SMALL CELL LUNG CANCER: Primary | ICD-10-CM

## 2022-09-30 DIAGNOSIS — Z95.828 PORT-A-CATH IN PLACE: ICD-10-CM

## 2022-09-30 PROCEDURE — 96413 CHEMO IV INFUSION 1 HR: CPT

## 2022-09-30 PROCEDURE — 77386: CPT | Performed by: RADIOLOGY

## 2022-09-30 PROCEDURE — 25010000002 DEXAMETHASONE SODIUM PHOSPHATE 20 MG/5ML SOLUTION: Performed by: INTERNAL MEDICINE

## 2022-09-30 PROCEDURE — 96375 TX/PRO/DX INJ NEW DRUG ADDON: CPT

## 2022-09-30 PROCEDURE — 77014 CHG CT GUIDANCE RADIATION THERAPY FLDS PLACEMENT: CPT | Performed by: RADIOLOGY

## 2022-09-30 PROCEDURE — 25010000002 ETOPOSIDE 500 MG/25ML SOLUTION 25 ML VIAL: Performed by: INTERNAL MEDICINE

## 2022-09-30 PROCEDURE — 25010000002 HEPARIN LOCK FLUSH PER 10 UNITS: Performed by: INTERNAL MEDICINE

## 2022-09-30 RX ORDER — SODIUM CHLORIDE 0.9 % (FLUSH) 0.9 %
10 SYRINGE (ML) INJECTION AS NEEDED
Status: DISCONTINUED | OUTPATIENT
Start: 2022-09-30 | End: 2022-09-30 | Stop reason: HOSPADM

## 2022-09-30 RX ORDER — SODIUM CHLORIDE 0.9 % (FLUSH) 0.9 %
10 SYRINGE (ML) INJECTION AS NEEDED
Status: CANCELLED | OUTPATIENT
Start: 2022-09-30

## 2022-09-30 RX ORDER — SODIUM CHLORIDE 9 MG/ML
250 INJECTION, SOLUTION INTRAVENOUS ONCE
Status: COMPLETED | OUTPATIENT
Start: 2022-09-30 | End: 2022-09-30

## 2022-09-30 RX ORDER — HEPARIN SODIUM (PORCINE) LOCK FLUSH IV SOLN 100 UNIT/ML 100 UNIT/ML
500 SOLUTION INTRAVENOUS AS NEEDED
Status: DISCONTINUED | OUTPATIENT
Start: 2022-09-30 | End: 2022-09-30 | Stop reason: HOSPADM

## 2022-09-30 RX ORDER — HEPARIN SODIUM (PORCINE) LOCK FLUSH IV SOLN 100 UNIT/ML 100 UNIT/ML
500 SOLUTION INTRAVENOUS AS NEEDED
Status: CANCELLED | OUTPATIENT
Start: 2022-09-30

## 2022-09-30 RX ADMIN — SODIUM CHLORIDE 250 ML: 9 INJECTION, SOLUTION INTRAVENOUS at 12:08

## 2022-09-30 RX ADMIN — DEXAMETHASONE SODIUM PHOSPHATE 12 MG: 4 INJECTION, SOLUTION INTRA-ARTICULAR; INTRALESIONAL; INTRAMUSCULAR; INTRAVENOUS; SOFT TISSUE at 12:08

## 2022-09-30 RX ADMIN — ETOPOSIDE 160 MG: 20 INJECTION INTRAVENOUS at 12:35

## 2022-09-30 RX ADMIN — Medication 500 UNITS: at 14:04

## 2022-09-30 RX ADMIN — Medication 10 ML: at 14:04

## 2022-10-03 ENCOUNTER — APPOINTMENT (OUTPATIENT)
Dept: RADIATION ONCOLOGY | Facility: HOSPITAL | Age: 67
End: 2022-10-03

## 2022-10-03 LAB
RAD ONC ARIA COURSE ID: NORMAL
RAD ONC ARIA COURSE INTENT: NORMAL
RAD ONC ARIA COURSE LAST TREATMENT DATE: NORMAL
RAD ONC ARIA COURSE START DATE: NORMAL
RAD ONC ARIA COURSE TREATMENT ELAPSED DAYS: 7
RAD ONC ARIA FIRST TREATMENT DATE: NORMAL
RAD ONC ARIA PLAN FRACTIONS TREATED TO DATE: 6
RAD ONC ARIA PLAN ID: NORMAL
RAD ONC ARIA PLAN PRESCRIBED DOSE PER FRACTION: 2 GY
RAD ONC ARIA PLAN PRIMARY REFERENCE POINT: NORMAL
RAD ONC ARIA PLAN TOTAL FRACTIONS PRESCRIBED: 30
RAD ONC ARIA PLAN TOTAL PRESCRIBED DOSE: 6000 CGY
RAD ONC ARIA REFERENCE POINT DOSAGE GIVEN TO DATE: 12 GY
RAD ONC ARIA REFERENCE POINT ID: NORMAL
RAD ONC ARIA REFERENCE POINT SESSION DOSAGE GIVEN: 2 GY

## 2022-10-03 PROCEDURE — 77386: CPT | Performed by: RADIOLOGY

## 2022-10-03 PROCEDURE — 77014 CHG CT GUIDANCE RADIATION THERAPY FLDS PLACEMENT: CPT | Performed by: RADIOLOGY

## 2022-10-03 PROCEDURE — 77336 RADIATION PHYSICS CONSULT: CPT | Performed by: RADIOLOGY

## 2022-10-03 PROCEDURE — 77427 RADIATION TX MANAGEMENT X5: CPT | Performed by: RADIOLOGY

## 2022-10-04 ENCOUNTER — RADIATION ONCOLOGY WEEKLY ASSESSMENT (OUTPATIENT)
Dept: RADIATION ONCOLOGY | Facility: HOSPITAL | Age: 67
End: 2022-10-04

## 2022-10-04 VITALS
OXYGEN SATURATION: 98 % | DIASTOLIC BLOOD PRESSURE: 59 MMHG | RESPIRATION RATE: 18 BRPM | SYSTOLIC BLOOD PRESSURE: 86 MMHG | TEMPERATURE: 96.9 F | HEART RATE: 74 BPM

## 2022-10-04 DIAGNOSIS — C34.90 SMALL CELL LUNG CANCER: Primary | ICD-10-CM

## 2022-10-04 PROCEDURE — 77386: CPT | Performed by: RADIOLOGY

## 2022-10-04 PROCEDURE — 77014 CHG CT GUIDANCE RADIATION THERAPY FLDS PLACEMENT: CPT | Performed by: RADIOLOGY

## 2022-10-04 NOTE — PROGRESS NOTES
On Treatment Visit Note      Patient Name: Serenity Villegas  : 1955   MRN: [id number]     Diagnosis:  Lung Cancer   Staging: III T2N2M0    This patient was seen today for an on treatment visit.     Radiation Treatments     Active   Plans   [email or web address]_new   Most recent treatment: Dose planned: 200 cGy (fraction 7 on 10/4/2022)   Total: Dose planned: 6,000 cGy (30 fractions)   Elapsed Days: 8      Reference Points   [id number]   Most recent treatment: Dose given: 200 cGy (on 10/4/2022)   Total: Dose given: 1,400 cGy   Elapsed Days: 8         Historical   No historical radiation treatments to show.            Subjective      Review of Systems:   Review of Systems   HENT: Negative for sore throat and trouble swallowing.    Respiratory: Negative for cough and shortness of breath.    Cardiovascular: Negative for chest pain.   Skin: Negative for color change.   All other systems reviewed and are negative.      Medications:     Current Outpatient Medications:   •  albuterol sulfate  (90 Base) MCG/ACT inhaler, , Disp: , Rfl:   •  allopurinol (ZYLOPRIM) 300 MG tablet, Take 1 tablet by mouth Daily., Disp: 30 tablet, Rfl: 3  •  Anoro Ellipta 62.5-25 MCG/INH aerosol powder  inhaler, , Disp: , Rfl:   •  atenolol (TENORMIN) 50 MG tablet, , Disp: , Rfl:   •  atorvastatin (LIPITOR) 40 MG tablet, , Disp: , Rfl:   •  benazepril (LOTENSIN) 20 MG tablet, , Disp: , Rfl:   •  Cyanocobalamin (Vitamin B-12) 500 MCG sublingual tablet, Place 500 mcg under the tongue Daily., Disp: 30 tablet, Rfl: 5  •  Diclofenac Sodium (VOLTAREN) 1 % gel gel, , Disp: , Rfl:   •  escitalopram (LEXAPRO) 10 MG tablet, Take 1 tablet by mouth Daily., Disp: 30 tablet, Rfl: 1  •  esomeprazole (nexIUM) 40 MG capsule, Take 1 capsule by mouth Every Morning Before Breakfast., Disp: 60 capsule, Rfl: 1  •  HYDROcodone-acetaminophen (Norco) 7.5-325 MG per tablet, Take 1 tablet by mouth 4 (Four) Times a Day As Needed for Moderate Pain., Disp: 8 tablet, Rfl:  0  •  levocetirizine (XYZAL) 5 MG tablet, , Disp: , Rfl:   •  lidocaine-prilocaine (EMLA) 2.5-2.5 % cream, Apply to Port-A-Cath site 30 minutes prior to arrival at infusion clinic., Disp: 30 g, Rfl: 1  •  loratadine (CLARITIN) 10 MG tablet, Take 1 tablet by mouth Daily., Disp: 30 tablet, Rfl: 1  •  montelukast (SINGULAIR) 10 MG tablet, , Disp: , Rfl:   •  OLANZapine (zyPREXA) 5 MG tablet, Take one tablet at HS on days 2, 3 and 4 after chemotherapy., Disp: 3 tablet, Rfl: 4  •  ondansetron (ZOFRAN) 8 MG tablet, Take 1 tablet by mouth Every 8 (Eight) Hours As Needed for Nausea or Vomiting., Disp: 30 tablet, Rfl: 1  •  prochlorperazine (COMPAZINE) 10 MG tablet, Take 1 tablet by mouth Every 6 (Six) Hours As Needed for Nausea or Vomiting., Disp: 60 tablet, Rfl: 1    Allergies:   No Known Allergies  Objective     Physical Exam:  Physical Exam  Vitals reviewed.   Constitutional:       Appearance: Normal appearance.   Pulmonary:      Effort: Pulmonary effort is normal.   Skin:     General: Skin is warm and dry.   Neurological:      Mental Status: She is alert. Mental status is at baseline.   Psychiatric:         Mood and Affect: Mood normal.         Vital Signs:   Vitals:    10/04/22 1116   BP: (!) 86/59  Comment: MD aware, instructed to drink more fluids.   Pulse: 74   Resp: 18   Temp: 96.9 °F (36.1 °C)   TempSrc: Temporal   SpO2: 98%   PainSc: 0-No pain     There is no height or weight on file to calculate BMI.     Current Total XRT Dose (cGY):    Radiation Treatments     Active   Plans   RtLung_new   Most recent treatment: Dose planned: 200 cGy (fraction 7 on 10/4/2022)   Total: Dose planned: 6,000 cGy (30 fractions)   Elapsed Days: 8      Reference Points   [id number]   Most recent treatment: Dose given: 200 cGy (on 10/4/2022)   Total: Dose given: 1,400 cGy   Elapsed Days: 8         Historical   No historical radiation treatments to show.            Plan      Plan:   Patient was seen today for an on treatment visit.  Patient is receiving radiation therapy to the right lung. Patient is stable and tolerating radiation therapy well with minimal side effects.  No new issues today.  Patient states she feels good.  However, her blood pressure is low today.  She has not been drinking a lot of fluids because she has been very fatigued and slept most of the day yesterday.  She also took her prescribed blood pressure medicine this morning.  Instructed her to go home rest and drink lots of fluids today.  She does have a blood pressure cuff at home, so we instructed her to check her blood pressure at home, especially before taking her BP meds in the morning.  If blood pressure is low she can hold her morning BP meds.  She does not take any BP meds at night.  We will to recheck her again in the morning before her treatment to make sure this has resolved, if not we will give her a bolus of IV fluids.  Continue with radiation therapy.     I have reviewed treatment setup notes, checked and approved the daily guidance images. I reviewed dose delivery, treatment parameters and deemed them appropriate. We plan to continue radiation therapy as prescribed.     Digital speech recognition software was used to dictate parts of this note, some dictation errors may occur.    Electronically signed by Arsenio Spain MD, 10/04/22, 11:22 AM EDT.

## 2022-10-05 PROCEDURE — 77386: CPT | Performed by: RADIOLOGY

## 2022-10-05 PROCEDURE — 77014 CHG CT GUIDANCE RADIATION THERAPY FLDS PLACEMENT: CPT | Performed by: RADIOLOGY

## 2022-10-06 PROCEDURE — 77014 CHG CT GUIDANCE RADIATION THERAPY FLDS PLACEMENT: CPT | Performed by: RADIOLOGY

## 2022-10-06 PROCEDURE — 77386: CPT | Performed by: RADIOLOGY

## 2022-10-07 LAB
ACID FAST STN SPEC: NEGATIVE
MYCOBACTERIUM SPEC QL CULT: NEGATIVE
SPECIMEN PREPARATION: NORMAL

## 2022-10-07 PROCEDURE — 77014 CHG CT GUIDANCE RADIATION THERAPY FLDS PLACEMENT: CPT | Performed by: RADIOLOGY

## 2022-10-07 PROCEDURE — 77386: CPT | Performed by: RADIOLOGY

## 2022-10-10 PROCEDURE — 77386: CPT | Performed by: RADIOLOGY

## 2022-10-10 PROCEDURE — 77427 RADIATION TX MANAGEMENT X5: CPT | Performed by: RADIOLOGY

## 2022-10-10 PROCEDURE — 77336 RADIATION PHYSICS CONSULT: CPT | Performed by: RADIOLOGY

## 2022-10-10 PROCEDURE — 77014 CHG CT GUIDANCE RADIATION THERAPY FLDS PLACEMENT: CPT | Performed by: RADIOLOGY

## 2022-10-11 ENCOUNTER — TELEPHONE (OUTPATIENT)
Dept: ONCOLOGY | Facility: CLINIC | Age: 67
End: 2022-10-11

## 2022-10-11 ENCOUNTER — RADIATION ONCOLOGY WEEKLY ASSESSMENT (OUTPATIENT)
Dept: RADIATION ONCOLOGY | Facility: HOSPITAL | Age: 67
End: 2022-10-11

## 2022-10-11 ENCOUNTER — INFUSION (OUTPATIENT)
Dept: ONCOLOGY | Facility: HOSPITAL | Age: 67
End: 2022-10-11

## 2022-10-11 VITALS
RESPIRATION RATE: 18 BRPM | SYSTOLIC BLOOD PRESSURE: 139 MMHG | TEMPERATURE: 97.3 F | BODY MASS INDEX: 21.47 KG/M2 | OXYGEN SATURATION: 98 % | DIASTOLIC BLOOD PRESSURE: 82 MMHG | HEART RATE: 88 BPM | WEIGHT: 121.2 LBS

## 2022-10-11 VITALS — HEART RATE: 75 BPM | RESPIRATION RATE: 17 BRPM | DIASTOLIC BLOOD PRESSURE: 72 MMHG | SYSTOLIC BLOOD PRESSURE: 164 MMHG

## 2022-10-11 DIAGNOSIS — C34.90 SMALL CELL LUNG CANCER: Primary | ICD-10-CM

## 2022-10-11 DIAGNOSIS — Z95.828 PORT-A-CATH IN PLACE: Primary | ICD-10-CM

## 2022-10-11 PROCEDURE — 77014 CHG CT GUIDANCE RADIATION THERAPY FLDS PLACEMENT: CPT | Performed by: RADIOLOGY

## 2022-10-11 PROCEDURE — 77386: CPT | Performed by: RADIOLOGY

## 2022-10-11 PROCEDURE — 96361 HYDRATE IV INFUSION ADD-ON: CPT

## 2022-10-11 PROCEDURE — 25010000002 HEPARIN LOCK FLUSH PER 10 UNITS: Performed by: INTERNAL MEDICINE

## 2022-10-11 PROCEDURE — 96360 HYDRATION IV INFUSION INIT: CPT

## 2022-10-11 RX ORDER — HEPARIN SODIUM (PORCINE) LOCK FLUSH IV SOLN 100 UNIT/ML 100 UNIT/ML
500 SOLUTION INTRAVENOUS AS NEEDED
Status: DISCONTINUED | OUTPATIENT
Start: 2022-10-11 | End: 2022-10-11 | Stop reason: HOSPADM

## 2022-10-11 RX ORDER — SODIUM CHLORIDE 0.9 % (FLUSH) 0.9 %
10 SYRINGE (ML) INJECTION AS NEEDED
Status: CANCELLED | OUTPATIENT
Start: 2022-10-19

## 2022-10-11 RX ORDER — DEXTROSE MONOHYDRATE 50 MG/ML
1000 INJECTION, SOLUTION INTRAVENOUS ONCE
Status: COMPLETED | OUTPATIENT
Start: 2022-10-11 | End: 2022-10-11

## 2022-10-11 RX ORDER — DRONABINOL 2.5 MG/1
2.5 CAPSULE ORAL
Qty: 60 CAPSULE | Refills: 0 | Status: SHIPPED | OUTPATIENT
Start: 2022-10-11 | End: 2022-11-09 | Stop reason: SDUPTHER

## 2022-10-11 RX ORDER — HEPARIN SODIUM (PORCINE) LOCK FLUSH IV SOLN 100 UNIT/ML 100 UNIT/ML
500 SOLUTION INTRAVENOUS AS NEEDED
Status: CANCELLED | OUTPATIENT
Start: 2022-10-19

## 2022-10-11 RX ORDER — DEXTROSE MONOHYDRATE 50 MG/ML
100 INJECTION, SOLUTION INTRAVENOUS CONTINUOUS
Status: DISCONTINUED | OUTPATIENT
Start: 2022-10-11 | End: 2022-10-11

## 2022-10-11 RX ORDER — SODIUM CHLORIDE 0.9 % (FLUSH) 0.9 %
10 SYRINGE (ML) INJECTION AS NEEDED
Status: DISCONTINUED | OUTPATIENT
Start: 2022-10-11 | End: 2022-10-11 | Stop reason: HOSPADM

## 2022-10-11 RX ADMIN — DEXTROSE MONOHYDRATE 1000 ML: 50 INJECTION, SOLUTION INTRAVENOUS at 12:10

## 2022-10-11 RX ADMIN — Medication 10 ML: at 14:29

## 2022-10-11 RX ADMIN — Medication 500 UNITS: at 14:29

## 2022-10-11 NOTE — TELEPHONE ENCOUNTER
Caller: MEGHAN     Relationship to patient: SON    Best call back number: [phone]    Patient is needing: TO KNOW IF SHE CAN GET MARINOL TO EASE PAIN AND GIVE HER A LITTLE BIT OF AN APPETITE. SHE IS HAVING TROUBLE EATING AND DRINKING PROTEIN SHAKES.

## 2022-10-11 NOTE — PROGRESS NOTES
On Treatment Visit Note      Patient Name: Serenity Villegas  : 1955   MRN: [id number]     Diagnosis:  Lung Cancer   Staging: III [id number]    This patient was seen today for an on treatment visit.     Radiation Treatments     Active   Plans   RtLung_new   Most recent treatment: Dose planned: 200 cGy (fraction 12 on 10/11/2022)   Total: Dose planned: 6,000 cGy (30 fractions)   Elapsed Days: 15      Reference Points   [id number]   Most recent treatment: Dose given: 200 cGy (on 10/11/2022)   Total: Dose given: 2,400 cGy   Elapsed Days: 15         Historical   No historical radiation treatments to show.            Subjective      Review of Systems:   Review of Systems   Constitutional: Negative for appetite change.   HENT: Positive for sore throat and trouble swallowing.    Respiratory: Negative for cough and shortness of breath.    Cardiovascular: Negative for chest pain.   All other systems reviewed and are negative.      Medications:     Current Outpatient Medications:   •  albuterol sulfate  (90 Base) MCG/ACT inhaler, , Disp: , Rfl:   •  allopurinol (ZYLOPRIM) 300 MG tablet, Take 1 tablet by mouth Daily., Disp: 30 tablet, Rfl: 3  •  Anoro Ellipta 62.5-25 MCG/INH aerosol powder  inhaler, , Disp: , Rfl:   •  atenolol (TENORMIN) 50 MG tablet, , Disp: , Rfl:   •  atorvastatin (LIPITOR) 40 MG tablet, , Disp: , Rfl:   •  benazepril (LOTENSIN) 20 MG tablet, , Disp: , Rfl:   •  Cyanocobalamin (Vitamin B-12) 500 MCG sublingual tablet, Place 500 mcg under the tongue Daily., Disp: 30 tablet, Rfl: 5  •  Diclofenac Sodium (VOLTAREN) 1 % gel gel, , Disp: , Rfl:   •  escitalopram (LEXAPRO) 10 MG tablet, Take 1 tablet by mouth Daily., Disp: 30 tablet, Rfl: 1  •  esomeprazole (nexIUM) 40 MG capsule, Take 1 capsule by mouth Every Morning Before Breakfast., Disp: 60 capsule, Rfl: 1  •  HYDROcodone-acetaminophen (Norco) 7.5-325 MG per tablet, Take 1 tablet by mouth 4 (Four) Times a Day As Needed for Moderate Pain.,  Disp: 8 tablet, Rfl: 0  •  levocetirizine (XYZAL) 5 MG tablet, , Disp: , Rfl:   •  lidocaine-prilocaine (EMLA) 2.5-2.5 % cream, Apply to Port-A-Cath site 30 minutes prior to arrival at infusion clinic., Disp: 30 g, Rfl: 1  •  loratadine (CLARITIN) 10 MG tablet, Take 1 tablet by mouth Daily., Disp: 30 tablet, Rfl: 1  •  magic mouthwash oral suspension, Swish and swallow 10 mL Every 4 (Four) Hours As Needed for Mucositis. Benadryl, maalox, lidocaine (60 mL each), Disp: 1 each, Rfl: 3  •  montelukast (SINGULAIR) 10 MG tablet, , Disp: , Rfl:   •  OLANZapine (zyPREXA) 5 MG tablet, Take one tablet at HS on days 2, 3 and 4 after chemotherapy., Disp: 3 tablet, Rfl: 4  •  ondansetron (ZOFRAN) 8 MG tablet, Take 1 tablet by mouth Every 8 (Eight) Hours As Needed for Nausea or Vomiting., Disp: 30 tablet, Rfl: 1  •  prochlorperazine (COMPAZINE) 10 MG tablet, Take 1 tablet by mouth Every 6 (Six) Hours As Needed for Nausea or Vomiting., Disp: 60 tablet, Rfl: 1    Current Facility-Administered Medications:   •  dextrose (D5W) 5 % infusion, 100 mL/hr, Intravenous, Continuous, Arsenio Spain MD    Allergies:   No Known Allergies  Objective     Physical Exam:  Physical Exam  Vitals reviewed.   Constitutional:       Appearance: Normal appearance.   Pulmonary:      Effort: Pulmonary effort is normal.   Skin:     General: Skin is warm and dry.   Neurological:      Mental Status: She is alert. Mental status is at baseline.   Psychiatric:         Mood and Affect: Mood normal.         Vital Signs:   Vitals:    10/11/22 1124   BP: 139/82   Pulse: 88   Resp: 18   Temp: 97.3 °F (36.3 °C)   TempSrc: Temporal   SpO2: 98%   Weight: 55 kg (121 lb 3.2 oz)   PainSc: 0-No pain     Body mass index is 21.47 kg/m².     Current Total XRT Dose (cGY):    Radiation Treatments     Active   Plans   RtLung_new   Most recent treatment: Dose planned: 200 cGy (fraction 12 on 10/11/2022)   Total: Dose planned: 6,000 cGy (30 fractions)   Elapsed Days: 15      Reference  Points   [id number]   Most recent treatment: Dose given: 200 cGy (on 10/11/2022)   Total: Dose given: 2,400 cGy   Elapsed Days: 15         Historical   No historical radiation treatments to show.            Plan      Plan:   Patient was seen today for an on treatment visit. Patient is receiving radiation therapy to the lung. Patient is stable and tolerating radiation therapy well with minimal side effects.  Patient is having some trouble swallowing/sore throat today.  MMW Rx given today.  She is also not been eating and hydrating well over the past week.  IV hydration provided today.  Continue with radiation therapy.     I have reviewed treatment setup notes, checked and approved the daily guidance images. I reviewed dose delivery, treatment parameters and deemed them appropriate. We plan to continue radiation therapy as prescribed.     Digital speech recognition software was used to dictate parts of this note, some dictation errors may occur.    Electronically signed by Arsenio Spain MD, 10/11/22, 11:57 AM EDT.

## 2022-10-12 PROCEDURE — 77014 CHG CT GUIDANCE RADIATION THERAPY FLDS PLACEMENT: CPT | Performed by: RADIOLOGY

## 2022-10-12 PROCEDURE — 77386: CPT | Performed by: RADIOLOGY

## 2022-10-13 PROCEDURE — 77386: CPT | Performed by: RADIOLOGY

## 2022-10-13 PROCEDURE — 77014 CHG CT GUIDANCE RADIATION THERAPY FLDS PLACEMENT: CPT | Performed by: RADIOLOGY

## 2022-10-14 PROCEDURE — 77386: CPT | Performed by: RADIOLOGY

## 2022-10-14 PROCEDURE — 77014 CHG CT GUIDANCE RADIATION THERAPY FLDS PLACEMENT: CPT | Performed by: RADIOLOGY

## 2022-10-16 DIAGNOSIS — C34.90 SMALL CELL LUNG CANCER: Primary | ICD-10-CM

## 2022-10-16 RX ORDER — SODIUM CHLORIDE 9 MG/ML
250 INJECTION, SOLUTION INTRAVENOUS ONCE
Status: CANCELLED | OUTPATIENT
Start: 2022-10-28

## 2022-10-16 RX ORDER — SODIUM CHLORIDE AND POTASSIUM CHLORIDE 150; 900 MG/100ML; MG/100ML
500 INJECTION, SOLUTION INTRAVENOUS CONTINUOUS
Status: CANCELLED
Start: 2022-10-26 | End: 2022-10-26

## 2022-10-16 RX ORDER — SODIUM CHLORIDE 9 MG/ML
250 INJECTION, SOLUTION INTRAVENOUS ONCE
Status: CANCELLED | OUTPATIENT
Start: 2022-10-27

## 2022-10-16 RX ORDER — PALONOSETRON 0.05 MG/ML
0.25 INJECTION, SOLUTION INTRAVENOUS ONCE
Status: CANCELLED | OUTPATIENT
Start: 2022-10-26

## 2022-10-16 RX ORDER — SODIUM CHLORIDE 9 MG/ML
250 INJECTION, SOLUTION INTRAVENOUS ONCE
Status: CANCELLED | OUTPATIENT
Start: 2022-10-26

## 2022-10-16 RX ORDER — OLANZAPINE 5 MG/1
5 TABLET ORAL ONCE
Status: CANCELLED | OUTPATIENT
Start: 2022-10-26 | End: 2022-10-19

## 2022-10-16 RX ORDER — MAGNESIUM SULFATE HEPTAHYDRATE 40 MG/ML
2 INJECTION, SOLUTION INTRAVENOUS ONCE
Status: CANCELLED
Start: 2022-10-26 | End: 2022-10-19

## 2022-10-17 PROCEDURE — 77386: CPT | Performed by: RADIOLOGY

## 2022-10-17 PROCEDURE — 77336 RADIATION PHYSICS CONSULT: CPT | Performed by: RADIOLOGY

## 2022-10-17 PROCEDURE — 77014 CHG CT GUIDANCE RADIATION THERAPY FLDS PLACEMENT: CPT | Performed by: RADIOLOGY

## 2022-10-18 ENCOUNTER — RADIATION ONCOLOGY WEEKLY ASSESSMENT (OUTPATIENT)
Dept: RADIATION ONCOLOGY | Facility: HOSPITAL | Age: 67
End: 2022-10-18

## 2022-10-18 VITALS
HEART RATE: 87 BPM | TEMPERATURE: 97.3 F | RESPIRATION RATE: 18 BRPM | DIASTOLIC BLOOD PRESSURE: 77 MMHG | OXYGEN SATURATION: 98 % | WEIGHT: 112.4 LBS | SYSTOLIC BLOOD PRESSURE: 138 MMHG | BODY MASS INDEX: 19.91 KG/M2

## 2022-10-18 DIAGNOSIS — R53.83 FATIGUE, UNSPECIFIED TYPE: ICD-10-CM

## 2022-10-18 DIAGNOSIS — C34.90 SMALL CELL LUNG CANCER: Primary | ICD-10-CM

## 2022-10-18 PROCEDURE — 77386: CPT | Performed by: RADIOLOGY

## 2022-10-18 PROCEDURE — 77427 RADIATION TX MANAGEMENT X5: CPT | Performed by: RADIOLOGY

## 2022-10-18 PROCEDURE — 77014 CHG CT GUIDANCE RADIATION THERAPY FLDS PLACEMENT: CPT | Performed by: RADIOLOGY

## 2022-10-18 NOTE — PROGRESS NOTES
NAME: Serenity Villegas    : 1955    DATE:  10/19/2022     DIAGNOSIS:  Limited Stage Small Cell Lung Cancer     CHIEF COMPLAINT:  Follow up lung cancer    TREATMENT:  1.        2. Radiation on Right Lung with Dr. Spain  Radiation Treatments      Active   Plans   RtLung_new   Most recent treatment: Dose planned: 200 cGy (fraction 12 on 10/11/2022)   Total: Dose planned: 6,000 cGy (30 fractions)   Elapsed Days: 15      Reference Points   [id number]   Most recent treatment: Dose given: 200 cGy (on 10/11/2022)   Total: Dose given: 2,400 cGy   Elapsed Days: 15                  HISTORY OF PRESENT ILLNESS:   Serenity Villegas is a very pleasant 67 y.o. female who is being seen today in the presence of her son at the request of Dr. Moy Murrieta for evaluation and treatment of newly diagnosed lung cancer.  Ms. Villegas is quite anxious today.  She says that s she initially began to feel poorly approximately 6 weeks ago.  She started to feel short of breath, initially at night, but then progressively worse during the day as well, and says she noticed a rattling sensation in her chest.  She denies any chest pain, but says that she noticed her activity began to be affected.  She would feel very short of breath with minimal exertion and started to have trouble picking up her 2-year-old grandson.  Her son took her to a new primary care physician who promptly ordered CT imaging which was abnormal.  This led to her seeing Dr. Murrieta, and she underwent bronchoscopy on 2022.  Biopsies and BAL from the [facility] as well as 12 R and 7 lymph node stations all revealed small cell lung cancer.  PET/CT done 2022 revealed a central masslike process involving the right hilum centered around the right middle lobe mainstem bronchus which was approximately 4.3 cm in dimension (SUV 7.8) as well as a right suprahilar lymph node which was 2.5 cm in diameter with mass-effect on the right mainstem bronchus.  There was also an 11 mm nodule  in the right lower lobe which did not show FDG hypermetabolism.  There was no evidence of distant metastatic disease.  She was referred here for further evaluation and treatment.    Ms. Villegas notes that she has lost approximately 4 pounds over the last several weeks.  Her appetite has been decent, but she does notice that she has mild nausea over the last several weeks.  She occasionally vomits, but not routinely.  She denies significant chest pain, but does occasionally get twinges of pain.  She complains of shortness of breath especially with activity and cough productive of clear sputum.  She denies abdominal pain.  She denies diarrhea or constipation.  She chronically gets headaches (maybe once to twice a month).  This is typical for her and she has a history of what sounds like classical migraine which has been going on for several years.  These headaches are not changed.  She denies neurologic symptoms.    INTERVAL HISTORY:  Ms. Villegas is here today with her son for follow-up of small cell lung cancer.  As she is progressed with her treatment and there are radiating near her esophagus, she has developed a lot of pain in this area that makes it difficult for her to eat or drink.  Her son says that she will some days go 2 days without eating.  She says she tries, but the food really hurts going down.  She also does not have much of an appetite.  She took the Marinol twice and said she felt well on it, but has not taken it again.  She says she does not like to take medications.      PAST MEDICAL HISTORY:  Past Medical History:   Diagnosis Date   • Arthritis    • Cancer (HCC)    • Coronary artery disease    • Heart disease    • Heartburn    • Hyperlipidemia    • Hypertension    • Right lower lobe lung mass        PAST SURGICAL HISTORY:  Past Surgical History:   Procedure Laterality Date   • BRONCHOSCOPY N/A 8/22/2022    Procedure: BRONCHOSCOPY WITH ENDOBRONCHIAL ULTRASOUND;  Surgeon: Amari Murrieta MD;  " Location:  [facility] OR;  Service: Pulmonary;  Laterality: N/A;   • CARDIAC SURGERY      3 2002   [facility]   • HYSTERECTOMY      arh   • PACEMAKER IMPLANTATION      [facility]   • VENOUS ACCESS DEVICE (PORT) INSERTION N/A 2022    Procedure: INSERTION VENOUS ACCESS DEVICE;  Surgeon: iTng Sarah MD;  Location:  [facility] OR;  Service: General;  Laterality: N/A;   Hysterectomy performed for endometriosis (benign) \"a long time ago\"    FAMILY HISTORY:  Family History   Problem Relation Age of Onset   • Cancer Mother    • Heart disease Father    • Heart attack Father    Mother  with melanoma  Maternal grandmother  of unknown type malignancy    SOCIAL HISTORY:  Social History     Socioeconomic History   • Marital status:    Tobacco Use   • Smoking status: Every Day     Packs/day: 0.25     Years: 20.00     Pack years: 5.00     Types: Cigarettes   • Smokeless tobacco: Never   • Tobacco comments:     1  pack per day history   Vaping Use   • Vaping Use: Never used   Substance and Sexual Activity   • Alcohol use: Never   • Drug use: Never   • Sexual activity: Defer   Ms. Villegas lives alone.  Her son accompanies her to her visit today and is very supportive.  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  No unusual chemical exposures.  She is a smoker and reports she currently smokes about half pack per day      REVIEW OF SYSTEMS:   A comprehensive 14 point review of systems was performed.  Significant findings as mentioned above.  All other systems reviewed and are negative.      MEDICATIONS:  The current medication list was reviewed in the EMR    Current Outpatient Medications:   •  albuterol sulfate  (90 Base) MCG/ACT inhaler, , Disp: , Rfl:   •  allopurinol (ZYLOPRIM) 300 MG tablet, Take 1 tablet by mouth Daily., Disp: 30 tablet, Rfl: 3  •  Anoro Ellipta 62.5-25 MCG/INH aerosol powder  inhaler, , Disp: , Rfl:   •  atenolol (TENORMIN) 50 MG tablet, , Disp: , Rfl:   •  " "atorvastatin (LIPITOR) 40 MG tablet, , Disp: , Rfl:   •  benazepril (LOTENSIN) 20 MG tablet, , Disp: , Rfl:   •  Cyanocobalamin (Vitamin B-12) 500 MCG sublingual tablet, Place 500 mcg under the tongue Daily., Disp: 30 tablet, Rfl: 5  •  Diclofenac Sodium (VOLTAREN) 1 % gel gel, , Disp: , Rfl:   •  dronabinol (Marinol) 2.5 MG capsule, Take 1 capsule by mouth 2 (Two) Times a Day Before Meals., Disp: 60 capsule, Rfl: 0  •  escitalopram (LEXAPRO) 10 MG tablet, Take 1 tablet by mouth Daily., Disp: 30 tablet, Rfl: 1  •  esomeprazole (nexIUM) 40 MG capsule, Take 1 capsule by mouth Every Morning Before Breakfast., Disp: 60 capsule, Rfl: 1  •  HYDROcodone-acetaminophen (Norco) 7.5-325 MG per tablet, Take 1 tablet by mouth 4 (Four) Times a Day As Needed for Moderate Pain., Disp: 8 tablet, Rfl: 0  •  levocetirizine (XYZAL) 5 MG tablet, , Disp: , Rfl:   •  lidocaine-prilocaine (EMLA) 2.5-2.5 % cream, Apply to Port-A-Cath site 30 minutes prior to arrival at infusion clinic., Disp: 30 g, Rfl: 1  •  loratadine (CLARITIN) 10 MG tablet, Take 1 tablet by mouth Daily., Disp: 30 tablet, Rfl: 1  •  magic mouthwash oral suspension, Swish and swallow 10 mL Every 4 (Four) Hours As Needed for Mucositis., Disp: 1 each, Rfl: 3  •  montelukast (SINGULAIR) 10 MG tablet, , Disp: , Rfl:   •  OLANZapine (zyPREXA) 5 MG tablet, Take one tablet at HS on days 2, 3 and 4 after chemotherapy., Disp: 3 tablet, Rfl: 4  •  ondansetron (ZOFRAN) 8 MG tablet, Take 1 tablet by mouth Every 8 (Eight) Hours As Needed for Nausea or Vomiting., Disp: 30 tablet, Rfl: 1  •  prochlorperazine (COMPAZINE) 10 MG tablet, Take 1 tablet by mouth Every 6 (Six) Hours As Needed for Nausea or Vomiting., Disp: 60 tablet, Rfl: 1    ALLERGIES:  No Known Allergies    PHYSICAL EXAM:  Vitals:    10/19/22 0833   BP: 112/73   Pulse: 89   Resp: 18   Temp: 97.1 °F (36.2 °C)   TempSrc: Temporal   SpO2: 98%   Weight: 51.9 kg (114 lb 6.4 oz)   Height: 162.6 cm (64\")   PainSc: 0-No pain "       ECOG score: 0     General:  Awake, alert and oriented.  Appears anxious, but in no distress  HEENT:  Pupils are equal, round and reactive to light and accommodation, Extra-ocular movements full, Oropharyx clear, mucous membranes dry  Neck:  No JVD, thyromegaly or lymphadenopathy  CV:  Regular rate and rhythm, no murmurs, rubs or gallops  Resp: She has scant bilateral wheezes, but otherwise lungs are clear to auscultation  Abd:  Soft, non-tender, non-distended, bowel sounds present, no organomegaly or masses  Ext:  No clubbing, cyanosis or edema  Lymph:  No cervical, supraclavicular, axillary, inguinal or femoral adenopathy  Neuro:  MS as above, grossly nonfocal exam.    PATHOLOGY:    08/22/2022            ENDOSCOPY:    Bronchoscopy: Dr. Murrieta 8/22/2022  Operation: Flexible fiberoptic bronchoscopy     Findings: Tumor emanating from the right middle lobe     Specimen(s): Endobronchial biopsies right middle lobe.  BAL right middle lobe.  Cytology brush right middle lobe.  Transbronchial needle aspiration station 12 R and station 7     Estimated Blood Loss: Minimal/None     Complications: No immediate      IMAGING:    CT Chest with Contrast 08/10/22      NM PET/CT Skull Base to Mid Thigh (08/24/2022 11:30)  FINDINGS:      HEAD:    Brain:  Unremarkable as visualized.    Nasopharynx:  Unremarkable.      NECK:    Hypopharynx:  Unremarkable.    Larynx:  Unremarkable.    Trachea:  Unremarkable.    Retropharyngeal space:  Unremarkable.    Submandibular/parotid glands:  Unremarkable.  Glands are normal in  size.    Thyroid:  Unremarkable.  No enlarged or calcified nodules.      CHEST:    Lungs:  Central masslike process involving the right hilum centered  around the right middle lobe main stem bronchus that may represent a  sharath conglomeration or a primary mass and is 4.3 cm. FDG  hypermetabolism is noted with maximum SUV: 7.8.  11 mm nodule right  lower lobe shows no FDG hypermetabolism.  Emphysema is noted.     Pleural space:  Unremarkable.  No significant effusion.  No  pneumothorax.    Heart:  Cardiomegaly.  CABG.  No significant pericardial effusion.    Mediastinum:  Unremarkable.  No mass.      ABDOMEN:    Liver:  Unremarkable.    Gallbladder and bile ducts:  Unremarkable.  No calcified stones.  No  ductal dilation.    Pancreas:  Unremarkable.  No ductal dilation.    Spleen:  Unremarkable.  No splenomegaly.    Adrenals:  Unremarkable.  No mass.    Kidneys and ureters:  Atrophy right kidney.  Compensatory hypertrophy  left kidney.  Nonobstructing left kidney stones.    Stomach and bowel:  Sigmoid diverticulosis without evidence of acute  diverticulitis.  No obstruction.      PELVIS:    Appendix:  No findings to suggest acute appendicitis.    Bladder:  Unremarkable.    Reproductive:  Unremarkable as visualized.      WHOLE BODY:    Intraperitoneal space:  Unremarkable.  No significant fluid  collection.  No free air.    Bones/joints:  Unremarkable.  No acute fracture.  No dislocation.    Soft tissues:  Unremarkable.    Vasculature:  Advanced atherosclerosis of the aortoiliac vasculature.   No aortic aneurysm.    Lymph nodes:  Enlarged right suprahilar lymph node or primary mass is  noted that is approximately 2.5 cm and produces mass effect on the right  mainstem bronchus. FDG hypermetabolism is noted. Maximum SUV: 8.8.     IMPRESSION:  1.  Central masslike process involving the right hilum centered around  the right middle lobe main stem bronchus that may represent a sharath  conglomeration or a primary mass and is 4.3 cm. FDG hypermetabolism is  noted with maximum SUV: 7.8.  2.  Enlarged right suprahilar lymph node or primary mass is noted that  is approximately 2.5 cm and produces mass effect on the right mainstem  bronchus. FDG hypermetabolism is noted. Maximum SUV: 8.8.  3.  11 mm nodule right lower lobe shows no FDG hypermetabolism.  4. Other incidental and nonacute findings as above.    CT Head With Contrast  (09/09/2022 12:10)  FINDINGS:   No acute intracranial abnormality. No midline shift or mass  effect. No hydrocephalus or intracranial hemorrhage. There is no CT  evidence of acute vascular territory infarct.  No pathologic contrast  enhancement identified. No enhancing extra-axial fluid collections. No  enhancing brain parenchymal lesions. Bone windows show no acute osseous  abnormality.     IMPRESSION:  1. No acute intracranial abnormality.  2. No pathologic contrast enhancement.        RECENT LABS:  Lab Results   Component Value Date    WBC 6.73 10/19/2022    HGB 9.9 (L) 10/19/2022    HCT 29.1 (L) 10/19/2022    MCV 91.8 10/19/2022    RDW 15.2 10/19/2022     10/19/2022    NEUTRORELPCT 72.0 09/28/2022    LYMPHORELPCT 10.0 (L) 09/28/2022    MONORELPCT 13.9 (H) 09/28/2022    EOSRELPCT 0.0 (L) 09/28/2022    BASORELPCT 0.4 09/28/2022    NEUTROABS 4.64 10/19/2022    LYMPHSABS 1.14 09/28/2022       Lab Results   Component Value Date     10/19/2022    K 4.0 10/19/2022    CO2 21.0 (L) 10/19/2022     10/19/2022    BUN 29 (H) 10/19/2022    CREATININE 0.80 10/19/2022    GLUCOSE 90 10/19/2022    CALCIUM 9.6 10/19/2022    ALKPHOS 70 10/19/2022    AST 16 10/19/2022    ALT 8 10/19/2022    BILITOT 0.4 10/19/2022    ALBUMIN 4.28 10/19/2022    PROTEINTOT 6.6 10/19/2022    MG 2.0 09/28/2022     Lab Results   Component Value Date    FERRITIN 438.50 (H) 10/19/2022    IRON 54 10/19/2022    TIBC 270 (L) 10/19/2022    LABIRON 20 10/19/2022    [id number] 211 08/26/2022    FOLATE 14.20 08/26/2022     Lab Results   Component Value Date    TSH 0.638 10/19/2022     LDH   Date Value Ref Range Status   09/14/2022 264 (H) 135 - 214 U/L Final     Uric Acid   Date Value Ref Range Status   09/14/2022 2.1 (L) 2.4 - 5.7 mg/dL Final         ASSESSMENT & PLAN:  Serenity Villegas is a very pleasant 67 y.o. female with newly diagnosed limited stage small cell lung cancer.    1.  Limited stage small cell lung cancer:  - I have recommended  concurrent chemotherapy and radiation with cisplatin and etoposide.  We discussed potential risks benefits and side effects and she decided to proceed.  -  She has taken 2 cycles of Cis/Etoposide and is just over 1/2 way through her radiation.  -  Unfortunately she is struggling w po intake due esophageal toxicity from radiation. Will address as below.  -  Will hold C3 Chemotherapy 1 week while we work to get her pain / nutritional status under control and plan to proceed next week.   - Discontinue Allopurinol.      2.  Weight loss / poor po intake / esophageal pain:  -  She has lost 9 lbs over the last week and her son says she will sometimes go 2 days without eating.  -  Will start Oxycodone 5 mg 1-3 tabs q4h as needed to control pain.  Continue to use magic mouthwash.  -  We discussed many different ideas to help with eating, discussing food textures, protein content, patient food preferences.    -  Asked her to start taking Marinol 2.5 mg BID to help w/ appetite / nausea.  -  1L NS today for dehydration.    2.  Prophylaxis:  - She did not have 2021 influenza vaccine.  I recommended she get this years vaccine.  - Received. Prevnar 20 vaccine today.  - She has not had COVID vaccinations.  I recommended that she get this completed.  Could also consider Evusheld treatment.    3.  ACO / BALJIT/Other  Quality measures  -  Serenity Villegas did not receive 2022 flu vaccine.  This was recommended to her.  -  Serenity Villegas reports a pain score of 0.  Given her pain assessment as noted, treatment options were discussed and the following options were decided upon as a follow-up plan to address the patient's pain: No intervention needed at this time.  -  Current outpatient and discharge medications have been reconciled for the patient.  Reviewed by: Rhianna Wood MD      4.  Follow-up:  - Hold chemotherapy 1 week  -  Give 1L NS today for dehydration  -  Schedule Marinol 2.5 mg BID  -  Start Oxycodone 5 mg 1-3 tabs q4h prn  pain to help her eat  -  Nutitionist will reach out to her.  -  Return in 1 week with plan to resume chemotherapy at that time.  -  Will need 2022 influenza vaccine.      I spent 30 minutes with Serenity Villegas today.  In the office today, more than 50% of this time was spent face-to-face with her  in counseling / coordination of care, reviewing her medical history and counseling on the current treatment plan.  All questions were answered to her satisfaction      Electronically Signed by: Rhianna Wood MD      CC:     DO Amari Russell MD Weisi Yan, MD

## 2022-10-18 NOTE — PROGRESS NOTES
On Treatment Visit Note      Patient Name: Serenity Villegas  : 1955   MRN: [id number]     Diagnosis:  Lung Cancer   Staging: III T2N2M0    This patient was seen today for an on treatment visit.     Radiation Treatments     Active   Plans   [email or web address]_new   Most recent treatment: Dose planned: 200 cGy (fraction 17 on 10/18/2022)   Total: Dose planned: 6,000 cGy (30 fractions)   Elapsed Days: 22      Reference Points   [id number]   Most recent treatment: Dose given: 200 cGy (on 10/18/2022)   Total: Dose given: 3,400 cGy   Elapsed Days: 22         Historical   No historical radiation treatments to show.            Subjective      Review of Systems:   Review of Systems   Constitutional: Positive for appetite change.   HENT: Positive for sore throat and trouble swallowing. Negative for mouth sores.    Respiratory: Positive for cough. Negative for shortness of breath.    Cardiovascular: Negative for chest pain.   Gastrointestinal: Positive for nausea. Negative for vomiting.   Skin: Negative for color change.   All other systems reviewed and are negative.      Medications:     Current Outpatient Medications:   •  albuterol sulfate  (90 Base) MCG/ACT inhaler, , Disp: , Rfl:   •  allopurinol (ZYLOPRIM) 300 MG tablet, Take 1 tablet by mouth Daily., Disp: 30 tablet, Rfl: 3  •  Anoro Ellipta 62.5-25 MCG/INH aerosol powder  inhaler, , Disp: , Rfl:   •  atenolol (TENORMIN) 50 MG tablet, , Disp: , Rfl:   •  atorvastatin (LIPITOR) 40 MG tablet, , Disp: , Rfl:   •  benazepril (LOTENSIN) 20 MG tablet, , Disp: , Rfl:   •  Cyanocobalamin (Vitamin B-12) 500 MCG sublingual tablet, Place 500 mcg under the tongue Daily., Disp: 30 tablet, Rfl: 5  •  Diclofenac Sodium (VOLTAREN) 1 % gel gel, , Disp: , Rfl:   •  dronabinol (Marinol) 2.5 MG capsule, Take 1 capsule by mouth 2 (Two) Times a Day Before Meals., Disp: 60 capsule, Rfl: 0  •  escitalopram (LEXAPRO) 10 MG tablet, Take 1 tablet by mouth Daily., Disp: 30 tablet, Rfl: 1  •   esomeprazole (nexIUM) 40 MG capsule, Take 1 capsule by mouth Every Morning Before Breakfast., Disp: 60 capsule, Rfl: 1  •  HYDROcodone-acetaminophen (Norco) 7.5-325 MG per tablet, Take 1 tablet by mouth 4 (Four) Times a Day As Needed for Moderate Pain., Disp: 8 tablet, Rfl: 0  •  levocetirizine (XYZAL) 5 MG tablet, , Disp: , Rfl:   •  lidocaine-prilocaine (EMLA) 2.5-2.5 % cream, Apply to Port-A-Cath site 30 minutes prior to arrival at infusion clinic., Disp: 30 g, Rfl: 1  •  loratadine (CLARITIN) 10 MG tablet, Take 1 tablet by mouth Daily., Disp: 30 tablet, Rfl: 1  •  montelukast (SINGULAIR) 10 MG tablet, , Disp: , Rfl:   •  OLANZapine (zyPREXA) 5 MG tablet, Take one tablet at HS on days 2, 3 and 4 after chemotherapy., Disp: 3 tablet, Rfl: 4  •  ondansetron (ZOFRAN) 8 MG tablet, Take 1 tablet by mouth Every 8 (Eight) Hours As Needed for Nausea or Vomiting., Disp: 30 tablet, Rfl: 1  •  prochlorperazine (COMPAZINE) 10 MG tablet, Take 1 tablet by mouth Every 6 (Six) Hours As Needed for Nausea or Vomiting., Disp: 60 tablet, Rfl: 1  •  magic mouthwash oral suspension, Swish and swallow 10 mL Every 4 (Four) Hours As Needed for Mucositis., Disp: 1 each, Rfl: 3    Allergies:   No Known Allergies  Objective     Physical Exam:  Physical Exam  Vitals reviewed.   Constitutional:       Appearance: Normal appearance.   Pulmonary:      Effort: Pulmonary effort is normal.   Skin:     General: Skin is warm and dry.   Neurological:      Mental Status: She is alert. Mental status is at baseline.   Psychiatric:         Mood and Affect: Mood normal.         Vital Signs:   Vitals:    10/18/22 1119   BP: 138/77   Pulse: 87   Resp: 18   Temp: 97.3 °F (36.3 °C)   TempSrc: Temporal   SpO2: 98%   Weight: 51 kg (112 lb 6.4 oz)   PainSc: 0-No pain     Body mass index is 19.91 kg/m².     Current Total XRT Dose (cGY):    Radiation Treatments     Active   Plans   RtLung_new   Most recent treatment: Dose planned: 200 cGy (fraction 17 on  10/18/2022)   Total: Dose planned: 6,000 cGy (30 fractions)   Elapsed Days: 22      Reference Points   [id number]   Most recent treatment: Dose given: 200 cGy (on 10/18/2022)   Total: Dose given: 3,400 cGy   Elapsed Days: 22         Historical   No historical radiation treatments to show.            Plan      Plan:   Patient was seen today for an on treatment visit. Patient is receiving radiation therapy to the right lung. Patient is stable and tolerating radiation therapy well with minimal side effects.  Patient continues to have trouble swallowing/sore throat.  MMW Rx refilled today.  Continue eating small frequent meals, purée if needed.  Instructed to hydrate well at home.  Continue with radiation therapy.     I have reviewed treatment setup notes, checked and approved the daily guidance images. I reviewed dose delivery, treatment parameters and deemed them appropriate. We plan to continue radiation therapy as prescribed.     Digital speech recognition software was used to dictate parts of this note, some dictation errors may occur.    Electronically signed by Arsenio Spain MD, 10/18/22, 11:58 AM EDT.

## 2022-10-19 ENCOUNTER — OFFICE VISIT (OUTPATIENT)
Dept: ONCOLOGY | Facility: CLINIC | Age: 67
End: 2022-10-19

## 2022-10-19 ENCOUNTER — DOCUMENTATION (OUTPATIENT)
Dept: NUTRITION | Facility: HOSPITAL | Age: 67
End: 2022-10-19

## 2022-10-19 ENCOUNTER — LAB (OUTPATIENT)
Dept: ONCOLOGY | Facility: CLINIC | Age: 67
End: 2022-10-19

## 2022-10-19 ENCOUNTER — INFUSION (OUTPATIENT)
Dept: ONCOLOGY | Facility: HOSPITAL | Age: 67
End: 2022-10-19

## 2022-10-19 VITALS
WEIGHT: 114.4 LBS | TEMPERATURE: 97.1 F | HEIGHT: 64 IN | DIASTOLIC BLOOD PRESSURE: 73 MMHG | SYSTOLIC BLOOD PRESSURE: 112 MMHG | RESPIRATION RATE: 8 BRPM | HEART RATE: 89 BPM | BODY MASS INDEX: 19.53 KG/M2 | OXYGEN SATURATION: 98 %

## 2022-10-19 VITALS
OXYGEN SATURATION: 98 % | BODY MASS INDEX: 19.53 KG/M2 | HEART RATE: 89 BPM | HEIGHT: 64 IN | DIASTOLIC BLOOD PRESSURE: 73 MMHG | SYSTOLIC BLOOD PRESSURE: 112 MMHG | TEMPERATURE: 97.1 F | WEIGHT: 114.4 LBS | RESPIRATION RATE: 18 BRPM

## 2022-10-19 DIAGNOSIS — G89.3 NEOPLASM RELATED PAIN: ICD-10-CM

## 2022-10-19 DIAGNOSIS — C34.90 SMALL CELL LUNG CANCER: Primary | ICD-10-CM

## 2022-10-19 DIAGNOSIS — R53.83 FATIGUE, UNSPECIFIED TYPE: ICD-10-CM

## 2022-10-19 DIAGNOSIS — Z95.828 PORT-A-CATH IN PLACE: ICD-10-CM

## 2022-10-19 DIAGNOSIS — R63.0 ANOREXIA: ICD-10-CM

## 2022-10-19 DIAGNOSIS — C34.90 SMALL CELL LUNG CANCER: ICD-10-CM

## 2022-10-19 LAB
ALBUMIN SERPL-MCNC: 4.28 G/DL (ref 3.5–5.2)
ALBUMIN/GLOB SERPL: 1.8 G/DL
ALP SERPL-CCNC: 70 U/L (ref 39–117)
ALT SERPL W P-5'-P-CCNC: 8 U/L (ref 1–33)
ANION GAP SERPL CALCULATED.3IONS-SCNC: 18 MMOL/L (ref 5–15)
ANISOCYTOSIS BLD QL: ABNORMAL
AST SERPL-CCNC: 16 U/L (ref 1–32)
BASOPHILS # BLD MANUAL: 0.07 10*3/MM3 (ref 0–0.2)
BASOPHILS NFR BLD MANUAL: 1 % (ref 0–1.5)
BILIRUB SERPL-MCNC: 0.4 MG/DL (ref 0–1.2)
BUN SERPL-MCNC: 29 MG/DL (ref 8–23)
BUN/CREAT SERPL: 36.3 (ref 7–25)
CALCIUM SPEC-SCNC: 9.6 MG/DL (ref 8.6–10.5)
CHLORIDE SERPL-SCNC: 101 MMOL/L (ref 98–107)
CO2 SERPL-SCNC: 21 MMOL/L (ref 22–29)
CREAT SERPL-MCNC: 0.8 MG/DL (ref 0.57–1)
DEPRECATED RDW RBC AUTO: 46.3 FL (ref 37–54)
EGFRCR SERPLBLD CKD-EPI 2021: 81.9 ML/MIN/1.73
EOSINOPHIL # BLD MANUAL: 0.07 10*3/MM3 (ref 0–0.4)
EOSINOPHIL NFR BLD MANUAL: 1 % (ref 0.3–6.2)
ERYTHROCYTE [DISTWIDTH] IN BLOOD BY AUTOMATED COUNT: 15.2 % (ref 12.3–15.4)
FERRITIN SERPL-MCNC: 438.5 NG/ML (ref 13–150)
FOLATE SERPL-MCNC: 9.98 NG/ML (ref 4.78–24.2)
GLOBULIN UR ELPH-MCNC: 2.3 GM/DL
GLUCOSE SERPL-MCNC: 90 MG/DL (ref 65–99)
HCT VFR BLD AUTO: 29.1 % (ref 34–46.6)
HGB BLD-MCNC: 9.9 G/DL (ref 12–15.9)
IRON 24H UR-MRATE: 54 MCG/DL (ref 37–145)
IRON SATN MFR SERPL: 20 % (ref 20–50)
LYMPHOCYTES # BLD MANUAL: 0.87 10*3/MM3 (ref 0.7–3.1)
LYMPHOCYTES NFR BLD MANUAL: 12 % (ref 5–12)
MCH RBC QN AUTO: 31.2 PG (ref 26.6–33)
MCHC RBC AUTO-ENTMCNC: 34 G/DL (ref 31.5–35.7)
MCV RBC AUTO: 91.8 FL (ref 79–97)
METAMYELOCYTES NFR BLD MANUAL: 3 % (ref 0–0)
MONOCYTES # BLD: 0.81 10*3/MM3 (ref 0.1–0.9)
MYELOCYTES NFR BLD MANUAL: 1 % (ref 0–0)
NEUTROPHILS # BLD AUTO: 4.64 10*3/MM3 (ref 1.7–7)
NEUTROPHILS NFR BLD MANUAL: 68 % (ref 42.7–76)
NEUTS BAND NFR BLD MANUAL: 1 % (ref 0–5)
NRBC SPEC MANUAL: 1 /100 WBC (ref 0–0.2)
PLAT MORPH BLD: NORMAL
PLATELET # BLD AUTO: 270 10*3/MM3 (ref 140–450)
PMV BLD AUTO: 9.2 FL (ref 6–12)
POTASSIUM SERPL-SCNC: 4 MMOL/L (ref 3.5–5.2)
PROT SERPL-MCNC: 6.6 G/DL (ref 6–8.5)
RBC # BLD AUTO: 3.17 10*6/MM3 (ref 3.77–5.28)
SODIUM SERPL-SCNC: 140 MMOL/L (ref 136–145)
TIBC SERPL-MCNC: 270 MCG/DL (ref 298–536)
TRANSFERRIN SERPL-MCNC: 181 MG/DL (ref 200–360)
TSH SERPL DL<=0.05 MIU/L-ACNC: 0.64 UIU/ML (ref 0.27–4.2)
VARIANT LYMPHS NFR BLD MANUAL: 13 % (ref 19.6–45.3)
VIT B12 BLD-MCNC: 1220 PG/ML (ref 211–946)
WBC NRBC COR # BLD: 6.73 10*3/MM3 (ref 3.4–10.8)

## 2022-10-19 PROCEDURE — 80053 COMPREHEN METABOLIC PANEL: CPT | Performed by: INTERNAL MEDICINE

## 2022-10-19 PROCEDURE — 77386: CPT | Performed by: RADIOLOGY

## 2022-10-19 PROCEDURE — 99214 OFFICE O/P EST MOD 30 MIN: CPT | Performed by: INTERNAL MEDICINE

## 2022-10-19 PROCEDURE — 84443 ASSAY THYROID STIM HORMONE: CPT | Performed by: INTERNAL MEDICINE

## 2022-10-19 PROCEDURE — 96360 HYDRATION IV INFUSION INIT: CPT

## 2022-10-19 PROCEDURE — 84466 ASSAY OF TRANSFERRIN: CPT | Performed by: INTERNAL MEDICINE

## 2022-10-19 PROCEDURE — 83540 ASSAY OF IRON: CPT | Performed by: INTERNAL MEDICINE

## 2022-10-19 PROCEDURE — 85025 COMPLETE CBC W/AUTO DIFF WBC: CPT | Performed by: INTERNAL MEDICINE

## 2022-10-19 PROCEDURE — 36415 COLL VENOUS BLD VENIPUNCTURE: CPT

## 2022-10-19 PROCEDURE — 82607 VITAMIN B-12: CPT | Performed by: INTERNAL MEDICINE

## 2022-10-19 PROCEDURE — 82728 ASSAY OF FERRITIN: CPT | Performed by: INTERNAL MEDICINE

## 2022-10-19 PROCEDURE — 85007 BL SMEAR W/DIFF WBC COUNT: CPT | Performed by: INTERNAL MEDICINE

## 2022-10-19 PROCEDURE — 77014 CHG CT GUIDANCE RADIATION THERAPY FLDS PLACEMENT: CPT | Performed by: RADIOLOGY

## 2022-10-19 PROCEDURE — 82746 ASSAY OF FOLIC ACID SERUM: CPT | Performed by: INTERNAL MEDICINE

## 2022-10-19 PROCEDURE — 25010000002 HEPARIN LOCK FLUSH PER 10 UNITS: Performed by: INTERNAL MEDICINE

## 2022-10-19 RX ORDER — AMOXICILLIN 250 MG
2 CAPSULE ORAL 2 TIMES DAILY
Qty: 120 TABLET | Refills: 0 | Status: SHIPPED | OUTPATIENT
Start: 2022-10-19

## 2022-10-19 RX ORDER — SODIUM CHLORIDE 0.9 % (FLUSH) 0.9 %
10 SYRINGE (ML) INJECTION AS NEEDED
Status: CANCELLED | OUTPATIENT
Start: 2022-10-26

## 2022-10-19 RX ORDER — HEPARIN SODIUM (PORCINE) LOCK FLUSH IV SOLN 100 UNIT/ML 100 UNIT/ML
500 SOLUTION INTRAVENOUS AS NEEDED
Status: DISCONTINUED | OUTPATIENT
Start: 2022-10-19 | End: 2022-10-19 | Stop reason: HOSPADM

## 2022-10-19 RX ORDER — HEPARIN SODIUM (PORCINE) LOCK FLUSH IV SOLN 100 UNIT/ML 100 UNIT/ML
500 SOLUTION INTRAVENOUS AS NEEDED
Status: CANCELLED | OUTPATIENT
Start: 2022-10-26

## 2022-10-19 RX ORDER — OXYCODONE HYDROCHLORIDE 5 MG/1
TABLET ORAL
Qty: 180 TABLET | Refills: 0 | Status: SHIPPED | OUTPATIENT
Start: 2022-10-19 | End: 2022-11-14 | Stop reason: SDUPTHER

## 2022-10-19 RX ORDER — SODIUM CHLORIDE 0.9 % (FLUSH) 0.9 %
10 SYRINGE (ML) INJECTION AS NEEDED
Status: DISCONTINUED | OUTPATIENT
Start: 2022-10-19 | End: 2022-10-19 | Stop reason: HOSPADM

## 2022-10-19 RX ADMIN — SODIUM CHLORIDE 1000 ML: 9 INJECTION, SOLUTION INTRAVENOUS at 10:02

## 2022-10-19 RX ADMIN — SODIUM CHLORIDE, PRESERVATIVE FREE 500 UNITS: 5 INJECTION INTRAVENOUS at 11:08

## 2022-10-19 RX ADMIN — Medication 10 ML: at 11:08

## 2022-10-19 NOTE — PROGRESS NOTES
Nutrition Services    Patient Name:  Serenity Villegas  YOB: 1955  MRN: [id number]  Admit Date:  (Not on file)    Consulted to educate patient re: nutritional needs / wt loss / loss of appetite.  Noted weight loss of 9 lbs in 1 week.  Spoke with patient she stated the problem is that she can not eat due to pain / feels like food stuck.  Patient stated it doesn't matter whether it is solid foods, liquids, hot or cold foods /liquids.   Estimated nutrition needs 9323-8326 kcal (30-35 kcal /kg), 62 gm protein (1.2 gm /kg), 7368-8068 ml fluid (1 ml /kcal).  I discussed with patient need for enough calories.    Patient stated she has been drinking ~ 1 supplemental drink per day ([employer] or premier protein).  I advised on adding a scoop of ice cream for increased kcal's.  I reviewed high calorie high protein snacks, shake recipes, encouraged 6 small meals per day, and advised if unable to eat meals to take in 5-6 shakes per day to meet nutritional needs.  Patient stated they were starting her on a medicine to help her swallowing.   Patient stated she wants to eat but the issue is she can't due to esophageal pain that started when radiation started.    Recommend MD consider SLP eval for Barium Swallow or GI referral to evaluate esophageal pain.  Please continue to monitor labs and weight status.  RD available as needed further.     Electronically signed by:  Vijaya Martinez RD  10/19/22 11:14 EDT

## 2022-10-20 ENCOUNTER — APPOINTMENT (OUTPATIENT)
Dept: ONCOLOGY | Facility: HOSPITAL | Age: 67
End: 2022-10-20

## 2022-10-20 DIAGNOSIS — C34.90 SMALL CELL LUNG CANCER: Primary | ICD-10-CM

## 2022-10-20 PROCEDURE — 77386: CPT | Performed by: RADIOLOGY

## 2022-10-20 PROCEDURE — 77014 CHG CT GUIDANCE RADIATION THERAPY FLDS PLACEMENT: CPT | Performed by: RADIOLOGY

## 2022-10-21 PROCEDURE — 77014 CHG CT GUIDANCE RADIATION THERAPY FLDS PLACEMENT: CPT | Performed by: RADIOLOGY

## 2022-10-21 PROCEDURE — 77386: CPT | Performed by: RADIOLOGY

## 2022-10-24 PROCEDURE — 77386: CPT | Performed by: RADIOLOGY

## 2022-10-24 PROCEDURE — 77014 CHG CT GUIDANCE RADIATION THERAPY FLDS PLACEMENT: CPT | Performed by: RADIOLOGY

## 2022-10-24 PROCEDURE — 77336 RADIATION PHYSICS CONSULT: CPT | Performed by: RADIOLOGY

## 2022-10-25 ENCOUNTER — RADIATION ONCOLOGY WEEKLY ASSESSMENT (OUTPATIENT)
Dept: RADIATION ONCOLOGY | Facility: HOSPITAL | Age: 67
End: 2022-10-25

## 2022-10-25 VITALS
SYSTOLIC BLOOD PRESSURE: 117 MMHG | BODY MASS INDEX: 19.74 KG/M2 | HEART RATE: 64 BPM | DIASTOLIC BLOOD PRESSURE: 73 MMHG | WEIGHT: 115 LBS | OXYGEN SATURATION: 97 % | TEMPERATURE: 96.2 F | RESPIRATION RATE: 18 BRPM

## 2022-10-25 DIAGNOSIS — C34.90 SMALL CELL LUNG CANCER: Primary | ICD-10-CM

## 2022-10-25 PROCEDURE — 77014 CHG CT GUIDANCE RADIATION THERAPY FLDS PLACEMENT: CPT | Performed by: RADIOLOGY

## 2022-10-25 PROCEDURE — 77386: CPT | Performed by: RADIOLOGY

## 2022-10-25 PROCEDURE — 77427 RADIATION TX MANAGEMENT X5: CPT | Performed by: RADIOLOGY

## 2022-10-25 NOTE — PROGRESS NOTES
On Treatment Visit Note      Patient Name: Serenity Villegas  : 1955   MRN: [id number]     Diagnosis:  Lung Cancer   Staging: III T2N2M0     This patient was seen today for an on treatment visit.     Radiation Treatments     Active   Plans   RtLung_new   Most recent treatment: Dose planned: 200 cGy (fraction 22 on 10/25/2022)   Total: Dose planned: 6,000 cGy (30 fractions)   Elapsed Days: 29      Reference Points   [id number]   Most recent treatment: Dose given: 200 cGy (on 10/25/2022)   Total: Dose given: 4,400 cGy   Elapsed Days: 29         Historical   No historical radiation treatments to show.            Subjective      Review of Systems:   Review of Systems   Constitutional: Positive for fatigue. Negative for unexpected weight loss.   HENT: Negative for mouth sores, sore throat and trouble swallowing.    Respiratory: Negative for cough and shortness of breath.    Cardiovascular: Negative for chest pain.   Skin: Negative for color change and dry skin.   All other systems reviewed and are negative.      Medications:     Current Outpatient Medications:   •  albuterol sulfate  (90 Base) MCG/ACT inhaler, , Disp: , Rfl:   •  allopurinol (ZYLOPRIM) 300 MG tablet, Take 1 tablet by mouth Daily., Disp: 30 tablet, Rfl: 3  •  Anoro Ellipta 62.5-25 MCG/INH aerosol powder  inhaler, , Disp: , Rfl:   •  atenolol (TENORMIN) 50 MG tablet, , Disp: , Rfl:   •  atorvastatin (LIPITOR) 40 MG tablet, , Disp: , Rfl:   •  benazepril (LOTENSIN) 20 MG tablet, , Disp: , Rfl:   •  Cyanocobalamin (Vitamin B-12) 500 MCG sublingual tablet, Place 500 mcg under the tongue Daily., Disp: 30 tablet, Rfl: 5  •  Diclofenac Sodium (VOLTAREN) 1 % gel gel, , Disp: , Rfl:   •  dronabinol (Marinol) 2.5 MG capsule, Take 1 capsule by mouth 2 (Two) Times a Day Before Meals., Disp: 60 capsule, Rfl: 0  •  escitalopram (LEXAPRO) 10 MG tablet, Take 1 tablet by mouth Daily., Disp: 30 tablet, Rfl: 1  •  esomeprazole (nexIUM) 40 MG capsule, Take  1 capsule by mouth Every Morning Before Breakfast., Disp: 60 capsule, Rfl: 1  •  levocetirizine (XYZAL) 5 MG tablet, , Disp: , Rfl:   •  lidocaine-prilocaine (EMLA) 2.5-2.5 % cream, Apply to Port-A-Cath site 30 minutes prior to arrival at infusion clinic., Disp: 30 g, Rfl: 1  •  loratadine (CLARITIN) 10 MG tablet, Take 1 tablet by mouth Daily., Disp: 30 tablet, Rfl: 1  •  magic mouthwash oral suspension, Swish and swallow 10 mL Every 4 (Four) Hours As Needed for Mucositis., Disp: 1 each, Rfl: 3  •  montelukast (SINGULAIR) 10 MG tablet, , Disp: , Rfl:   •  OLANZapine (zyPREXA) 5 MG tablet, Take one tablet at HS on days 2, 3 and 4 after chemotherapy., Disp: 3 tablet, Rfl: 4  •  ondansetron (ZOFRAN) 8 MG tablet, Take 1 tablet by mouth Every 8 (Eight) Hours As Needed for Nausea or Vomiting., Disp: 30 tablet, Rfl: 1  •  oxyCODONE (Roxicodone) 5 MG immediate release tablet, Take 1 to 3 tablets by mouth every 4 hours as needed for pain., Disp: 180 tablet, Rfl: 0  •  prochlorperazine (COMPAZINE) 10 MG tablet, Take 1 tablet by mouth Every 6 (Six) Hours As Needed for Nausea or Vomiting., Disp: 60 tablet, Rfl: 1  •  sennosides-docusate (senna-docusate sodium) 8.6-50 MG per tablet, Take 2 tablets by mouth 2 (Two) Times a Day., Disp: 120 tablet, Rfl: 0    Allergies:   No Known Allergies  Objective     Physical Exam:  Physical Exam  Vitals reviewed.   Constitutional:       Appearance: Normal appearance.   Pulmonary:      Effort: Pulmonary effort is normal.   Skin:     General: Skin is warm and dry.   Neurological:      Mental Status: She is alert. Mental status is at baseline.   Psychiatric:         Mood and Affect: Mood normal.         Vital Signs:   Vitals:    10/25/22 1102   BP: 117/73   Pulse: 64   Resp: 18   Temp: 96.2 °F (35.7 °C)   TempSrc: Temporal   SpO2: 97%   Weight: 52.2 kg (115 lb)   PainSc: 0-No pain     Body mass index is 19.74 kg/m².     Current Total XRT Dose (cGY):    Radiation Treatments     Active   Plans    RtLung_new   Most recent treatment: Dose planned: 200 cGy (fraction 22 on 10/25/2022)   Total: Dose planned: 6,000 cGy (30 fractions)   Elapsed Days: 29      Reference Points   [id number]   Most recent treatment: Dose given: 200 cGy (on 10/25/2022)   Total: Dose given: 4,400 cGy   Elapsed Days: 29         Historical   No historical radiation treatments to show.            Plan      Plan:   Patient was seen today for an on treatment visit. Patient is receiving radiation therapy to the right lung. Patient is stable and tolerating radiation therapy well with minimal side effects.  Patient states she feels much better today.  No new issues.  Skin looks good.  Nutrition is good today.  Continue with radiation therapy.     I have reviewed treatment setup notes, checked and approved the daily guidance images. I reviewed dose delivery, treatment parameters and deemed them appropriate. We plan to continue radiation therapy as prescribed.     Digital speech recognition software was used to dictate parts of this note, some dictation errors may occur.    Electronically signed by Arsenio Spain MD, 10/25/22, 11:34 AM EDT.

## 2022-10-25 NOTE — PROGRESS NOTES
NAME: Serenity Villegas    : 1955    DATE:  10/28/2022     DIAGNOSIS:  Limited Stage Small Cell Lung Cancer     CHIEF COMPLAINT:  Follow up lung cancer    TREATMENT:  1.        2. Radiation on Right Lung with Dr. Spain  Radiation Treatments      Active   Plans   RtLung_new   Most recent treatment: Dose planned: 200 cGy (fraction 12 on 10/11/2022)   Total: Dose planned: 6,000 cGy (30 fractions)   Elapsed Days: 15      Reference Points   [id number]   Most recent treatment: Dose given: 200 cGy (on 10/11/2022)   Total: Dose given: 2,400 cGy   Elapsed Days: 15                  HISTORY OF PRESENT ILLNESS:   Serenity Villegas is a very pleasant 67 y.o. female who is being seen today in the presence of her son at the request of Dr. Moy Murrieta for evaluation and treatment of newly diagnosed lung cancer.  Ms. Villegas is quite anxious today.  She says that s she initially began to feel poorly approximately 6 weeks ago.  She started to feel short of breath, initially at night, but then progressively worse during the day as well, and says she noticed a rattling sensation in her chest.  She denies any chest pain, but says that she noticed her activity began to be affected.  She would feel very short of breath with minimal exertion and started to have trouble picking up her 2-year-old grandson.  Her son took her to a new primary care physician who promptly ordered CT imaging which was abnormal.  This led to her seeing Dr. Murrieta, and she underwent bronchoscopy on 2022.  Biopsies and BAL from the [facility] as well as 12 R and 7 lymph node stations all revealed small cell lung cancer.  PET/CT done 2022 revealed a central masslike process involving the right hilum centered around the right middle lobe mainstem bronchus which was approximately 4.3 cm in dimension (SUV 7.8) as well as a right suprahilar lymph node which was 2.5 cm in diameter with mass-effect on the right mainstem bronchus.  There was also an 11 mm nodule  in the right lower lobe which did not show FDG hypermetabolism.  There was no evidence of distant metastatic disease.  She was referred here for further evaluation and treatment.    Ms. Villegas notes that she has lost approximately 4 pounds over the last several weeks.  Her appetite has been decent, but she does notice that she has mild nausea over the last several weeks.  She occasionally vomits, but not routinely.  She denies significant chest pain, but does occasionally get twinges of pain.  She complains of shortness of breath especially with activity and cough productive of clear sputum.  She denies abdominal pain.  She denies diarrhea or constipation.  She chronically gets headaches (maybe once to twice a month).  This is typical for her and she has a history of what sounds like classical migraine which has been going on for several years.  These headaches are not changed.  She denies neurologic symptoms.    INTERVAL HISTORY:  Ms. Villegas is here today with her son for follow-up of small cell lung cancer.  Since her last visit, she is doing much, much better.  Her pain is coming under good control.  She says she is taking oxycodone 5 mg approximately 3 times a day.  With this she is able to eat and drink well.  She has gained back 2 pound since last week.  She does notice that around the time of her chemotherapy she will get increased nausea and acid reflux.  She has not been taking Nexium but is willing to restart.  She has not been taking the Marinol because she says it makes her feel funny, but she has not been nauseous.  She is otherwise doing well without complaints.  She and her son have some questions about upcoming treatment.    PAST MEDICAL HISTORY:  Past Medical History:   Diagnosis Date   • Arthritis    • Cancer (HCC)    • Coronary artery disease    • Heart disease    • Heartburn    • Hyperlipidemia    • Hypertension    • Right lower lobe lung mass        PAST SURGICAL HISTORY:  Past Surgical  "History:   Procedure Laterality Date   • BRONCHOSCOPY N/A 2022    Procedure: BRONCHOSCOPY WITH ENDOBRONCHIAL ULTRASOUND;  Surgeon: Amari Murrieta MD;  Location:  [facility] OR;  Service: Pulmonary;  Laterality: N/A;   • CARDIAC SURGERY      3 2002   [facility] [place]   • HYSTERECTOMY      arh   • PACEMAKER IMPLANTATION      [facility]   • VENOUS ACCESS DEVICE (PORT) INSERTION N/A 2022    Procedure: INSERTION VENOUS ACCESS DEVICE;  Surgeon: Ting Sarah MD;  Location:  [facility] OR;  Service: General;  Laterality: N/A;   Hysterectomy performed for endometriosis (benign) \"a long time ago\"    FAMILY HISTORY:  Family History   Problem Relation Age of Onset   • Cancer Mother    • Heart disease Father    • Heart attack Father    Mother  with melanoma  Maternal grandmother  of unknown type malignancy    SOCIAL HISTORY:  Social History     Socioeconomic History   • Marital status:    Tobacco Use   • Smoking status: Every Day     Packs/day: 0.25     Years: 20.00     Pack years: 5.00     Types: Cigarettes   • Smokeless tobacco: Never   • Tobacco comments:     1  pack per day history   Vaping Use   • Vaping Use: Never used   Substance and Sexual Activity   • Alcohol use: Never   • Drug use: Never   • Sexual activity: Defer   Ms. Villegas lives alone.  Her son accompanies her to her visit today and is very supportive.  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  No unusual chemical exposures.  She is a smoker and reports she currently smokes about half pack per day      REVIEW OF SYSTEMS:   A comprehensive 14 point review of systems was performed.  Significant findings as mentioned above.  All other systems reviewed and are negative.      MEDICATIONS:  The current medication list was reviewed in the EMR    Current Outpatient Medications:   •  albuterol sulfate  (90 Base) MCG/ACT inhaler, , Disp: , Rfl:   •  allopurinol (ZYLOPRIM) 300 MG tablet, Take 1 tablet by mouth Daily., " Disp: 30 tablet, Rfl: 3  •  Anoro Ellipta 62.5-25 MCG/INH aerosol powder  inhaler, , Disp: , Rfl:   •  atenolol (TENORMIN) 50 MG tablet, , Disp: , Rfl:   •  atorvastatin (LIPITOR) 40 MG tablet, , Disp: , Rfl:   •  benazepril (LOTENSIN) 20 MG tablet, , Disp: , Rfl:   •  Cyanocobalamin (Vitamin B-12) 500 MCG sublingual tablet, Place 500 mcg under the tongue Daily., Disp: 30 tablet, Rfl: 5  •  Diclofenac Sodium (VOLTAREN) 1 % gel gel, , Disp: , Rfl:   •  dronabinol (Marinol) 2.5 MG capsule, Take 1 capsule by mouth 2 (Two) Times a Day Before Meals., Disp: 60 capsule, Rfl: 0  •  escitalopram (LEXAPRO) 10 MG tablet, Take 1 tablet by mouth Daily., Disp: 30 tablet, Rfl: 1  •  esomeprazole (nexIUM) 40 MG capsule, Take 1 capsule by mouth 2 (Two) Times a Day., Disp: 60 capsule, Rfl: 3  •  levocetirizine (XYZAL) 5 MG tablet, , Disp: , Rfl:   •  lidocaine-prilocaine (EMLA) 2.5-2.5 % cream, Apply to Port-A-Cath site 30 minutes prior to arrival at infusion clinic., Disp: 30 g, Rfl: 1  •  loratadine (CLARITIN) 10 MG tablet, Take 1 tablet by mouth Daily., Disp: 30 tablet, Rfl: 1  •  magic mouthwash oral suspension, Swish and swallow 10 mL Every 4 (Four) Hours As Needed for Mucositis., Disp: 1 each, Rfl: 3  •  montelukast (SINGULAIR) 10 MG tablet, , Disp: , Rfl:   •  OLANZapine (zyPREXA) 5 MG tablet, Take one tablet at HS on days 2, 3 and 4 after chemotherapy., Disp: 3 tablet, Rfl: 4  •  ondansetron (ZOFRAN) 8 MG tablet, Take 1 tablet by mouth Every 8 (Eight) Hours As Needed for Nausea or Vomiting., Disp: 30 tablet, Rfl: 1  •  oxyCODONE (Roxicodone) 5 MG immediate release tablet, Take 1 to 3 tablets by mouth every 4 hours as needed for pain., Disp: 180 tablet, Rfl: 0  •  prochlorperazine (COMPAZINE) 10 MG tablet, Take 1 tablet by mouth Every 6 (Six) Hours As Needed for Nausea or Vomiting., Disp: 60 tablet, Rfl: 1  •  sennosides-docusate (senna-docusate sodium) 8.6-50 MG per tablet, Take 2 tablets by mouth 2 (Two) Times a Day., Disp:  120 tablet, Rfl: 0  No current facility-administered medications for this visit.    ALLERGIES:  No Known Allergies    PHYSICAL EXAM:  Vitals:    10/28/22 0859   BP: 160/95   Pulse: 96   Resp: 18   Temp: 97.3 °F (36.3 °C)   TempSrc: Temporal   SpO2: 99%   Weight: 54.2 kg (119 lb 6.4 oz)   PainSc: 0-No pain       ECOG score: 0     General:  Awake, alert and oriented.  Appears well.  In good spirits.  HEENT:  Pupils are equal, round and reactive to light and accommodation, Extra-ocular movements full, Oropharyx clear, mucous membranes moist  Neck:  No JVD, thyromegaly or lymphadenopathy  CV:  Regular rate and rhythm, no murmurs, rubs or gallops  Resp: Lungs are clear to auscultation bilaterally, no wheezing  Abd:  Soft, non-tender, non-distended, bowel sounds present, no organomegaly or masses  Ext:  No clubbing, cyanosis.  She has trace ankle edema bilaterally.  Lymph:  No cervical, supraclavicular, axillary, inguinal or femoral adenopathy  Neuro:  MS as above, grossly nonfocal exam.    PATHOLOGY:    08/22/2022            ENDOSCOPY:    Bronchoscopy: Dr. Murrieta 8/22/2022  Operation: Flexible fiberoptic bronchoscopy     Findings: Tumor emanating from the right middle lobe     Specimen(s): Endobronchial biopsies right middle lobe.  BAL right middle lobe.  Cytology brush right middle lobe.  Transbronchial needle aspiration station 12 R and station 7     Estimated Blood Loss: Minimal/None     Complications: No immediate      IMAGING:    CT Chest with Contrast 08/10/22      NM PET/CT Skull Base to Mid Thigh (08/24/2022 11:30)  FINDINGS:      HEAD:    Brain:  Unremarkable as visualized.    Nasopharynx:  Unremarkable.      NECK:    Hypopharynx:  Unremarkable.    Larynx:  Unremarkable.    Trachea:  Unremarkable.    Retropharyngeal space:  Unremarkable.    Submandibular/parotid glands:  Unremarkable.  Glands are normal in  size.    Thyroid:  Unremarkable.  No enlarged or calcified nodules.      CHEST:    Lungs:  Central  masslike process involving the right hilum centered  around the right middle lobe main stem bronchus that may represent a  sharath conglomeration or a primary mass and is 4.3 cm. FDG  hypermetabolism is noted with maximum SUV: 7.8.  11 mm nodule right  lower lobe shows no FDG hypermetabolism.  Emphysema is noted.    Pleural space:  Unremarkable.  No significant effusion.  No  pneumothorax.    Heart:  Cardiomegaly.  CABG.  No significant pericardial effusion.    Mediastinum:  Unremarkable.  No mass.      ABDOMEN:    Liver:  Unremarkable.    Gallbladder and bile ducts:  Unremarkable.  No calcified stones.  No  ductal dilation.    Pancreas:  Unremarkable.  No ductal dilation.    Spleen:  Unremarkable.  No splenomegaly.    Adrenals:  Unremarkable.  No mass.    Kidneys and ureters:  Atrophy right kidney.  Compensatory hypertrophy  left kidney.  Nonobstructing left kidney stones.    Stomach and bowel:  Sigmoid diverticulosis without evidence of acute  diverticulitis.  No obstruction.      PELVIS:    Appendix:  No findings to suggest acute appendicitis.    Bladder:  Unremarkable.    Reproductive:  Unremarkable as visualized.      WHOLE BODY:    Intraperitoneal space:  Unremarkable.  No significant fluid  collection.  No free air.    Bones/joints:  Unremarkable.  No acute fracture.  No dislocation.    Soft tissues:  Unremarkable.    Vasculature:  Advanced atherosclerosis of the aortoiliac vasculature.   No aortic aneurysm.    Lymph nodes:  Enlarged right suprahilar lymph node or primary mass is  noted that is approximately 2.5 cm and produces mass effect on the right  mainstem bronchus. FDG hypermetabolism is noted. Maximum SUV: 8.8.     IMPRESSION:  1.  Central masslike process involving the right hilum centered around  the right middle lobe main stem bronchus that may represent a sharath  conglomeration or a primary mass and is 4.3 cm. FDG hypermetabolism is  noted with maximum SUV: 7.8.  2.  Enlarged right suprahilar lymph  node or primary mass is noted that  is approximately 2.5 cm and produces mass effect on the right mainstem  bronchus. FDG hypermetabolism is noted. Maximum SUV: 8.8.  3.  11 mm nodule right lower lobe shows no FDG hypermetabolism.  4. Other incidental and nonacute findings as above.    CT Head With Contrast (09/09/2022 12:10)  FINDINGS:   No acute intracranial abnormality. No midline shift or mass  effect. No hydrocephalus or intracranial hemorrhage. There is no CT  evidence of acute vascular territory infarct.  No pathologic contrast  enhancement identified. No enhancing extra-axial fluid collections. No  enhancing brain parenchymal lesions. Bone windows show no acute osseous  abnormality.     IMPRESSION:  1. No acute intracranial abnormality.  2. No pathologic contrast enhancement.        RECENT LABS:  Lab Results   Component Value Date    WBC 9.91 10/28/2022    HGB 10.1 (L) 10/28/2022    HCT 30.4 (L) 10/28/2022    MCV 94.7 10/28/2022    RDW 17.9 (H) 10/28/2022     10/28/2022    NEUTRORELPCT 89.5 (H) 10/28/2022    LYMPHORELPCT 1.6 (L) 10/28/2022    MONORELPCT 8.4 10/28/2022    EOSRELPCT 0.0 (L) 10/28/2022    BASORELPCT 0.1 10/28/2022    NEUTROABS 8.87 (H) 10/28/2022    LYMPHSABS 0.16 (L) 10/28/2022       Lab Results   Component Value Date     10/28/2022    K 4.1 10/28/2022    CO2 22.1 10/28/2022     10/28/2022    BUN 24 (H) 10/28/2022    CREATININE 0.89 10/28/2022    GLUCOSE 103 (H) 10/28/2022    CALCIUM 9.1 10/28/2022    ALKPHOS 56 10/28/2022    AST 17 10/28/2022    ALT 11 10/28/2022    BILITOT 0.3 10/28/2022    ALBUMIN 3.96 10/28/2022    PROTEINTOT 6.1 10/28/2022    MG 2.1 10/28/2022     Lab Results   Component Value Date    FERRITIN 438.50 (H) 10/19/2022    IRON 54 10/19/2022    TIBC 270 (L) 10/19/2022    LABIRON 20 10/19/2022    [id number] 1,220 (H) 10/19/2022    FOLATE 9.98 10/19/2022     Lab Results   Component Value Date    TSH 0.638 10/19/2022     LDH   Date Value Ref Range Status    09/14/2022 264 (H) 135 - 214 U/L Final     Uric Acid   Date Value Ref Range Status   09/14/2022 2.1 (L) 2.4 - 5.7 mg/dL Final         ASSESSMENT & PLAN:  Serenity Villegas is a very pleasant 67 y.o. female with newly diagnosed limited stage small cell lung cancer.    1.  Limited stage small cell lung cancer:  - I have recommended concurrent chemotherapy and radiation with cisplatin and etoposide.  We discussed potential risks benefits and side effects and she decided to proceed.  -  She has taken 2 cycles of Cis/Etoposide and is taking concurrent radiation.  -When she presented last week, she was really struggling w po intake due esophageal toxicity from radiation.  We addressed this as below, and she has now doing much better.  -  Will proceed with C3 Chemotherapy today  -Return in 1 week to see APRN for toxicity check.  I will see her back in 2 weeks.    2.  Weight loss / poor po intake / esophageal pain:  -Started oxycodone 5 mg 1-3 tabs q4h as needed to control pain.  Continued to use magic mouthwash.  She has only needed 5 mg 1 tablet 3 times a day and with this has had good pain control and has been able to eat and drink well.  - She has gained 2 pounds since last week..    -Could take Marinol 2.5 mg BID to help w/ appetite / nausea, but she says she does not like how it makes her feel so she has not done that.  - For uncontrolled acid reflux, will restart Nexium at 40 mg twice daily.    2.  Prophylaxis:  - She did not have 2021 influenza vaccine.  I recommended she get this years vaccine.  - Received Prevnar 20 vaccine  - She has not had COVID vaccinations.  I recommended that she get this completed.  Could also consider Evusheld treatment.    3.  ACO / BALJIT/Other  Quality measures  -  Serenity Villegas did not receive 2022 flu vaccine.  This was recommended to her.  -  Serenity Villegas reports a pain score of .  Given her pain assessment as noted, treatment options were discussed and the following options were  decided upon as a follow-up plan to address the patient's pain: No intervention needed at this time.  -  Current outpatient and discharge medications have been reconciled for the patient.  Reviewed by: Rhianna Wood MD      4.  Follow-up:  -Proceed with cycle 3 chemotherapy today.  - Continue supportive care  - Restart Nexium 40 mg twice daily  - Encouraged to continue Zofran 8 mg 3 times daily and to use Compazine if needed in between if she should have nausea.  She knows to call if this is not successful.  -Continue oxycodone as above  -  Schedule Marinol 2.5 mg BID  -  Will need 2022 influenza vaccine.  This has been recommended to her.  - We will have her return in 1 week to see APRN for toxicity check with CBC and CMP.  I will plan to see her back in 2 weeks with CBC and CMP      I spent 30 minutes with Serenity Nelly today.  In the office today, more than 50% of this time was spent face-to-face with her  in counseling / coordination of care, reviewing her medical history and counseling on the current treatment plan.  All questions were answered to her satisfaction      Electronically Signed by: Rhianna Wood MD      CC:     DO Amari Russell MD Weisi Yan, MD

## 2022-10-26 ENCOUNTER — INFUSION (OUTPATIENT)
Dept: ONCOLOGY | Facility: HOSPITAL | Age: 67
End: 2022-10-26

## 2022-10-26 ENCOUNTER — LAB (OUTPATIENT)
Dept: ONCOLOGY | Facility: HOSPITAL | Age: 67
End: 2022-10-26

## 2022-10-26 VITALS
HEART RATE: 82 BPM | TEMPERATURE: 96.9 F | SYSTOLIC BLOOD PRESSURE: 134 MMHG | BODY MASS INDEX: 19.62 KG/M2 | OXYGEN SATURATION: 97 % | WEIGHT: 114.3 LBS | RESPIRATION RATE: 18 BRPM | DIASTOLIC BLOOD PRESSURE: 77 MMHG

## 2022-10-26 DIAGNOSIS — C34.90 SMALL CELL LUNG CANCER: ICD-10-CM

## 2022-10-26 DIAGNOSIS — Z95.828 PORT-A-CATH IN PLACE: ICD-10-CM

## 2022-10-26 DIAGNOSIS — C34.90 SMALL CELL LUNG CANCER: Primary | ICD-10-CM

## 2022-10-26 LAB
ALBUMIN SERPL-MCNC: 3.92 G/DL (ref 3.5–5.2)
ALBUMIN/GLOB SERPL: 2.1 G/DL
ALP SERPL-CCNC: 54 U/L (ref 39–117)
ALT SERPL W P-5'-P-CCNC: 7 U/L (ref 1–33)
ANION GAP SERPL CALCULATED.3IONS-SCNC: 10.3 MMOL/L (ref 5–15)
AST SERPL-CCNC: 14 U/L (ref 1–32)
BASOPHILS # BLD AUTO: 0.04 10*3/MM3 (ref 0–0.2)
BASOPHILS NFR BLD AUTO: 0.8 % (ref 0–1.5)
BILIRUB SERPL-MCNC: 0.3 MG/DL (ref 0–1.2)
BUN SERPL-MCNC: 18 MG/DL (ref 8–23)
BUN/CREAT SERPL: 26.1 (ref 7–25)
CALCIUM SPEC-SCNC: 9.1 MG/DL (ref 8.6–10.5)
CHLORIDE SERPL-SCNC: 104 MMOL/L (ref 98–107)
CO2 SERPL-SCNC: 23.7 MMOL/L (ref 22–29)
CREAT SERPL-MCNC: 0.69 MG/DL (ref 0.57–1)
DEPRECATED RDW RBC AUTO: 55.7 FL (ref 37–54)
EGFRCR SERPLBLD CKD-EPI 2021: 96.5 ML/MIN/1.73
EOSINOPHIL # BLD AUTO: 0.01 10*3/MM3 (ref 0–0.4)
EOSINOPHIL NFR BLD AUTO: 0.2 % (ref 0.3–6.2)
ERYTHROCYTE [DISTWIDTH] IN BLOOD BY AUTOMATED COUNT: 17 % (ref 12.3–15.4)
GLOBULIN UR ELPH-MCNC: 1.9 GM/DL
GLUCOSE SERPL-MCNC: 95 MG/DL (ref 65–99)
HCT VFR BLD AUTO: 27.7 % (ref 34–46.6)
HGB BLD-MCNC: 9.3 G/DL (ref 12–15.9)
IMM GRANULOCYTES # BLD AUTO: 0.02 10*3/MM3 (ref 0–0.05)
IMM GRANULOCYTES NFR BLD AUTO: 0.4 % (ref 0–0.5)
LYMPHOCYTES # BLD AUTO: 0.37 10*3/MM3 (ref 0.7–3.1)
LYMPHOCYTES NFR BLD AUTO: 7.7 % (ref 19.6–45.3)
MAGNESIUM SERPL-MCNC: 1.8 MG/DL (ref 1.6–2.4)
MCH RBC QN AUTO: 31.4 PG (ref 26.6–33)
MCHC RBC AUTO-ENTMCNC: 33.6 G/DL (ref 31.5–35.7)
MCV RBC AUTO: 93.6 FL (ref 79–97)
MONOCYTES # BLD AUTO: 0.72 10*3/MM3 (ref 0.1–0.9)
MONOCYTES NFR BLD AUTO: 15 % (ref 5–12)
NEUTROPHILS NFR BLD AUTO: 3.64 10*3/MM3 (ref 1.7–7)
NEUTROPHILS NFR BLD AUTO: 75.9 % (ref 42.7–76)
NRBC BLD AUTO-RTO: 0 /100 WBC (ref 0–0.2)
PLATELET # BLD AUTO: 213 10*3/MM3 (ref 140–450)
PMV BLD AUTO: 9.3 FL (ref 6–12)
POTASSIUM SERPL-SCNC: 4.2 MMOL/L (ref 3.5–5.2)
PROT SERPL-MCNC: 5.8 G/DL (ref 6–8.5)
RBC # BLD AUTO: 2.96 10*6/MM3 (ref 3.77–5.28)
SODIUM SERPL-SCNC: 138 MMOL/L (ref 136–145)
WBC NRBC COR # BLD: 4.8 10*3/MM3 (ref 3.4–10.8)

## 2022-10-26 PROCEDURE — 85025 COMPLETE CBC W/AUTO DIFF WBC: CPT

## 2022-10-26 PROCEDURE — 25010000002 HEPARIN LOCK FLUSH PER 10 UNITS: Performed by: INTERNAL MEDICINE

## 2022-10-26 PROCEDURE — 25010000002 FOSAPREPITANT PER 1 MG: Performed by: INTERNAL MEDICINE

## 2022-10-26 PROCEDURE — 96413 CHEMO IV INFUSION 1 HR: CPT

## 2022-10-26 PROCEDURE — 25010000002 CISPLATIN PER 50 MG: Performed by: INTERNAL MEDICINE

## 2022-10-26 PROCEDURE — 80053 COMPREHEN METABOLIC PANEL: CPT

## 2022-10-26 PROCEDURE — 77014 CHG CT GUIDANCE RADIATION THERAPY FLDS PLACEMENT: CPT | Performed by: RADIOLOGY

## 2022-10-26 PROCEDURE — 25010000002 PALONOSETRON 0.25 MG/5ML SOLUTION PREFILLED SYRINGE: Performed by: INTERNAL MEDICINE

## 2022-10-26 PROCEDURE — 83735 ASSAY OF MAGNESIUM: CPT

## 2022-10-26 PROCEDURE — 77386: CPT | Performed by: RADIOLOGY

## 2022-10-26 PROCEDURE — 25010000002 SODIUM CHLORIDE 0.9 % WITH KCL 20 MEQ 20-0.9 MEQ/L-% SOLUTION: Performed by: INTERNAL MEDICINE

## 2022-10-26 PROCEDURE — 96367 TX/PROPH/DG ADDL SEQ IV INF: CPT

## 2022-10-26 PROCEDURE — 96375 TX/PRO/DX INJ NEW DRUG ADDON: CPT

## 2022-10-26 PROCEDURE — 25010000002 ETOPOSIDE 500 MG/25ML SOLUTION 25 ML VIAL: Performed by: INTERNAL MEDICINE

## 2022-10-26 PROCEDURE — 96417 CHEMO IV INFUS EACH ADDL SEQ: CPT

## 2022-10-26 PROCEDURE — 96415 CHEMO IV INFUSION ADDL HR: CPT

## 2022-10-26 PROCEDURE — 25010000002 DEXAMETHASONE SODIUM PHOSPHATE 20 MG/5ML SOLUTION: Performed by: INTERNAL MEDICINE

## 2022-10-26 PROCEDURE — 96368 THER/DIAG CONCURRENT INF: CPT

## 2022-10-26 PROCEDURE — 25010000002 MAGNESIUM SULFATE 2 GM/50ML SOLUTION: Performed by: INTERNAL MEDICINE

## 2022-10-26 RX ORDER — SODIUM CHLORIDE 0.9 % (FLUSH) 0.9 %
10 SYRINGE (ML) INJECTION AS NEEDED
Status: CANCELLED | OUTPATIENT
Start: 2022-10-27

## 2022-10-26 RX ORDER — SODIUM CHLORIDE AND POTASSIUM CHLORIDE 150; 900 MG/100ML; MG/100ML
500 INJECTION, SOLUTION INTRAVENOUS CONTINUOUS
Status: DISPENSED | OUTPATIENT
Start: 2022-10-26 | End: 2022-10-26

## 2022-10-26 RX ORDER — OLANZAPINE 5 MG/1
5 TABLET ORAL ONCE
Status: COMPLETED | OUTPATIENT
Start: 2022-10-26 | End: 2022-10-26

## 2022-10-26 RX ORDER — HEPARIN SODIUM (PORCINE) LOCK FLUSH IV SOLN 100 UNIT/ML 100 UNIT/ML
500 SOLUTION INTRAVENOUS AS NEEDED
Status: DISCONTINUED | OUTPATIENT
Start: 2022-10-26 | End: 2022-10-26 | Stop reason: HOSPADM

## 2022-10-26 RX ORDER — SODIUM CHLORIDE 9 MG/ML
250 INJECTION, SOLUTION INTRAVENOUS ONCE
Status: DISCONTINUED | OUTPATIENT
Start: 2022-10-26 | End: 2022-10-26 | Stop reason: HOSPADM

## 2022-10-26 RX ORDER — PALONOSETRON 0.05 MG/ML
0.25 INJECTION, SOLUTION INTRAVENOUS ONCE
Status: COMPLETED | OUTPATIENT
Start: 2022-10-26 | End: 2022-10-26

## 2022-10-26 RX ORDER — SODIUM CHLORIDE 0.9 % (FLUSH) 0.9 %
10 SYRINGE (ML) INJECTION AS NEEDED
Status: DISCONTINUED | OUTPATIENT
Start: 2022-10-26 | End: 2022-10-26 | Stop reason: HOSPADM

## 2022-10-26 RX ORDER — HEPARIN SODIUM (PORCINE) LOCK FLUSH IV SOLN 100 UNIT/ML 100 UNIT/ML
500 SOLUTION INTRAVENOUS AS NEEDED
Status: CANCELLED | OUTPATIENT
Start: 2022-10-27

## 2022-10-26 RX ORDER — MAGNESIUM SULFATE HEPTAHYDRATE 40 MG/ML
2 INJECTION, SOLUTION INTRAVENOUS ONCE
Status: COMPLETED | OUTPATIENT
Start: 2022-10-26 | End: 2022-10-26

## 2022-10-26 RX ADMIN — SODIUM CHLORIDE AND POTASSIUM CHLORIDE 500 ML/HR: .9; .15 SOLUTION INTRAVENOUS at 11:51

## 2022-10-26 RX ADMIN — PALONOSETRON 0.25 MG: 0.25 INJECTION, SOLUTION INTRAVENOUS at 09:33

## 2022-10-26 RX ADMIN — Medication 10 ML: at 14:21

## 2022-10-26 RX ADMIN — Medication 500 UNITS: at 14:21

## 2022-10-26 RX ADMIN — CISPLATIN 115 MG: 1 INJECTION INTRAVENOUS at 10:29

## 2022-10-26 RX ADMIN — FOSAPREPITANT 150 MG: 150 INJECTION, POWDER, LYOPHILIZED, FOR SOLUTION INTRAVENOUS at 09:45

## 2022-10-26 RX ADMIN — ETOPOSIDE 150 MG: 20 INJECTION, SOLUTION, CONCENTRATE INTRAVENOUS at 12:30

## 2022-10-26 RX ADMIN — SODIUM CHLORIDE AND POTASSIUM CHLORIDE 500 ML/HR: .9; .15 SOLUTION INTRAVENOUS at 09:37

## 2022-10-26 RX ADMIN — OLANZAPINE 5 MG: 5 TABLET, FILM COATED ORAL at 09:28

## 2022-10-26 RX ADMIN — MAGNESIUM SULFATE HEPTAHYDRATE 2 G: 40 INJECTION, SOLUTION INTRAVENOUS at 10:29

## 2022-10-26 RX ADMIN — DEXAMETHASONE SODIUM PHOSPHATE 12 MG: 4 INJECTION INTRA-ARTICULAR; INTRALESIONAL; INTRAMUSCULAR; INTRAVENOUS; SOFT TISSUE at 09:35

## 2022-10-27 ENCOUNTER — INFUSION (OUTPATIENT)
Dept: ONCOLOGY | Facility: HOSPITAL | Age: 67
End: 2022-10-27

## 2022-10-27 VITALS
BODY MASS INDEX: 20.19 KG/M2 | OXYGEN SATURATION: 94 % | SYSTOLIC BLOOD PRESSURE: 133 MMHG | TEMPERATURE: 97.1 F | RESPIRATION RATE: 18 BRPM | DIASTOLIC BLOOD PRESSURE: 76 MMHG | WEIGHT: 117.6 LBS | HEART RATE: 84 BPM

## 2022-10-27 DIAGNOSIS — C34.90 SMALL CELL LUNG CANCER: Primary | ICD-10-CM

## 2022-10-27 DIAGNOSIS — Z95.828 PORT-A-CATH IN PLACE: ICD-10-CM

## 2022-10-27 PROCEDURE — 96413 CHEMO IV INFUSION 1 HR: CPT

## 2022-10-27 PROCEDURE — 25010000002 ETOPOSIDE 500 MG/25ML SOLUTION 25 ML VIAL: Performed by: INTERNAL MEDICINE

## 2022-10-27 PROCEDURE — 96375 TX/PRO/DX INJ NEW DRUG ADDON: CPT

## 2022-10-27 PROCEDURE — 25010000002 HEPARIN LOCK FLUSH PER 10 UNITS: Performed by: INTERNAL MEDICINE

## 2022-10-27 PROCEDURE — 77386: CPT | Performed by: RADIOLOGY

## 2022-10-27 PROCEDURE — 77014 CHG CT GUIDANCE RADIATION THERAPY FLDS PLACEMENT: CPT | Performed by: RADIOLOGY

## 2022-10-27 PROCEDURE — 25010000002 DEXAMETHASONE SODIUM PHOSPHATE 20 MG/5ML SOLUTION: Performed by: INTERNAL MEDICINE

## 2022-10-27 RX ORDER — HEPARIN SODIUM (PORCINE) LOCK FLUSH IV SOLN 100 UNIT/ML 100 UNIT/ML
500 SOLUTION INTRAVENOUS AS NEEDED
Status: DISCONTINUED | OUTPATIENT
Start: 2022-10-27 | End: 2022-10-27 | Stop reason: HOSPADM

## 2022-10-27 RX ORDER — SODIUM CHLORIDE 0.9 % (FLUSH) 0.9 %
10 SYRINGE (ML) INJECTION AS NEEDED
Status: DISCONTINUED | OUTPATIENT
Start: 2022-10-27 | End: 2022-10-27 | Stop reason: HOSPADM

## 2022-10-27 RX ORDER — SODIUM CHLORIDE 9 MG/ML
250 INJECTION, SOLUTION INTRAVENOUS ONCE
Status: COMPLETED | OUTPATIENT
Start: 2022-10-27 | End: 2022-10-27

## 2022-10-27 RX ORDER — HEPARIN SODIUM (PORCINE) LOCK FLUSH IV SOLN 100 UNIT/ML 100 UNIT/ML
500 SOLUTION INTRAVENOUS AS NEEDED
Status: CANCELLED | OUTPATIENT
Start: 2022-10-28

## 2022-10-27 RX ORDER — SODIUM CHLORIDE 0.9 % (FLUSH) 0.9 %
10 SYRINGE (ML) INJECTION AS NEEDED
Status: CANCELLED | OUTPATIENT
Start: 2022-10-28

## 2022-10-27 RX ADMIN — ETOPOSIDE 150 MG: 20 INJECTION, SOLUTION, CONCENTRATE INTRAVENOUS at 09:58

## 2022-10-27 RX ADMIN — HEPARIN 500 UNITS: 100 SYRINGE at 11:25

## 2022-10-27 RX ADMIN — Medication 10 ML: at 11:25

## 2022-10-27 RX ADMIN — DEXAMETHASONE SODIUM PHOSPHATE 12 MG: 4 INJECTION INTRA-ARTICULAR; INTRALESIONAL; INTRAMUSCULAR; INTRAVENOUS; SOFT TISSUE at 09:30

## 2022-10-27 RX ADMIN — SODIUM CHLORIDE 250 ML: 9 INJECTION, SOLUTION INTRAVENOUS at 09:29

## 2022-10-28 ENCOUNTER — LAB (OUTPATIENT)
Dept: ONCOLOGY | Facility: CLINIC | Age: 67
End: 2022-10-28

## 2022-10-28 ENCOUNTER — INFUSION (OUTPATIENT)
Dept: ONCOLOGY | Facility: HOSPITAL | Age: 67
End: 2022-10-28

## 2022-10-28 ENCOUNTER — OFFICE VISIT (OUTPATIENT)
Dept: ONCOLOGY | Facility: CLINIC | Age: 67
End: 2022-10-28

## 2022-10-28 VITALS
OXYGEN SATURATION: 99 % | DIASTOLIC BLOOD PRESSURE: 95 MMHG | WEIGHT: 119.4 LBS | HEART RATE: 96 BPM | BODY MASS INDEX: 20.49 KG/M2 | RESPIRATION RATE: 18 BRPM | TEMPERATURE: 97.3 F | SYSTOLIC BLOOD PRESSURE: 160 MMHG

## 2022-10-28 VITALS
BODY MASS INDEX: 20.49 KG/M2 | DIASTOLIC BLOOD PRESSURE: 95 MMHG | TEMPERATURE: 97.3 F | HEART RATE: 96 BPM | SYSTOLIC BLOOD PRESSURE: 160 MMHG | WEIGHT: 119.4 LBS | OXYGEN SATURATION: 99 % | RESPIRATION RATE: 18 BRPM

## 2022-10-28 DIAGNOSIS — R63.0 ANOREXIA: ICD-10-CM

## 2022-10-28 DIAGNOSIS — G89.3 NEOPLASM RELATED PAIN: ICD-10-CM

## 2022-10-28 DIAGNOSIS — C34.90 SMALL CELL LUNG CANCER: Primary | ICD-10-CM

## 2022-10-28 DIAGNOSIS — K21.9 GASTROESOPHAGEAL REFLUX DISEASE WITHOUT ESOPHAGITIS: ICD-10-CM

## 2022-10-28 DIAGNOSIS — C34.90 SMALL CELL LUNG CANCER: ICD-10-CM

## 2022-10-28 DIAGNOSIS — R11.0 NAUSEA: ICD-10-CM

## 2022-10-28 DIAGNOSIS — Z95.828 PORT-A-CATH IN PLACE: ICD-10-CM

## 2022-10-28 LAB
ALBUMIN SERPL-MCNC: 3.96 G/DL (ref 3.5–5.2)
ALBUMIN/GLOB SERPL: 1.9 G/DL
ALP SERPL-CCNC: 56 U/L (ref 39–117)
ALT SERPL W P-5'-P-CCNC: 11 U/L (ref 1–33)
ANION GAP SERPL CALCULATED.3IONS-SCNC: 11.9 MMOL/L (ref 5–15)
AST SERPL-CCNC: 17 U/L (ref 1–32)
BASOPHILS # BLD AUTO: 0.01 10*3/MM3 (ref 0–0.2)
BASOPHILS NFR BLD AUTO: 0.1 % (ref 0–1.5)
BILIRUB SERPL-MCNC: 0.3 MG/DL (ref 0–1.2)
BUN SERPL-MCNC: 24 MG/DL (ref 8–23)
BUN/CREAT SERPL: 27 (ref 7–25)
CALCIUM SPEC-SCNC: 9.1 MG/DL (ref 8.6–10.5)
CHLORIDE SERPL-SCNC: 103 MMOL/L (ref 98–107)
CO2 SERPL-SCNC: 22.1 MMOL/L (ref 22–29)
CREAT SERPL-MCNC: 0.89 MG/DL (ref 0.57–1)
DEPRECATED RDW RBC AUTO: 58.5 FL (ref 37–54)
EGFRCR SERPLBLD CKD-EPI 2021: 72.1 ML/MIN/1.73
EOSINOPHIL # BLD AUTO: 0 10*3/MM3 (ref 0–0.4)
EOSINOPHIL NFR BLD AUTO: 0 % (ref 0.3–6.2)
ERYTHROCYTE [DISTWIDTH] IN BLOOD BY AUTOMATED COUNT: 17.9 % (ref 12.3–15.4)
GLOBULIN UR ELPH-MCNC: 2.1 GM/DL
GLUCOSE SERPL-MCNC: 103 MG/DL (ref 65–99)
HCT VFR BLD AUTO: 30.4 % (ref 34–46.6)
HGB BLD-MCNC: 10.1 G/DL (ref 12–15.9)
IMM GRANULOCYTES # BLD AUTO: 0.04 10*3/MM3 (ref 0–0.05)
IMM GRANULOCYTES NFR BLD AUTO: 0.4 % (ref 0–0.5)
LYMPHOCYTES # BLD AUTO: 0.16 10*3/MM3 (ref 0.7–3.1)
LYMPHOCYTES NFR BLD AUTO: 1.6 % (ref 19.6–45.3)
MAGNESIUM SERPL-MCNC: 2.1 MG/DL (ref 1.6–2.4)
MCH RBC QN AUTO: 31.5 PG (ref 26.6–33)
MCHC RBC AUTO-ENTMCNC: 33.2 G/DL (ref 31.5–35.7)
MCV RBC AUTO: 94.7 FL (ref 79–97)
MONOCYTES # BLD AUTO: 0.83 10*3/MM3 (ref 0.1–0.9)
MONOCYTES NFR BLD AUTO: 8.4 % (ref 5–12)
NEUTROPHILS NFR BLD AUTO: 8.87 10*3/MM3 (ref 1.7–7)
NEUTROPHILS NFR BLD AUTO: 89.5 % (ref 42.7–76)
NRBC BLD AUTO-RTO: 0 /100 WBC (ref 0–0.2)
PLATELET # BLD AUTO: 229 10*3/MM3 (ref 140–450)
PMV BLD AUTO: 9.1 FL (ref 6–12)
POTASSIUM SERPL-SCNC: 4.1 MMOL/L (ref 3.5–5.2)
PROT SERPL-MCNC: 6.1 G/DL (ref 6–8.5)
RBC # BLD AUTO: 3.21 10*6/MM3 (ref 3.77–5.28)
SODIUM SERPL-SCNC: 137 MMOL/L (ref 136–145)
WBC NRBC COR # BLD: 9.91 10*3/MM3 (ref 3.4–10.8)

## 2022-10-28 PROCEDURE — 77386: CPT | Performed by: RADIOLOGY

## 2022-10-28 PROCEDURE — 96413 CHEMO IV INFUSION 1 HR: CPT

## 2022-10-28 PROCEDURE — 85025 COMPLETE CBC W/AUTO DIFF WBC: CPT | Performed by: INTERNAL MEDICINE

## 2022-10-28 PROCEDURE — 80053 COMPREHEN METABOLIC PANEL: CPT | Performed by: INTERNAL MEDICINE

## 2022-10-28 PROCEDURE — 96375 TX/PRO/DX INJ NEW DRUG ADDON: CPT

## 2022-10-28 PROCEDURE — 83735 ASSAY OF MAGNESIUM: CPT | Performed by: INTERNAL MEDICINE

## 2022-10-28 PROCEDURE — 25010000002 DEXAMETHASONE SODIUM PHOSPHATE 20 MG/5ML SOLUTION: Performed by: INTERNAL MEDICINE

## 2022-10-28 PROCEDURE — 99214 OFFICE O/P EST MOD 30 MIN: CPT | Performed by: INTERNAL MEDICINE

## 2022-10-28 PROCEDURE — 25010000002 HEPARIN LOCK FLUSH PER 10 UNITS: Performed by: INTERNAL MEDICINE

## 2022-10-28 PROCEDURE — 25010000002 ETOPOSIDE 500 MG/25ML SOLUTION 25 ML VIAL: Performed by: INTERNAL MEDICINE

## 2022-10-28 PROCEDURE — 77014 CHG CT GUIDANCE RADIATION THERAPY FLDS PLACEMENT: CPT | Performed by: RADIOLOGY

## 2022-10-28 PROCEDURE — 36415 COLL VENOUS BLD VENIPUNCTURE: CPT

## 2022-10-28 RX ORDER — SODIUM CHLORIDE 0.9 % (FLUSH) 0.9 %
10 SYRINGE (ML) INJECTION AS NEEDED
Status: DISCONTINUED | OUTPATIENT
Start: 2022-10-28 | End: 2022-10-28 | Stop reason: HOSPADM

## 2022-10-28 RX ORDER — SODIUM CHLORIDE 0.9 % (FLUSH) 0.9 %
10 SYRINGE (ML) INJECTION AS NEEDED
Status: CANCELLED | OUTPATIENT
Start: 2022-11-04

## 2022-10-28 RX ORDER — HEPARIN SODIUM (PORCINE) LOCK FLUSH IV SOLN 100 UNIT/ML 100 UNIT/ML
500 SOLUTION INTRAVENOUS AS NEEDED
Status: CANCELLED | OUTPATIENT
Start: 2022-11-04

## 2022-10-28 RX ORDER — SODIUM CHLORIDE 9 MG/ML
250 INJECTION, SOLUTION INTRAVENOUS ONCE
Status: COMPLETED | OUTPATIENT
Start: 2022-10-28 | End: 2022-10-28

## 2022-10-28 RX ORDER — ESOMEPRAZOLE MAGNESIUM 40 MG/1
40 CAPSULE, DELAYED RELEASE ORAL 2 TIMES DAILY
Qty: 60 CAPSULE | Refills: 3 | Status: SHIPPED | OUTPATIENT
Start: 2022-10-28

## 2022-10-28 RX ORDER — HEPARIN SODIUM (PORCINE) LOCK FLUSH IV SOLN 100 UNIT/ML 100 UNIT/ML
500 SOLUTION INTRAVENOUS AS NEEDED
Status: DISCONTINUED | OUTPATIENT
Start: 2022-10-28 | End: 2022-10-28 | Stop reason: HOSPADM

## 2022-10-28 RX ADMIN — DEXAMETHASONE SODIUM PHOSPHATE 12 MG: 4 INJECTION INTRA-ARTICULAR; INTRALESIONAL; INTRAMUSCULAR; INTRAVENOUS; SOFT TISSUE at 10:22

## 2022-10-28 RX ADMIN — ETOPOSIDE 150 MG: 20 INJECTION, SOLUTION, CONCENTRATE INTRAVENOUS at 10:47

## 2022-10-28 RX ADMIN — Medication 10 ML: at 12:15

## 2022-10-28 RX ADMIN — SODIUM CHLORIDE 250 ML: 9 INJECTION, SOLUTION INTRAVENOUS at 10:21

## 2022-10-28 RX ADMIN — Medication 500 UNITS: at 12:15

## 2022-10-31 PROCEDURE — 77336 RADIATION PHYSICS CONSULT: CPT | Performed by: RADIOLOGY

## 2022-10-31 PROCEDURE — 77386: CPT | Performed by: RADIOLOGY

## 2022-10-31 PROCEDURE — 77014 CHG CT GUIDANCE RADIATION THERAPY FLDS PLACEMENT: CPT | Performed by: RADIOLOGY

## 2022-11-01 ENCOUNTER — APPOINTMENT (OUTPATIENT)
Dept: RADIATION ONCOLOGY | Facility: HOSPITAL | Age: 67
End: 2022-11-01

## 2022-11-01 ENCOUNTER — RADIATION ONCOLOGY WEEKLY ASSESSMENT (OUTPATIENT)
Dept: RADIATION ONCOLOGY | Facility: HOSPITAL | Age: 67
End: 2022-11-01

## 2022-11-01 VITALS
RESPIRATION RATE: 18 BRPM | HEART RATE: 94 BPM | OXYGEN SATURATION: 97 % | WEIGHT: 111 LBS | TEMPERATURE: 97.1 F | BODY MASS INDEX: 19.05 KG/M2 | DIASTOLIC BLOOD PRESSURE: 85 MMHG | SYSTOLIC BLOOD PRESSURE: 133 MMHG

## 2022-11-01 DIAGNOSIS — C34.90 SMALL CELL LUNG CANCER: Primary | ICD-10-CM

## 2022-11-01 LAB
RAD ONC ARIA COURSE ID: NORMAL
RAD ONC ARIA COURSE INTENT: NORMAL
RAD ONC ARIA COURSE LAST TREATMENT DATE: NORMAL
RAD ONC ARIA COURSE START DATE: NORMAL
RAD ONC ARIA COURSE TREATMENT ELAPSED DAYS: 36
RAD ONC ARIA FIRST TREATMENT DATE: NORMAL
RAD ONC ARIA PLAN FRACTIONS TREATED TO DATE: 27
RAD ONC ARIA PLAN ID: NORMAL
RAD ONC ARIA PLAN PRESCRIBED DOSE PER FRACTION: 2 GY
RAD ONC ARIA PLAN PRIMARY REFERENCE POINT: NORMAL
RAD ONC ARIA PLAN TOTAL FRACTIONS PRESCRIBED: 30
RAD ONC ARIA PLAN TOTAL PRESCRIBED DOSE: 6000 CGY
RAD ONC ARIA REFERENCE POINT DOSAGE GIVEN TO DATE: 54 GY
RAD ONC ARIA REFERENCE POINT ID: NORMAL
RAD ONC ARIA REFERENCE POINT SESSION DOSAGE GIVEN: 2 GY

## 2022-11-01 PROCEDURE — 77386: CPT | Performed by: RADIOLOGY

## 2022-11-01 PROCEDURE — 77014 CHG CT GUIDANCE RADIATION THERAPY FLDS PLACEMENT: CPT | Performed by: RADIOLOGY

## 2022-11-01 PROCEDURE — 77427 RADIATION TX MANAGEMENT X5: CPT | Performed by: RADIOLOGY

## 2022-11-01 NOTE — PROGRESS NOTES
On Treatment Visit Note      Patient Name: Serenity Villegas  : 1955   MRN: [id number]     Diagnosis: Lung Cancer   Staging: III T2N2M0     This patient was seen today for an on treatment visit.     Radiation Treatments     Active   Plans   RtLung_new   Most recent treatment: Dose planned: 200 cGy (fraction 26 on 10/31/2022)   Total: Dose planned: 6,000 cGy (30 fractions)   Elapsed Days: 35      Reference Points   [id number]   Most recent treatment: Dose given: 200 cGy (on 10/31/2022)   Total: Dose given: 5,200 cGy   Elapsed Days: 35         Historical   No historical radiation treatments to show.            Subjective      Review of Systems:   Review of Systems   Constitutional: Negative.        Medications:     Current Outpatient Medications:   •  albuterol sulfate  (90 Base) MCG/ACT inhaler, , Disp: , Rfl:   •  allopurinol (ZYLOPRIM) 300 MG tablet, Take 1 tablet by mouth Daily., Disp: 30 tablet, Rfl: 3  •  Anoro Ellipta 62.5-25 MCG/INH aerosol powder  inhaler, , Disp: , Rfl:   •  atenolol (TENORMIN) 50 MG tablet, , Disp: , Rfl:   •  atorvastatin (LIPITOR) 40 MG tablet, , Disp: , Rfl:   •  benazepril (LOTENSIN) 20 MG tablet, , Disp: , Rfl:   •  Cyanocobalamin (Vitamin B-12) 500 MCG sublingual tablet, Place 500 mcg under the tongue Daily., Disp: 30 tablet, Rfl: 5  •  Diclofenac Sodium (VOLTAREN) 1 % gel gel, , Disp: , Rfl:   •  dronabinol (Marinol) 2.5 MG capsule, Take 1 capsule by mouth 2 (Two) Times a Day Before Meals., Disp: 60 capsule, Rfl: 0  •  escitalopram (LEXAPRO) 10 MG tablet, Take 1 tablet by mouth Daily., Disp: 30 tablet, Rfl: 1  •  esomeprazole (nexIUM) 40 MG capsule, Take 1 capsule by mouth 2 (Two) Times a Day., Disp: 60 capsule, Rfl: 3  •  levocetirizine (XYZAL) 5 MG tablet, , Disp: , Rfl:   •  lidocaine-prilocaine (EMLA) 2.5-2.5 % cream, Apply to Port-A-Cath site 30 minutes prior to arrival at infusion clinic., Disp: 30 g, Rfl: 1  •  loratadine (CLARITIN) 10 MG tablet, Take 1  tablet by mouth Daily., Disp: 30 tablet, Rfl: 1  •  magic mouthwash oral suspension, Swish and swallow 10 mL Every 4 (Four) Hours As Needed for Mucositis., Disp: 1 each, Rfl: 3  •  montelukast (SINGULAIR) 10 MG tablet, , Disp: , Rfl:   •  OLANZapine (zyPREXA) 5 MG tablet, Take one tablet at HS on days 2, 3 and 4 after chemotherapy., Disp: 3 tablet, Rfl: 4  •  ondansetron (ZOFRAN) 8 MG tablet, Take 1 tablet by mouth Every 8 (Eight) Hours As Needed for Nausea or Vomiting., Disp: 30 tablet, Rfl: 1  •  oxyCODONE (Roxicodone) 5 MG immediate release tablet, Take 1 to 3 tablets by mouth every 4 hours as needed for pain., Disp: 180 tablet, Rfl: 0  •  prochlorperazine (COMPAZINE) 10 MG tablet, Take 1 tablet by mouth Every 6 (Six) Hours As Needed for Nausea or Vomiting., Disp: 60 tablet, Rfl: 1  •  sennosides-docusate (senna-docusate sodium) 8.6-50 MG per tablet, Take 2 tablets by mouth 2 (Two) Times a Day., Disp: 120 tablet, Rfl: 0    Allergies:   No Known Allergies  Objective     Physical Exam:  Physical Exam  HENT:      Head: Normocephalic.      Nose: Nose normal.   Cardiovascular:      Rate and Rhythm: Normal rate.   Pulmonary:      Effort: Pulmonary effort is normal.   Neurological:      Mental Status: She is alert.         Vital Signs: There were no vitals filed for this visit.  There is no height or weight on file to calculate BMI.     Current Total XRT Dose (cGY):    Radiation Treatments     Active   Plans   RtLung_new   Most recent treatment: Dose planned: 200 cGy (fraction 26 on 10/31/2022)   Total: Dose planned: 6,000 cGy (30 fractions)   Elapsed Days: 35      Reference Points   [id number]   Most recent treatment: Dose given: 200 cGy (on 10/31/2022)   Total: Dose given: 5,200 cGy   Elapsed Days: 35         Historical   No historical radiation treatments to show.            Plan      Plan:   Patient was seen today for an on treatment visit. Patient is receiving radiation therapy to the right lung. Patient is stable  and tolerating radiation therapy well with minimal side effects. Continue with radiation therapy.     I have reviewed treatment setup notes, checked and approved the daily guidance images. I reviewed dose delivery, treatment parameters and deemed them appropriate. We plan to continue radiation therapy as prescribed.       Electronically signed by Arsenio Spain MD, 11/01/22, 11:17 AM EDT.

## 2022-11-02 PROCEDURE — 77386: CPT | Performed by: RADIOLOGY

## 2022-11-02 PROCEDURE — 77014 CHG CT GUIDANCE RADIATION THERAPY FLDS PLACEMENT: CPT | Performed by: RADIOLOGY

## 2022-11-03 PROCEDURE — 77386: CPT | Performed by: RADIOLOGY

## 2022-11-03 PROCEDURE — 77014 CHG CT GUIDANCE RADIATION THERAPY FLDS PLACEMENT: CPT | Performed by: RADIOLOGY

## 2022-11-04 ENCOUNTER — OFFICE VISIT (OUTPATIENT)
Dept: ONCOLOGY | Facility: CLINIC | Age: 67
End: 2022-11-04

## 2022-11-04 ENCOUNTER — LAB (OUTPATIENT)
Dept: ONCOLOGY | Facility: HOSPITAL | Age: 67
End: 2022-11-04

## 2022-11-04 ENCOUNTER — TRANSCRIBE ORDERS (OUTPATIENT)
Dept: ONCOLOGY | Facility: HOSPITAL | Age: 67
End: 2022-11-04

## 2022-11-04 ENCOUNTER — INFUSION (OUTPATIENT)
Dept: ONCOLOGY | Facility: HOSPITAL | Age: 67
End: 2022-11-04

## 2022-11-04 VITALS
HEART RATE: 76 BPM | TEMPERATURE: 96.4 F | BODY MASS INDEX: 18.42 KG/M2 | WEIGHT: 107.3 LBS | DIASTOLIC BLOOD PRESSURE: 53 MMHG | OXYGEN SATURATION: 98 % | RESPIRATION RATE: 18 BRPM | SYSTOLIC BLOOD PRESSURE: 80 MMHG

## 2022-11-04 VITALS
TEMPERATURE: 96.4 F | BODY MASS INDEX: 18.42 KG/M2 | RESPIRATION RATE: 18 BRPM | WEIGHT: 107.3 LBS | SYSTOLIC BLOOD PRESSURE: 80 MMHG | OXYGEN SATURATION: 98 % | HEART RATE: 76 BPM | DIASTOLIC BLOOD PRESSURE: 53 MMHG

## 2022-11-04 VITALS
BODY MASS INDEX: 18.42 KG/M2 | WEIGHT: 107.3 LBS | HEART RATE: 63 BPM | RESPIRATION RATE: 18 BRPM | DIASTOLIC BLOOD PRESSURE: 58 MMHG | OXYGEN SATURATION: 98 % | SYSTOLIC BLOOD PRESSURE: 122 MMHG | TEMPERATURE: 98 F

## 2022-11-04 DIAGNOSIS — R30.0 DYSURIA: Primary | ICD-10-CM

## 2022-11-04 DIAGNOSIS — C34.90 SMALL CELL LUNG CANCER: ICD-10-CM

## 2022-11-04 DIAGNOSIS — T45.1X5A CHEMOTHERAPY-INDUCED NEUTROPENIA: ICD-10-CM

## 2022-11-04 DIAGNOSIS — R11.0 NAUSEA: ICD-10-CM

## 2022-11-04 DIAGNOSIS — D69.6 THROMBOCYTOPENIA: ICD-10-CM

## 2022-11-04 DIAGNOSIS — Z01.89 LABORATORY TEST: Primary | ICD-10-CM

## 2022-11-04 DIAGNOSIS — E86.0 DEHYDRATION: ICD-10-CM

## 2022-11-04 DIAGNOSIS — D70.1 CHEMOTHERAPY-INDUCED NEUTROPENIA: ICD-10-CM

## 2022-11-04 DIAGNOSIS — Z95.828 PORT-A-CATH IN PLACE: ICD-10-CM

## 2022-11-04 DIAGNOSIS — G89.3 NEOPLASM RELATED PAIN: ICD-10-CM

## 2022-11-04 LAB
ABO GROUP BLD: NORMAL
ABO GROUP BLD: NORMAL
ALBUMIN SERPL-MCNC: 3.87 G/DL (ref 3.5–5.2)
ALBUMIN/GLOB SERPL: 1.7 G/DL
ALP SERPL-CCNC: 50 U/L (ref 39–117)
ALT SERPL W P-5'-P-CCNC: 11 U/L (ref 1–33)
ANION GAP SERPL CALCULATED.3IONS-SCNC: 12.8 MMOL/L (ref 5–15)
ANISOCYTOSIS BLD QL: ABNORMAL
AST SERPL-CCNC: 13 U/L (ref 1–32)
BACTERIA UR QL AUTO: ABNORMAL /HPF
BASOPHILS # BLD MANUAL: 0.01 10*3/MM3 (ref 0–0.2)
BASOPHILS NFR BLD MANUAL: 1 % (ref 0–1.5)
BILIRUB SERPL-MCNC: 0.5 MG/DL (ref 0–1.2)
BILIRUB UR QL STRIP: ABNORMAL
BLD GP AB SCN SERPL QL: NEGATIVE
BUN SERPL-MCNC: 34 MG/DL (ref 8–23)
BUN/CREAT SERPL: 39.5 (ref 7–25)
CALCIUM SPEC-SCNC: 9.3 MG/DL (ref 8.6–10.5)
CHLORIDE SERPL-SCNC: 99 MMOL/L (ref 98–107)
CLARITY UR: ABNORMAL
CO2 SERPL-SCNC: 25.2 MMOL/L (ref 22–29)
COLOR UR: ABNORMAL
CREAT SERPL-MCNC: 0.86 MG/DL (ref 0.57–1)
DEPRECATED RDW RBC AUTO: 55.6 FL (ref 37–54)
EGFRCR SERPLBLD CKD-EPI 2021: 75.1 ML/MIN/1.73
ERYTHROCYTE [DISTWIDTH] IN BLOOD BY AUTOMATED COUNT: 16.6 % (ref 12.3–15.4)
GLOBULIN UR ELPH-MCNC: 2.2 GM/DL
GLUCOSE SERPL-MCNC: 122 MG/DL (ref 65–99)
GLUCOSE UR STRIP-MCNC: NEGATIVE MG/DL
HCT VFR BLD AUTO: 26.2 % (ref 34–46.6)
HGB BLD-MCNC: 8.9 G/DL (ref 12–15.9)
HGB UR QL STRIP.AUTO: NEGATIVE
HYALINE CASTS UR QL AUTO: ABNORMAL /LPF
HYPOCHROMIA BLD QL: ABNORMAL
KETONES UR QL STRIP: ABNORMAL
LEUKOCYTE ESTERASE UR QL STRIP.AUTO: ABNORMAL
LYMPHOCYTES # BLD MANUAL: 0.16 10*3/MM3 (ref 0.7–3.1)
LYMPHOCYTES NFR BLD MANUAL: 1 % (ref 5–12)
MCH RBC QN AUTO: 31.3 PG (ref 26.6–33)
MCHC RBC AUTO-ENTMCNC: 34 G/DL (ref 31.5–35.7)
MCV RBC AUTO: 92.3 FL (ref 79–97)
MONOCYTES # BLD: 0.01 10*3/MM3 (ref 0.1–0.9)
NEUTROPHILS # BLD AUTO: 0.59 10*3/MM3 (ref 1.7–7)
NEUTROPHILS NFR BLD MANUAL: 77 % (ref 42.7–76)
NITRITE UR QL STRIP: POSITIVE
PH UR STRIP.AUTO: 6 [PH] (ref 5–8)
PLATELET # BLD AUTO: 25 10*3/MM3 (ref 140–450)
PLATELET # BLD AUTO: 75 10*3/MM3 (ref 140–450)
PMV BLD AUTO: 8.7 FL (ref 6–12)
POTASSIUM SERPL-SCNC: 3.8 MMOL/L (ref 3.5–5.2)
PROT SERPL-MCNC: 6.1 G/DL (ref 6–8.5)
PROT UR QL STRIP: ABNORMAL
RBC # BLD AUTO: 2.84 10*6/MM3 (ref 3.77–5.28)
RBC # UR STRIP: ABNORMAL /HPF
REF LAB TEST METHOD: ABNORMAL
RH BLD: POSITIVE
RH BLD: POSITIVE
SCAN SLIDE: NORMAL
SMALL PLATELETS BLD QL SMEAR: ABNORMAL
SODIUM SERPL-SCNC: 137 MMOL/L (ref 136–145)
SP GR UR STRIP: >1.03 (ref 1–1.03)
SQUAMOUS #/AREA URNS HPF: ABNORMAL /HPF
T&S EXPIRATION DATE: NORMAL
UROBILINOGEN UR QL STRIP: ABNORMAL
VARIANT LYMPHS NFR BLD MANUAL: 21 % (ref 19.6–45.3)
WBC # UR STRIP: ABNORMAL /HPF
WBC NRBC COR # BLD: 0.77 10*3/MM3 (ref 3.4–10.8)

## 2022-11-04 PROCEDURE — 96360 HYDRATION IV INFUSION INIT: CPT

## 2022-11-04 PROCEDURE — 36430 TRANSFUSION BLD/BLD COMPNT: CPT

## 2022-11-04 PROCEDURE — 85025 COMPLETE CBC W/AUTO DIFF WBC: CPT | Performed by: INTERNAL MEDICINE

## 2022-11-04 PROCEDURE — 25010000002 HEPARIN LOCK FLUSH PER 10 UNITS: Performed by: INTERNAL MEDICINE

## 2022-11-04 PROCEDURE — 86900 BLOOD TYPING SEROLOGIC ABO: CPT

## 2022-11-04 PROCEDURE — 85049 AUTOMATED PLATELET COUNT: CPT

## 2022-11-04 PROCEDURE — 63710000001 DIPHENHYDRAMINE PER 50 MG: Performed by: NURSE PRACTITIONER

## 2022-11-04 PROCEDURE — 86901 BLOOD TYPING SEROLOGIC RH(D): CPT | Performed by: NURSE PRACTITIONER

## 2022-11-04 PROCEDURE — 86850 RBC ANTIBODY SCREEN: CPT | Performed by: NURSE PRACTITIONER

## 2022-11-04 PROCEDURE — 81001 URINALYSIS AUTO W/SCOPE: CPT | Performed by: NURSE PRACTITIONER

## 2022-11-04 PROCEDURE — 85007 BL SMEAR W/DIFF WBC COUNT: CPT | Performed by: INTERNAL MEDICINE

## 2022-11-04 PROCEDURE — 77014 CHG CT GUIDANCE RADIATION THERAPY FLDS PLACEMENT: CPT | Performed by: RADIOLOGY

## 2022-11-04 PROCEDURE — 86900 BLOOD TYPING SEROLOGIC ABO: CPT | Performed by: NURSE PRACTITIONER

## 2022-11-04 PROCEDURE — 87086 URINE CULTURE/COLONY COUNT: CPT | Performed by: NURSE PRACTITIONER

## 2022-11-04 PROCEDURE — 80053 COMPREHEN METABOLIC PANEL: CPT | Performed by: INTERNAL MEDICINE

## 2022-11-04 PROCEDURE — 99214 OFFICE O/P EST MOD 30 MIN: CPT | Performed by: NURSE PRACTITIONER

## 2022-11-04 PROCEDURE — 77386: CPT | Performed by: RADIOLOGY

## 2022-11-04 PROCEDURE — P9037 PLATE PHERES LEUKOREDU IRRAD: HCPCS

## 2022-11-04 PROCEDURE — 86901 BLOOD TYPING SEROLOGIC RH(D): CPT

## 2022-11-04 PROCEDURE — P9100 PATHOGEN TEST FOR PLATELETS: HCPCS

## 2022-11-04 RX ORDER — DIPHENHYDRAMINE HCL 25 MG
25 CAPSULE ORAL ONCE
Status: COMPLETED | OUTPATIENT
Start: 2022-11-04 | End: 2022-11-04

## 2022-11-04 RX ORDER — METOCLOPRAMIDE 10 MG/1
10 TABLET ORAL 4 TIMES DAILY
Qty: 60 TABLET | Refills: 1 | Status: SHIPPED | OUTPATIENT
Start: 2022-11-04

## 2022-11-04 RX ORDER — SODIUM CHLORIDE 9 MG/ML
250 INJECTION, SOLUTION INTRAVENOUS AS NEEDED
Status: DISCONTINUED | OUTPATIENT
Start: 2022-11-04 | End: 2022-11-04 | Stop reason: HOSPADM

## 2022-11-04 RX ORDER — ACETAMINOPHEN 325 MG/1
650 TABLET ORAL ONCE
Status: CANCELLED | OUTPATIENT
Start: 2022-11-04 | End: 2022-11-04

## 2022-11-04 RX ORDER — LEVOFLOXACIN 500 MG/1
500 TABLET, FILM COATED ORAL DAILY
Qty: 10 TABLET | Refills: 0 | Status: SHIPPED | OUTPATIENT
Start: 2022-11-04 | End: 2022-11-14

## 2022-11-04 RX ORDER — ONDANSETRON 8 MG/1
8 TABLET, ORALLY DISINTEGRATING ORAL EVERY 8 HOURS PRN
Qty: 30 TABLET | Refills: 1 | Status: SHIPPED | OUTPATIENT
Start: 2022-11-04

## 2022-11-04 RX ORDER — ACETAMINOPHEN 325 MG/1
650 TABLET ORAL ONCE
Status: DISCONTINUED | OUTPATIENT
Start: 2022-11-04 | End: 2022-11-04 | Stop reason: HOSPADM

## 2022-11-04 RX ORDER — SODIUM CHLORIDE 9 MG/ML
250 INJECTION, SOLUTION INTRAVENOUS AS NEEDED
Status: CANCELLED | OUTPATIENT
Start: 2022-11-04

## 2022-11-04 RX ORDER — SODIUM CHLORIDE 0.9 % (FLUSH) 0.9 %
10 SYRINGE (ML) INJECTION AS NEEDED
Status: CANCELLED | OUTPATIENT
Start: 2022-11-11

## 2022-11-04 RX ORDER — HEPARIN SODIUM (PORCINE) LOCK FLUSH IV SOLN 100 UNIT/ML 100 UNIT/ML
500 SOLUTION INTRAVENOUS AS NEEDED
Status: DISCONTINUED | OUTPATIENT
Start: 2022-11-04 | End: 2022-11-04 | Stop reason: HOSPADM

## 2022-11-04 RX ORDER — HEPARIN SODIUM (PORCINE) LOCK FLUSH IV SOLN 100 UNIT/ML 100 UNIT/ML
500 SOLUTION INTRAVENOUS AS NEEDED
Status: CANCELLED | OUTPATIENT
Start: 2022-11-11

## 2022-11-04 RX ORDER — ACETAMINOPHEN 325 MG/1
650 TABLET ORAL ONCE
Status: COMPLETED | OUTPATIENT
Start: 2022-11-04 | End: 2022-11-04

## 2022-11-04 RX ORDER — DIPHENHYDRAMINE HCL 25 MG
25 CAPSULE ORAL ONCE
Status: CANCELLED | OUTPATIENT
Start: 2022-11-04 | End: 2022-11-04

## 2022-11-04 RX ORDER — SODIUM CHLORIDE 0.9 % (FLUSH) 0.9 %
10 SYRINGE (ML) INJECTION AS NEEDED
Status: DISCONTINUED | OUTPATIENT
Start: 2022-11-04 | End: 2022-11-04 | Stop reason: HOSPADM

## 2022-11-04 RX ADMIN — ACETAMINOPHEN 650 MG: 325 TABLET, FILM COATED ORAL at 14:00

## 2022-11-04 RX ADMIN — Medication 500 UNITS: at 16:30

## 2022-11-04 RX ADMIN — SODIUM CHLORIDE 1000 ML: 9 INJECTION, SOLUTION INTRAVENOUS at 11:59

## 2022-11-04 RX ADMIN — DIPHENHYDRAMINE HYDROCHLORIDE 25 MG: 25 CAPSULE ORAL at 14:00

## 2022-11-04 RX ADMIN — Medication 10 ML: at 16:30

## 2022-11-04 NOTE — PROGRESS NOTES
NAME: Serenity Villegas    : 1955    DATE:  2022     DIAGNOSIS:  Limited Stage Small Cell Lung Cancer     CHIEF COMPLAINT:  Follow up SCL/Toxicity check    TREATMENT:  1.        2. Radiation on Right Lung with Dr. Spain  Radiation Treatments      Active   Plans   [id number]   Most recent treatment: Dose planned: 200 cGy (fraction 12 on 10/11/2022)   Total: Dose planned: 6,000 cGy (30 fractions)   Elapsed Days: 15      Reference Points   [id number]   Most recent treatment: Dose given: 200 cGy (on 10/11/2022)   Total: Dose given: 2,400 cGy   Elapsed Days: 15                  HISTORY OF PRESENT ILLNESS:   Serenity Villegas is a very pleasant 67 y.o. female who is being seen today in the presence of her son at the request of Dr. Moy Murrieta for evaluation and treatment of newly diagnosed lung cancer.  Ms. Villegas is quite anxious today.  She says that s she initially began to feel poorly approximately 6 weeks ago.  She started to feel short of breath, initially at night, but then progressively worse during the day as well, and says she noticed a rattling sensation in her chest.  She denies any chest pain, but says that she noticed her activity began to be affected.  She would feel very short of breath with minimal exertion and started to have trouble picking up her 2-year-old grandson.  Her son took her to a new primary care physician who promptly ordered CT imaging which was abnormal.  This led to her seeing Dr. Murrieta, and she underwent bronchoscopy on 2022.  Biopsies and BAL from the RML as well as 12 R and 7 lymph node stations all revealed small cell lung cancer.  PET/CT done 2022 revealed a central masslike process involving the right hilum centered around the right middle lobe mainstem bronchus which was approximately 4.3 cm in dimension (SUV 7.8) as well as a right suprahilar lymph node which was 2.5 cm in diameter with mass-effect on the right mainstem bronchus.  There was also an 11 mm  nodule in the right lower lobe which did not show FDG hypermetabolism.  There was no evidence of distant metastatic disease.  She was referred here for further evaluation and treatment.    Ms. Villegas notes that she has lost approximately 4 pounds over the last several weeks.  Her appetite has been decent, but she does notice that she has mild nausea over the last several weeks.  She occasionally vomits, but not routinely.  She denies significant chest pain, but does occasionally get twinges of pain.  She complains of shortness of breath especially with activity and cough productive of clear sputum.  She denies abdominal pain.  She denies diarrhea or constipation.  She chronically gets headaches (maybe once to twice a month).  This is typical for her and she has a history of what sounds like classical migraine which has been going on for several years.  These headaches are not changed.  She denies neurologic symptoms.    INTERVAL HISTORY:  Ms. Villegas presents today for follow up of SCL cancer and toxicity check. She completed her third cycle of Cisplatin/Etoposide on 10/28/2022 and completed XRT today. She reports today that she has not been feeling well. She reports dizziness with position changes. She reports having a difficult time eating and hydrating well. She reports intermittent episodes of nausea and has been taking her son's reglan which she finds helpful. She also takes Nexium 40 mg BID. She did not restart Marinol 2.5 mg BID as advised. Her pain is well controlled with Oxycodone as needed. She reports dark/foul smelling urine with burning when she urinates. She denies fever/chills. Denies any abnormal bleeding from any source. She denies any other specific complaints today.       PAST MEDICAL HISTORY:  Past Medical History:   Diagnosis Date   • Arthritis    • Cancer (HCC)    • Coronary artery disease    • Heart disease    • Heartburn    • Hyperlipidemia    • Hypertension    • Right lower lobe lung mass   "      PAST SURGICAL HISTORY:  Past Surgical History:   Procedure Laterality Date   • BRONCHOSCOPY N/A 2022    Procedure: BRONCHOSCOPY WITH ENDOBRONCHIAL ULTRASOUND;  Surgeon: Amari Murrieta MD;  Location:  [facility];  Service: Pulmonary;  Laterality: N/A;   • CARDIAC SURGERY      3 VESELS  2002   [facility]   • HYSTERECTOMY      arh   • PACEMAKER IMPLANTATION      [facility]   • VENOUS ACCESS DEVICE (PORT) INSERTION N/A 2022    Procedure: INSERTION VENOUS ACCESS DEVICE;  Surgeon: Ting Sarah MD;  Location:  [facility] OR;  Service: General;  Laterality: N/A;   Hysterectomy performed for endometriosis (benign) \"a long time ago\"    FAMILY HISTORY:  Family History   Problem Relation Age of Onset   • Cancer Mother    • Heart disease Father    • Heart attack Father    Mother  with melanoma  Maternal grandmother  of unknown type malignancy    SOCIAL HISTORY:  Social History     Socioeconomic History   • Marital status:    Tobacco Use   • Smoking status: Every Day     Packs/day: 0.25     Years: 20.00     Pack years: 5.00     Types: Cigarettes   • Smokeless tobacco: Never   • Tobacco comments:     1  pack per day history   Vaping Use   • Vaping Use: Never used   Substance and Sexual Activity   • Alcohol use: Never   • Drug use: Never   • Sexual activity: Defer   Ms. Villegas lives alone.  Her son accompanies her to her visit today and is very supportive.  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  No unusual chemical exposures.  She is a smoker and reports she currently smokes about half pack per day      REVIEW OF SYSTEMS:   A comprehensive 14 point review of systems was performed.  Significant findings as mentioned above.  All other systems reviewed and are negative.      MEDICATIONS:  The current medication list was reviewed in the EMR    Current Outpatient Medications:   •  albuterol sulfate  (90 Base) MCG/ACT inhaler, , Disp: , Rfl:   •  allopurinol (ZYLOPRIM) 300 " MG tablet, Take 1 tablet by mouth Daily., Disp: 30 tablet, Rfl: 3  •  Anoro Ellipta 62.5-25 MCG/INH aerosol powder  inhaler, , Disp: , Rfl:   •  atenolol (TENORMIN) 50 MG tablet, , Disp: , Rfl:   •  atorvastatin (LIPITOR) 40 MG tablet, , Disp: , Rfl:   •  benazepril (LOTENSIN) 20 MG tablet, , Disp: , Rfl:   •  Cyanocobalamin (Vitamin B-12) 500 MCG sublingual tablet, Place 500 mcg under the tongue Daily., Disp: 30 tablet, Rfl: 5  •  Diclofenac Sodium (VOLTAREN) 1 % gel gel, , Disp: , Rfl:   •  dronabinol (Marinol) 2.5 MG capsule, Take 1 capsule by mouth 2 (Two) Times a Day Before Meals., Disp: 60 capsule, Rfl: 0  •  escitalopram (LEXAPRO) 10 MG tablet, Take 1 tablet by mouth Daily., Disp: 30 tablet, Rfl: 1  •  esomeprazole (nexIUM) 40 MG capsule, Take 1 capsule by mouth 2 (Two) Times a Day., Disp: 60 capsule, Rfl: 3  •  levocetirizine (XYZAL) 5 MG tablet, , Disp: , Rfl:   •  levoFLOXacin (Levaquin) 500 MG tablet, Take 1 tablet by mouth Daily for 10 days., Disp: 10 tablet, Rfl: 0  •  lidocaine-prilocaine (EMLA) 2.5-2.5 % cream, Apply to Port-A-Cath site 30 minutes prior to arrival at infusion clinic., Disp: 30 g, Rfl: 1  •  loratadine (CLARITIN) 10 MG tablet, Take 1 tablet by mouth Daily., Disp: 30 tablet, Rfl: 1  •  magic mouthwash oral suspension, Swish and swallow 10 mL Every 4 (Four) Hours As Needed for Mucositis., Disp: 1 each, Rfl: 3  •  metoclopramide (REGLAN) 10 MG tablet, Take 1 tablet by mouth 4 (Four) Times a Day., Disp: 60 tablet, Rfl: 1  •  montelukast (SINGULAIR) 10 MG tablet, , Disp: , Rfl:   •  OLANZapine (zyPREXA) 5 MG tablet, Take one tablet at HS on days 2, 3 and 4 after chemotherapy., Disp: 3 tablet, Rfl: 4  •  ondansetron (ZOFRAN) 8 MG tablet, Take 1 tablet by mouth Every 8 (Eight) Hours As Needed for Nausea or Vomiting., Disp: 30 tablet, Rfl: 1  •  ondansetron ODT (Zofran ODT) 8 MG disintegrating tablet, Place 1 tablet on the tongue Every 8 (Eight) Hours As Needed for Nausea or Vomiting., Disp:  30 tablet, Rfl: 1  •  oxyCODONE (Roxicodone) 5 MG immediate release tablet, Take 1 to 3 tablets by mouth every 4 hours as needed for pain., Disp: 180 tablet, Rfl: 0  •  sennosides-docusate (senna-docusate sodium) 8.6-50 MG per tablet, Take 2 tablets by mouth 2 (Two) Times a Day., Disp: 120 tablet, Rfl: 0  No current facility-administered medications for this visit.    Facility-Administered Medications Ordered in Other Visits:   •  acetaminophen (TYLENOL) tablet 650 mg, 650 mg, Oral, Once, Gudelia Ambriz, APRN  •  diphenhydrAMINE (BENADRYL) capsule 25 mg, 25 mg, Oral, Once, Gudelia Ambriz, APRN  •  sodium chloride 0.9 % bolus 1,000 mL, 1,000 mL, Intravenous, Once, Gudelia Ambriz APRN, Last Rate: 1,000 mL/hr at 11/04/22 1159, 1,000 mL at 11/04/22 1159  •  sodium chloride 0.9 % infusion 250 mL, 250 mL, Intravenous, PRN, Gudelia Ambriz, APRN    ALLERGIES:  No Known Allergies    PHYSICAL EXAM:  Vitals:    11/04/22 1103   BP: (!) 80/53   Pulse: 76   Resp: 18   Temp: 96.4 °F (35.8 °C)   TempSrc: Temporal   SpO2: 98%   Weight: 48.7 kg (107 lb 4.8 oz)   PainSc: 0-No pain         General:  Awake, alert and oriented.  Appears fatigued but not toxic.  HEENT:  Pupils are equal, round and reactive to light and accommodation, Extra-ocular movements full, Oropharyx clear, mucous membranes dry  Neck:  No JVD, thyromegaly or lymphadenopathy  CV:  Regular rate and rhythm, no murmurs, rubs or gallops  Resp: Lungs are clear to auscultation bilaterally, no wheezing  Abd:  Soft, non-tender, non-distended, bowel sounds present, no organomegaly or masses  Ext:  No clubbing, cyanosis or edema bilaterally.    Lymph:  No cervical, supraclavicular, axillary adenopathy.  Neuro:  MS as above, grossly nonfocal exam.    PATHOLOGY:    08/22/2022            ENDOSCOPY:    Bronchoscopy: Dr. Murrieta 8/22/2022  Operation: Flexible fiberoptic bronchoscopy     Findings: Tumor emanating from the right middle lobe     Specimen(s):  Endobronchial biopsies right middle lobe.  BAL right middle lobe.  Cytology brush right middle lobe.  Transbronchial needle aspiration station 12 R and station 7     Estimated Blood Loss: Minimal/None     Complications: No immediate      IMAGING:    CT Chest with Contrast 08/10/22      NM PET/CT Skull Base to Mid Thigh (08/24/2022 11:30)  FINDINGS:      HEAD:    Brain:  Unremarkable as visualized.    Nasopharynx:  Unremarkable.      NECK:    Hypopharynx:  Unremarkable.    Larynx:  Unremarkable.    Trachea:  Unremarkable.    Retropharyngeal space:  Unremarkable.    Submandibular/parotid glands:  Unremarkable.  Glands are normal in  size.    Thyroid:  Unremarkable.  No enlarged or calcified nodules.      CHEST:    Lungs:  Central masslike process involving the right hilum centered  around the right middle lobe main stem bronchus that may represent a  sharath conglomeration or a primary mass and is 4.3 cm. FDG  hypermetabolism is noted with maximum SUV: 7.8.  11 mm nodule right  lower lobe shows no FDG hypermetabolism.  Emphysema is noted.    Pleural space:  Unremarkable.  No significant effusion.  No  pneumothorax.    Heart:  Cardiomegaly.  CABG.  No significant pericardial effusion.    Mediastinum:  Unremarkable.  No mass.      ABDOMEN:    Liver:  Unremarkable.    Gallbladder and bile ducts:  Unremarkable.  No calcified stones.  No  ductal dilation.    Pancreas:  Unremarkable.  No ductal dilation.    Spleen:  Unremarkable.  No splenomegaly.    Adrenals:  Unremarkable.  No mass.    Kidneys and ureters:  Atrophy right kidney.  Compensatory hypertrophy  left kidney.  Nonobstructing left kidney stones.    Stomach and bowel:  Sigmoid diverticulosis without evidence of acute  diverticulitis.  No obstruction.      PELVIS:    Appendix:  No findings to suggest acute appendicitis.    Bladder:  Unremarkable.    Reproductive:  Unremarkable as visualized.      WHOLE BODY:    Intraperitoneal space:  Unremarkable.  No significant  fluid  collection.  No free air.    Bones/joints:  Unremarkable.  No acute fracture.  No dislocation.    Soft tissues:  Unremarkable.    Vasculature:  Advanced atherosclerosis of the aortoiliac vasculature.   No aortic aneurysm.    Lymph nodes:  Enlarged right suprahilar lymph node or primary mass is  noted that is approximately 2.5 cm and produces mass effect on the right  mainstem bronchus. FDG hypermetabolism is noted. Maximum SUV: 8.8.     IMPRESSION:  1.  Central masslike process involving the right hilum centered around  the right middle lobe main stem bronchus that may represent a sharath  conglomeration or a primary mass and is 4.3 cm. FDG hypermetabolism is  noted with maximum SUV: 7.8.  2.  Enlarged right suprahilar lymph node or primary mass is noted that  is approximately 2.5 cm and produces mass effect on the right mainstem  bronchus. FDG hypermetabolism is noted. Maximum SUV: 8.8.  3.  11 mm nodule right lower lobe shows no FDG hypermetabolism.  4. Other incidental and nonacute findings as above.    CT Head With Contrast (09/09/2022 12:10)  FINDINGS:   No acute intracranial abnormality. No midline shift or mass  effect. No hydrocephalus or intracranial hemorrhage. There is no CT  evidence of acute vascular territory infarct.  No pathologic contrast  enhancement identified. No enhancing extra-axial fluid collections. No  enhancing brain parenchymal lesions. Bone windows show no acute osseous  abnormality.     IMPRESSION:  1. No acute intracranial abnormality.  2. No pathologic contrast enhancement.        RECENT LABS:  Lab Results   Component Value Date    WBC 0.77 (C) 11/04/2022    HGB 8.9 (L) 11/04/2022    HCT 26.2 (L) 11/04/2022    MCV 92.3 11/04/2022    RDW 16.6 (H) 11/04/2022    PLT 25 (C) 11/04/2022    NEUTRORELPCT 89.5 (H) 10/28/2022    LYMPHORELPCT 1.6 (L) 10/28/2022    MONORELPCT 8.4 10/28/2022    EOSRELPCT 0.0 (L) 10/28/2022    BASORELPCT 0.1 10/28/2022    NEUTROABS 0.59 (L) 11/04/2022     LYMPHSABS 0.16 (L) 10/28/2022       Lab Results   Component Value Date     11/04/2022    K 3.8 11/04/2022    CO2 25.2 11/04/2022    CL 99 11/04/2022    BUN 34 (H) 11/04/2022    CREATININE 0.86 11/04/2022    GLUCOSE 122 (H) 11/04/2022    CALCIUM 9.3 11/04/2022    ALKPHOS 50 11/04/2022    AST 13 11/04/2022    ALT 11 11/04/2022    BILITOT 0.5 11/04/2022    ALBUMIN 3.87 11/04/2022    PROTEINTOT 6.1 11/04/2022    MG 2.1 10/28/2022     Lab Results   Component Value Date    FERRITIN 438.50 (H) 10/19/2022    IRON 54 10/19/2022    TIBC 270 (L) 10/19/2022    LABIRON 20 10/19/2022    QGHQFEAD00 1,220 (H) 10/19/2022    FOLATE 9.98 10/19/2022     Lab Results   Component Value Date    TSH 0.638 10/19/2022     LDH   Date Value Ref Range Status   09/14/2022 264 (H) 135 - 214 U/L Final     Uric Acid   Date Value Ref Range Status   09/14/2022 2.1 (L) 2.4 - 5.7 mg/dL Final         ASSESSMENT & PLAN:  Serenity Villegas is a very pleasant 67 y.o. female with newly diagnosed limited stage small cell lung cancer.    1.  Limited stage small cell lung cancer:  - Dr. Wood recommended concurrent chemotherapy and radiation with cisplatin and etoposide.  We discussed potential risks benefits and side effects and she decided to proceed.  - She has completed 3 cycles of Cis/Etoposide to date and completed concurrent XRT today.  - She has struggled with decreased PO intake due to esophageal toxicity from radiation (see below).  - She will follow up with MD as scheduled. Strongly encouraged supportive care as needed.     2.  Weight loss / poor po intake / esophageal pain/ nausea:  - Continue oxycodone 5 mg 1-3 tabs q4h as needed to control pain. Continue to use magic mouthwash as needed.    - Advised to try to restart Marinol 2.5 mg BID to help with appetite and nausea.   - Continue Nexium at 40 mg twice daily.  - Will give one liter NS today.  - She has been taking her son's Reglan at home which has been helpful with nausea. RX provided  today for Reglan, advised to discontinue Compazine. Also provided Zofran ODT as needed.     3. Thrombocytopenia/ Neutropenia:  - Platelet count 25,000. Denies any active/abnormal bleeding. Will transfuse 1 unit irradiated platelets.   - ANC 0.59 today. Denies fever/chills. RX provided for Levaquin prophylaxis.  - Will repeat CBC/CMP on 11/09/2022.    4. Dysuria/Foul Smelling Urine:  - Obtained UA, culture is pending. RX provided today for Levaquin as above. Will follow up with pending culture.     5.  Prophylaxis:  - She did not have 2021 influenza vaccine. Recommended 2022 flu vaccine.   - Received Prevnar 20 vaccine  - She has not had COVID vaccinations. Recommended that she get this completed.  Could also consider Evusheld treatment.    6.  Follow-up:  - Continue supportive care as needed.   - One liter NS today. Transfuse 1 unit irradiated platelets.  - RX provided for Reglan and Zofran ODT.  - Start Levaquin prophylaxis.  - Restart Marinol 2.5 mg BID.  - Continue oxycodone as needed.   - Recheck CBCD/CMP on 11/09/2022.  - With MD as scheduled with CBCD/CMP    ACO / BALJIT/Other  Quality measures  -  Serenity Villegas did not receive 2022 flu vaccine. This was recommended, will wait until counts recover.  -  Serenity Villegas reports a pain score of 0.    -  Current outpatient and discharge medications have been reconciled for the patient.  Reviewed by: DINORAH Tucker          I spent 30 minutes with Serenity Villegas today.  In the office today, more than 50% of this time was spent face-to-face with her  in counseling / coordination of care, reviewing her medical history and counseling on the current treatment plan.  All questions were answered to her satisfaction      Electronically Signed by: DINORAH Tucker      CC:     DO Amari Russell MD Weisi Yan, MD

## 2022-11-05 LAB
BACTERIA SPEC AEROBE CULT: NO GROWTH
BH BB BLOOD EXPIRATION DATE: NORMAL
BH BB BLOOD TYPE BARCODE: 6200
BH BB DISPENSE STATUS: NORMAL
BH BB PRODUCT CODE: NORMAL
BH BB UNIT NUMBER: NORMAL
UNIT  ABO: NORMAL
UNIT  RH: NORMAL

## 2022-11-07 LAB
RAD ONC ARIA COURSE END DATE: NORMAL
RAD ONC ARIA COURSE ID: NORMAL
RAD ONC ARIA COURSE INTENT: NORMAL
RAD ONC ARIA COURSE LAST TREATMENT DATE: NORMAL
RAD ONC ARIA COURSE START DATE: NORMAL
RAD ONC ARIA COURSE TREATMENT ELAPSED DAYS: 39
RAD ONC ARIA FIRST TREATMENT DATE: NORMAL
RAD ONC ARIA PLAN FRACTIONS TREATED TO DATE: 30
RAD ONC ARIA PLAN ID: NORMAL
RAD ONC ARIA PLAN NAME: NORMAL
RAD ONC ARIA PLAN PRESCRIBED DOSE PER FRACTION: 2 GY
RAD ONC ARIA PLAN PRIMARY REFERENCE POINT: NORMAL
RAD ONC ARIA PLAN TOTAL FRACTIONS PRESCRIBED: 30
RAD ONC ARIA PLAN TOTAL PRESCRIBED DOSE: 6000 CGY
RAD ONC ARIA REFERENCE POINT DOSAGE GIVEN TO DATE: 60 GY
RAD ONC ARIA REFERENCE POINT ID: NORMAL

## 2022-11-09 ENCOUNTER — LAB (OUTPATIENT)
Dept: ONCOLOGY | Facility: HOSPITAL | Age: 67
End: 2022-11-09

## 2022-11-09 ENCOUNTER — OFFICE VISIT (OUTPATIENT)
Dept: ONCOLOGY | Facility: CLINIC | Age: 67
End: 2022-11-09

## 2022-11-09 ENCOUNTER — INFUSION (OUTPATIENT)
Dept: ONCOLOGY | Facility: HOSPITAL | Age: 67
End: 2022-11-09

## 2022-11-09 VITALS
DIASTOLIC BLOOD PRESSURE: 74 MMHG | SYSTOLIC BLOOD PRESSURE: 154 MMHG | TEMPERATURE: 99.8 F | HEART RATE: 82 BPM | RESPIRATION RATE: 18 BRPM

## 2022-11-09 VITALS
HEART RATE: 110 BPM | WEIGHT: 106 LBS | RESPIRATION RATE: 18 BRPM | TEMPERATURE: 97.5 F | OXYGEN SATURATION: 98 % | DIASTOLIC BLOOD PRESSURE: 77 MMHG | SYSTOLIC BLOOD PRESSURE: 115 MMHG | BODY MASS INDEX: 18.19 KG/M2

## 2022-11-09 VITALS
TEMPERATURE: 97.5 F | HEART RATE: 110 BPM | DIASTOLIC BLOOD PRESSURE: 77 MMHG | SYSTOLIC BLOOD PRESSURE: 115 MMHG | BODY MASS INDEX: 18.19 KG/M2 | OXYGEN SATURATION: 98 % | RESPIRATION RATE: 18 BRPM | WEIGHT: 106 LBS

## 2022-11-09 DIAGNOSIS — D61.818 PANCYTOPENIA: ICD-10-CM

## 2022-11-09 DIAGNOSIS — D69.6 THROMBOCYTOPENIA: ICD-10-CM

## 2022-11-09 DIAGNOSIS — C34.90 SMALL CELL LUNG CANCER: ICD-10-CM

## 2022-11-09 DIAGNOSIS — T45.1X5A CHEMOTHERAPY-INDUCED NEUTROPENIA: ICD-10-CM

## 2022-11-09 DIAGNOSIS — R11.0 NAUSEA: ICD-10-CM

## 2022-11-09 DIAGNOSIS — D69.6 THROMBOCYTOPENIA: Primary | ICD-10-CM

## 2022-11-09 DIAGNOSIS — D70.1 CHEMOTHERAPY-INDUCED NEUTROPENIA: ICD-10-CM

## 2022-11-09 DIAGNOSIS — E87.6 HYPOKALEMIA: ICD-10-CM

## 2022-11-09 DIAGNOSIS — R63.0 POOR APPETITE: Primary | ICD-10-CM

## 2022-11-09 DIAGNOSIS — D70.9 NEUTROPENIA, UNSPECIFIED TYPE: ICD-10-CM

## 2022-11-09 DIAGNOSIS — E86.0 DEHYDRATION: ICD-10-CM

## 2022-11-09 LAB
ABO GROUP BLD: NORMAL
ALBUMIN SERPL-MCNC: 3.84 G/DL (ref 3.5–5.2)
ALBUMIN/GLOB SERPL: 1.5 G/DL
ALP SERPL-CCNC: 69 U/L (ref 39–117)
ALT SERPL W P-5'-P-CCNC: 9 U/L (ref 1–33)
ANION GAP SERPL CALCULATED.3IONS-SCNC: 18.7 MMOL/L (ref 5–15)
ANISOCYTOSIS BLD QL: ABNORMAL
AST SERPL-CCNC: 11 U/L (ref 1–32)
BILIRUB SERPL-MCNC: 0.7 MG/DL (ref 0–1.2)
BLD GP AB SCN SERPL QL: NEGATIVE
BUN SERPL-MCNC: 32 MG/DL (ref 8–23)
BUN/CREAT SERPL: 40 (ref 7–25)
CALCIUM SPEC-SCNC: 9.8 MG/DL (ref 8.6–10.5)
CHLORIDE SERPL-SCNC: 95 MMOL/L (ref 98–107)
CO2 SERPL-SCNC: 23.3 MMOL/L (ref 22–29)
CREAT SERPL-MCNC: 0.8 MG/DL (ref 0.57–1)
DEPRECATED RDW RBC AUTO: 52.9 FL (ref 37–54)
DOHLE BODIES: PRESENT
EGFRCR SERPLBLD CKD-EPI 2021: 81.9 ML/MIN/1.73
ERYTHROCYTE [DISTWIDTH] IN BLOOD BY AUTOMATED COUNT: 16.4 % (ref 12.3–15.4)
GLOBULIN UR ELPH-MCNC: 2.6 GM/DL
GLUCOSE SERPL-MCNC: 123 MG/DL (ref 65–99)
HCT VFR BLD AUTO: 22.9 % (ref 34–46.6)
HELMET CELLS: ABNORMAL
HGB BLD-MCNC: 8 G/DL (ref 12–15.9)
LYMPHOCYTES # BLD MANUAL: 0.09 10*3/MM3 (ref 0.7–3.1)
LYMPHOCYTES NFR BLD MANUAL: 52 % (ref 5–12)
MCH RBC QN AUTO: 31.7 PG (ref 26.6–33)
MCHC RBC AUTO-ENTMCNC: 34.9 G/DL (ref 31.5–35.7)
MCV RBC AUTO: 90.9 FL (ref 79–97)
METAMYELOCYTES NFR BLD MANUAL: 4 % (ref 0–0)
MONOCYTES # BLD: 0.33 10*3/MM3 (ref 0.1–0.9)
NEUTROPHILS # BLD AUTO: 0.19 10*3/MM3 (ref 1.7–7)
NEUTROPHILS NFR BLD MANUAL: 30 % (ref 42.7–76)
PLATELET # BLD AUTO: 15 10*3/MM3 (ref 140–450)
PLATELET # BLD AUTO: 58 10*3/MM3 (ref 140–450)
PMV BLD AUTO: 9.1 FL (ref 6–12)
POIKILOCYTOSIS BLD QL SMEAR: ABNORMAL
POLYCHROMASIA BLD QL SMEAR: ABNORMAL
POTASSIUM SERPL-SCNC: 3.1 MMOL/L (ref 3.5–5.2)
PROT SERPL-MCNC: 6.4 G/DL (ref 6–8.5)
RBC # BLD AUTO: 2.52 10*6/MM3 (ref 3.77–5.28)
RH BLD: POSITIVE
SCHISTOCYTES BLD QL SMEAR: ABNORMAL
SMALL PLATELETS BLD QL SMEAR: ABNORMAL
SODIUM SERPL-SCNC: 137 MMOL/L (ref 136–145)
T&S EXPIRATION DATE: NORMAL
TOXIC GRANULATION: ABNORMAL
VARIANT LYMPHS NFR BLD MANUAL: 14 % (ref 19.6–45.3)
WBC NRBC COR # BLD: 0.64 10*3/MM3 (ref 3.4–10.8)

## 2022-11-09 PROCEDURE — 85025 COMPLETE CBC W/AUTO DIFF WBC: CPT

## 2022-11-09 PROCEDURE — 85007 BL SMEAR W/DIFF WBC COUNT: CPT

## 2022-11-09 PROCEDURE — 96365 THER/PROPH/DIAG IV INF INIT: CPT

## 2022-11-09 PROCEDURE — 36430 TRANSFUSION BLD/BLD COMPNT: CPT

## 2022-11-09 PROCEDURE — 86901 BLOOD TYPING SEROLOGIC RH(D): CPT

## 2022-11-09 PROCEDURE — 25010000002 ONDANSETRON PER 1 MG: Performed by: NURSE PRACTITIONER

## 2022-11-09 PROCEDURE — P9037 PLATE PHERES LEUKOREDU IRRAD: HCPCS

## 2022-11-09 PROCEDURE — 86900 BLOOD TYPING SEROLOGIC ABO: CPT

## 2022-11-09 PROCEDURE — P9100 PATHOGEN TEST FOR PLATELETS: HCPCS

## 2022-11-09 PROCEDURE — 63710000001 DIPHENHYDRAMINE PER 50 MG: Performed by: NURSE PRACTITIONER

## 2022-11-09 PROCEDURE — 80053 COMPREHEN METABOLIC PANEL: CPT

## 2022-11-09 PROCEDURE — 86850 RBC ANTIBODY SCREEN: CPT

## 2022-11-09 PROCEDURE — 99214 OFFICE O/P EST MOD 30 MIN: CPT | Performed by: NURSE PRACTITIONER

## 2022-11-09 PROCEDURE — 96375 TX/PRO/DX INJ NEW DRUG ADDON: CPT

## 2022-11-09 PROCEDURE — 85049 AUTOMATED PLATELET COUNT: CPT | Performed by: NURSE PRACTITIONER

## 2022-11-09 PROCEDURE — 96360 HYDRATION IV INFUSION INIT: CPT

## 2022-11-09 RX ORDER — ACETAMINOPHEN 325 MG/1
650 TABLET ORAL ONCE
Status: CANCELLED | OUTPATIENT
Start: 2022-11-09 | End: 2022-11-09

## 2022-11-09 RX ORDER — DIPHENHYDRAMINE HCL 25 MG
25 CAPSULE ORAL ONCE
Status: COMPLETED | OUTPATIENT
Start: 2022-11-09 | End: 2022-11-09

## 2022-11-09 RX ORDER — ACETAMINOPHEN 325 MG/1
650 TABLET ORAL ONCE
Status: COMPLETED | OUTPATIENT
Start: 2022-11-09 | End: 2022-11-09

## 2022-11-09 RX ORDER — SODIUM CHLORIDE 9 MG/ML
250 INJECTION, SOLUTION INTRAVENOUS AS NEEDED
Status: DISCONTINUED | OUTPATIENT
Start: 2022-11-09 | End: 2022-11-09 | Stop reason: HOSPADM

## 2022-11-09 RX ORDER — DIPHENHYDRAMINE HCL 25 MG
25 CAPSULE ORAL ONCE
Status: DISCONTINUED | OUTPATIENT
Start: 2022-11-09 | End: 2022-11-09

## 2022-11-09 RX ORDER — SODIUM CHLORIDE 9 MG/ML
250 INJECTION, SOLUTION INTRAVENOUS AS NEEDED
Status: CANCELLED | OUTPATIENT
Start: 2022-11-09

## 2022-11-09 RX ORDER — POTASSIUM CHLORIDE 20 MEQ/1
20 TABLET, EXTENDED RELEASE ORAL 2 TIMES DAILY
Qty: 6 TABLET | Refills: 0 | Status: SHIPPED | OUTPATIENT
Start: 2022-11-09 | End: 2022-11-12

## 2022-11-09 RX ORDER — DIPHENHYDRAMINE HCL 25 MG
25 CAPSULE ORAL ONCE
Status: CANCELLED | OUTPATIENT
Start: 2022-11-09 | End: 2022-11-09

## 2022-11-09 RX ORDER — DRONABINOL 2.5 MG/1
2.5 CAPSULE ORAL
Qty: 60 CAPSULE | Refills: 0 | Status: SHIPPED | OUTPATIENT
Start: 2022-11-09

## 2022-11-09 RX ADMIN — ONDANSETRON 16 MG: 2 INJECTION INTRAMUSCULAR; INTRAVENOUS at 12:38

## 2022-11-09 RX ADMIN — ACETAMINOPHEN 650 MG: 325 TABLET, FILM COATED ORAL at 13:57

## 2022-11-09 RX ADMIN — DIPHENHYDRAMINE HYDROCHLORIDE 25 MG: 25 CAPSULE ORAL at 13:57

## 2022-11-09 RX ADMIN — SODIUM CHLORIDE 1000 ML: 9 INJECTION, SOLUTION INTRAVENOUS at 12:37

## 2022-11-09 NOTE — PROGRESS NOTES
DATE:  11/9/2022    DIAGNOSIS:  Limited Stage Small Cell Lung Cancer          CHIEF COMPLAINT:  Fatigue, weakness, poor oral intake, nausea with vomiting     Interval History:  Ms. Villegas is being seen today for an acute visit, accompanied by her son. She has been receiving concurrent chemotherapy and radiation with Cisplatin/Etoposide. She completed 30 fractions last week and completed cycle 3 on 10/28/22. She has been feeling poorly for the past couple of weeks. The patient tells me she is feeling fine. However, her son tells me this is not the case and he is worried about her. She reportedly has not been eating or drinking for the past ~5 days. She also has been having nausea with vomiting. She complains of fatigue and weakness. She was seen in follow up last week. Her son says he cannot get her to take her medications as advised. Therefore, she has not been taking zofran and reglan as previously prescribed. She was also advised to restart marinol which she has failed to do. Last week, she was found to be neutropenic and started on Levaquin for prophylaxis but she has not been taking it. She is currently afebrile and without infectious symptoms. She also had drop in platelet count and was transfused 1 unit of platelets with improvement. She denies having any active bleeding. She has noticed some bruising around her PAC. Denies swelling, redness or pain around PAC, neck or upper extremities. Her pain is controlled with oxycodone 5 mg prn (takes 1 tablet every ~4 hours). She has no other complaints at this time.     PAST MEDICAL HISTORY:  Past Medical History:   Diagnosis Date   • Arthritis    • Cancer (HCC)    • Coronary artery disease    • Heart disease    • Heartburn    • Hyperlipidemia    • Hypertension    • Right lower lobe lung mass        PAST SURGICAL HISTORY:  Past Surgical History:   Procedure Laterality Date   • BRONCHOSCOPY N/A 8/22/2022    Procedure: BRONCHOSCOPY WITH ENDOBRONCHIAL ULTRASOUND;   Surgeon: Amari Murrieta MD;  Location:  [facility];  Service: Pulmonary;  Laterality: N/A;   • CARDIAC SURGERY      3 VESELS 2002   [facility] [place]   • HYSTERECTOMY      arh   • PACEMAKER IMPLANTATION      [facility]   • VENOUS ACCESS DEVICE (PORT) INSERTION N/A 9/6/2022    Procedure: INSERTION VENOUS ACCESS DEVICE;  Surgeon: Ting Sarah MD;  Location:  [facility];  Service: General;  Laterality: N/A;       SOCIAL HISTORY:  Social History     Socioeconomic History   • Marital status:    Tobacco Use   • Smoking status: Every Day     Packs/day: 0.25     Years: 20.00     Pack years: 5.00     Types: Cigarettes   • Smokeless tobacco: Never   • Tobacco comments:     1  pack per day history   Vaping Use   • Vaping Use: Never used   Substance and Sexual Activity   • Alcohol use: Never   • Drug use: Never   • Sexual activity: Defer       FAMILY HISTORY:  Family History   Problem Relation Age of Onset   • Cancer Mother    • Heart disease Father    • Heart attack Father      MEDICATIONS:  The current medication list was reviewed in the EMR    Current Outpatient Medications:   •  albuterol sulfate  (90 Base) MCG/ACT inhaler, , Disp: , Rfl:   •  allopurinol (ZYLOPRIM) 300 MG tablet, Take 1 tablet by mouth Daily., Disp: 30 tablet, Rfl: 3  •  Anoro Ellipta 62.5-25 MCG/INH aerosol powder  inhaler, , Disp: , Rfl:   •  atenolol (TENORMIN) 50 MG tablet, , Disp: , Rfl:   •  atorvastatin (LIPITOR) 40 MG tablet, , Disp: , Rfl:   •  benazepril (LOTENSIN) 20 MG tablet, , Disp: , Rfl:   •  Cyanocobalamin (Vitamin B-12) 500 MCG sublingual tablet, Place 500 mcg under the tongue Daily., Disp: 30 tablet, Rfl: 5  •  Diclofenac Sodium (VOLTAREN) 1 % gel gel, , Disp: , Rfl:   •  dronabinol (Marinol) 2.5 MG capsule, Take 1 capsule by mouth 2 (Two) Times a Day Before Meals., Disp: 60 capsule, Rfl: 0  •  escitalopram (LEXAPRO) 10 MG tablet, Take 1 tablet by mouth Daily., Disp: 30 tablet, Rfl: 1  •  esomeprazole (nexIUM) 40  MG capsule, Take 1 capsule by mouth 2 (Two) Times a Day., Disp: 60 capsule, Rfl: 3  •  levocetirizine (XYZAL) 5 MG tablet, , Disp: , Rfl:   •  levoFLOXacin (Levaquin) 500 MG tablet, Take 1 tablet by mouth Daily for 10 days., Disp: 10 tablet, Rfl: 0  •  lidocaine-prilocaine (EMLA) 2.5-2.5 % cream, Apply to Port-A-Cath site 30 minutes prior to arrival at infusion clinic., Disp: 30 g, Rfl: 1  •  loratadine (CLARITIN) 10 MG tablet, Take 1 tablet by mouth Daily., Disp: 30 tablet, Rfl: 1  •  magic mouthwash oral suspension, Swish and swallow 10 mL Every 4 (Four) Hours As Needed for Mucositis., Disp: 1 each, Rfl: 3  •  metoclopramide (REGLAN) 10 MG tablet, Take 1 tablet by mouth 4 (Four) Times a Day., Disp: 60 tablet, Rfl: 1  •  montelukast (SINGULAIR) 10 MG tablet, , Disp: , Rfl:   •  OLANZapine (zyPREXA) 5 MG tablet, Take one tablet at HS on days 2, 3 and 4 after chemotherapy., Disp: 3 tablet, Rfl: 4  •  ondansetron (ZOFRAN) 8 MG tablet, Take 1 tablet by mouth Every 8 (Eight) Hours As Needed for Nausea or Vomiting., Disp: 30 tablet, Rfl: 1  •  ondansetron ODT (Zofran ODT) 8 MG disintegrating tablet, Place 1 tablet on the tongue Every 8 (Eight) Hours As Needed for Nausea or Vomiting., Disp: 30 tablet, Rfl: 1  •  oxyCODONE (Roxicodone) 5 MG immediate release tablet, Take 1 to 3 tablets by mouth every 4 hours as needed for pain., Disp: 180 tablet, Rfl: 0  •  sennosides-docusate (senna-docusate sodium) 8.6-50 MG per tablet, Take 2 tablets by mouth 2 (Two) Times a Day., Disp: 120 tablet, Rfl: 0  •  potassium chloride (K-DUR,KLOR-CON) 20 MEQ CR tablet, Take 1 tablet by mouth 2 (Two) Times a Day for 3 days., Disp: 6 tablet, Rfl: 0  No current facility-administered medications for this visit.    Facility-Administered Medications Ordered in Other Visits:   •  sodium chloride 0.9 % bolus 1,000 mL, 1,000 mL, Intravenous, Once, Jazmin Rodriguez APRN, Last Rate: 1,000 mL/hr at 11/09/22 1237, 1,000 mL at 11/09/22  1237    ALLERGIES:  No Known Allergies    REVIEW OF SYSTEMS:    A comprehensive 14 point review of systems was performed.  Significant findings as mentioned above.  All other systems reviewed and are negative.      Physical Exam   Vital Signs:   Vitals:    11/09/22 1117   BP: 115/77   Pulse: 110   Resp: 18   Temp: 97.5 °F (36.4 °C)   SpO2: 98%   General:  Awake, alert and oriented.  Appears fatigued but not toxic.  HEENT:  Pupils are equal, round and reactive to light and accommodation, Extra-ocular movements full, Oropharyx clear, mucous membranes dry  Neck:  No JVD, thyromegaly or lymphadenopathy  CV: Regular tachycardia, no murmurs, rubs or gallops  Resp: Lungs are clear to auscultation bilaterally  Abd:  Soft, non-tender, non-distended, bowel sounds present, no organomegaly or masses  Ext:  No clubbing, cyanosis or edema bilaterally.    Skin: Scattered petechiae on bilateral upper extremities and on chest.   Neuro:  MS as above, grossly nonfocal exam.    Pain Score:  Pain Score    11/09/22 1117   PainSc: 10-Worst pain ever   PainLoc: Chest     RECENT LABS:  Lab Results   Component Value Date    WBC 0.64 (C) 11/09/2022    HGB 8.0 (L) 11/09/2022    HCT 22.9 (L) 11/09/2022    MCV 90.9 11/09/2022    RDW 16.4 (H) 11/09/2022    PLT 15 (C) 11/09/2022    NEUTRORELPCT 89.5 (H) 10/28/2022    LYMPHORELPCT 1.6 (L) 10/28/2022    MONORELPCT 8.4 10/28/2022    EOSRELPCT 0.0 (L) 10/28/2022    BASORELPCT 0.1 10/28/2022    NEUTROABS 0.19 (L) 11/09/2022    LYMPHSABS 0.16 (L) 10/28/2022       Lab Results   Component Value Date     11/09/2022    K 3.1 (L) 11/09/2022    CO2 23.3 11/09/2022    CL 95 (L) 11/09/2022    BUN 32 (H) 11/09/2022    CREATININE 0.80 11/09/2022    GLUCOSE 123 (H) 11/09/2022    CALCIUM 9.8 11/09/2022    ALKPHOS 69 11/09/2022    AST 11 11/09/2022    ALT 9 11/09/2022    BILITOT 0.7 11/09/2022    ALBUMIN 3.84 11/09/2022    PROTEINTOT 6.4 11/09/2022    MG 2.1 10/28/2022       Lab Results   Component Value Date      (H) 09/14/2022    URICACID 2.1 (L) 09/14/2022       Lab Results   Component Value Date    FERRITIN 438.50 (H) 10/19/2022    IRON 54 10/19/2022    TIBC 270 (L) 10/19/2022    LABIRON 20 10/19/2022    SYFGVWAW90 1,220 (H) 10/19/2022    FOLATE 9.98 10/19/2022      ASSESSMENT & PLAN:  Serenity Villegas is a very pleasant 67 y.o. female with    1.  Limited stage small cell lung cancer:  -Please see Dr. Wood' most recent follow up note from 10/28/22 for full details. She has been receiving concurrent chemotherapy and radiation with Cisplatin/Etoposide. She completed 30 fractions last week and completed cycle 3 on 10/28/22.   -She is being seen today for an acute visit (see below). Will continue to closely monitor with supportive care while she recovers from her treatment. She will follow up with MD as scheduled next week.     2. Dehydration  3. Poor appetite  4. Nausea    -Will give 1 L of IVF today along with zofran 16 mg IVPB. As above, she has been non compliant with medications. Recommended she take zofran scheduled every 8 hours for now given ongoing nausea. She was previously given Rx for Reglan as this was reportedly helpful and she was advised to start taking. Given poor oral intake she was advised to resume Marinol 2.5 mg PO BID and refill was provided today. Continue Nexium 40 mg PO BID. She was advised to push oral hydration and work up to ~4 bottles of water/day. In the meantime, will closely monitor and give supportive IVF as needed. She will return for labs/possible IVF 11/11 and 11/14.     5.Pancytopenia:  6. Neutropenia:  7. Thrombocytopenia:    -Secondary to concurrent chemoradiation. B12 and folate have been replete. Iron panel/ferritin c/w AOCD/inflammation.   - Currently without active bleeding. Will transfuse 1 unit of irradiated platelets today.    - She was given Rx for Levaquin for prophylaxis last week but has not been taking. Currently afebrile and without infectious symptoms. She was  advised to take Levaquin and notify clinic if she should develop fever or any infectious symptoms.   -Will closely monitor her counts. She will return for repeat labs on Friday and Monday. Follow up with MD next week as scheduled.     8. Hypokalemia:  -Rx for oral potassium replacement provided today.       A total of 30 minutes were spent coordinating this patient’s care in clinic today; more than 50% of this time was face-to-face with the patient, reviewing her medical history and counseling on the current treatment and followup plan. All questions were answered to her satisfaction.      Electronically Signed by: Jazmin Rodriguez, DINORAH , November 9, 2022 12:46 EST       CC:   Gwyn Rod DO

## 2022-11-10 LAB
BH BB BLOOD EXPIRATION DATE: NORMAL
BH BB BLOOD TYPE BARCODE: 7300
BH BB DISPENSE STATUS: NORMAL
BH BB PRODUCT CODE: NORMAL
BH BB UNIT NUMBER: NORMAL
UNIT  ABO: NORMAL
UNIT  RH: NORMAL

## 2022-11-10 NOTE — PROGRESS NOTES
NAME: Serenity Villegas    : 1955    DATE:  2022     DIAGNOSIS:  Limited Stage Small Cell Lung Cancer     CHIEF COMPLAINT:  Follow up SCLC/Toxicity check    TREATMENT:  1.        2. Concurrent radiation to Right Lung delivered by Dr. Spain between 22 and 22    HISTORY OF PRESENT ILLNESS:   Serenity Villegas is a very pleasant 67 y.o. female who is being seen today in the presence of her son at the request of Dr. Moy Murrieta for evaluation and treatment of newly diagnosed lung cancer.  Ms. Villegas is quite anxious today.  She says that s she initially began to feel poorly approximately 6 weeks ago.  She started to feel short of breath, initially at night, but then progressively worse during the day as well, and says she noticed a rattling sensation in her chest.  She denies any chest pain, but says that she noticed her activity began to be affected.  She would feel very short of breath with minimal exertion and started to have trouble picking up her 2-year-old grandson.  Her son took her to a new primary care physician who promptly ordered CT imaging which was abnormal.  This led to her seeing Dr. Murrieta, and she underwent bronchoscopy on 2022.  Biopsies and BAL from the RML as well as 12 R and 7 lymph node stations all revealed small cell lung cancer.  PET/CT done 2022 revealed a central masslike process involving the right hilum centered around the right middle lobe mainstem bronchus which was approximately 4.3 cm in dimension (SUV 7.8) as well as a right suprahilar lymph node which was 2.5 cm in diameter with mass-effect on the right mainstem bronchus.  There was also an 11 mm nodule in the right lower lobe which did not show FDG hypermetabolism.  There was no evidence of distant metastatic disease.  She was referred here for further evaluation and treatment.    Ms. Villegas notes that she has lost approximately 4 pounds over the last several weeks.  Her appetite has been decent,  "but she does notice that she has mild nausea over the last several weeks.  She occasionally vomits, but not routinely.  She denies significant chest pain, but does occasionally get twinges of pain.  She complains of shortness of breath especially with activity and cough productive of clear sputum.  She denies abdominal pain.  She denies diarrhea or constipation.  She chronically gets headaches (maybe once to twice a month).  This is typical for her and she has a history of what sounds like classical migraine which has been going on for several years.  These headaches are not changed.  She denies neurologic symptoms.    INTERVAL HISTORY:  Ms. Villegas presents today for follow up of small cell lung cancer.  She is completed 3 cycles of cisplatin and etoposide, last 10/28/2022, and she completed concurrent radiation on 11/4/2022.  She has continued to really struggle with oral intake.  She says that she feels \"swoopy and dizzy.\"  She is taking in very little.  When asked what she had to eat yesterday she told me a popsicle.  She has been taking Zofran 8 mg twice daily, Marinol 2.5 mg twice daily and says she has been using oxycodone 5 mg 1 tablet twice a day to help with pain.  Despite this she has difficulty eating.  She has not been using Reglan.  She does continue to take Nexium 40 mg twice a day.  She tried to take Levaquin and potassium the other day and it came right back up.  She does say that the pain while remaining significant is somewhat better than it was previously.  She has not had fevers or chills.      PAST MEDICAL HISTORY:  Past Medical History:   Diagnosis Date   • Arthritis    • Cancer (HCC)    • Coronary artery disease    • Heart disease    • Heartburn    • Hyperlipidemia    • Hypertension    • Right lower lobe lung mass        PAST SURGICAL HISTORY:  Past Surgical History:   Procedure Laterality Date   • BRONCHOSCOPY N/A 8/22/2022    Procedure: BRONCHOSCOPY WITH ENDOBRONCHIAL ULTRASOUND;  Surgeon: " "Amari Murrieta MD;  Location:  [facility] OR;  Service: Pulmonary;  Laterality: N/A;   • CARDIAC SURGERY      3 2002   [facility]   • HYSTERECTOMY      arh   • PACEMAKER IMPLANTATION      [facility]   • VENOUS ACCESS DEVICE (PORT) INSERTION N/A 2022    Procedure: INSERTION VENOUS ACCESS DEVICE;  Surgeon: Ting Sarah MD;  Location:  [facility] OR;  Service: General;  Laterality: N/A;   Hysterectomy performed for endometriosis (benign) \"a long time ago\"    FAMILY HISTORY:  Family History   Problem Relation Age of Onset   • Cancer Mother    • Heart disease Father    • Heart attack Father    Mother  with melanoma  Maternal grandmother  of unknown type malignancy    SOCIAL HISTORY:  Social History     Socioeconomic History   • Marital status:    Tobacco Use   • Smoking status: Every Day     Packs/day: 0.25     Years: 20.00     Pack years: 5.00     Types: Cigarettes   • Smokeless tobacco: Never   • Tobacco comments:     1  pack per day history   Vaping Use   • Vaping Use: Never used   Substance and Sexual Activity   • Alcohol use: Never   • Drug use: Never   • Sexual activity: Defer   Ms. Villegas lives alone.  Her son accompanies her to her visit today and is very supportive.  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  No unusual chemical exposures.  She is a smoker and reports she currently smokes about half pack per day      REVIEW OF SYSTEMS:   A comprehensive 14 point review of systems was performed.  Significant findings as mentioned above.  All other systems reviewed and are negative.      MEDICATIONS:  The current medication list was reviewed in the EMR    Current Outpatient Medications:   •  albuterol sulfate  (90 Base) MCG/ACT inhaler, , Disp: , Rfl:   •  allopurinol (ZYLOPRIM) 300 MG tablet, Take 1 tablet by mouth Daily., Disp: 30 tablet, Rfl: 3  •  Anoro Ellipta 62.5-25 MCG/INH aerosol powder  inhaler, , Disp: , Rfl:   •  atenolol (TENORMIN) 50 MG tablet, " , Disp: , Rfl:   •  atorvastatin (LIPITOR) 40 MG tablet, , Disp: , Rfl:   •  benazepril (LOTENSIN) 20 MG tablet, , Disp: , Rfl:   •  Cyanocobalamin (Vitamin B-12) 500 MCG sublingual tablet, Place 500 mcg under the tongue Daily., Disp: 30 tablet, Rfl: 5  •  Diclofenac Sodium (VOLTAREN) 1 % gel gel, , Disp: , Rfl:   •  dronabinol (Marinol) 2.5 MG capsule, Take 1 capsule by mouth 2 (Two) Times a Day Before Meals., Disp: 60 capsule, Rfl: 0  •  escitalopram (LEXAPRO) 10 MG tablet, Take 1 tablet by mouth Daily., Disp: 30 tablet, Rfl: 1  •  esomeprazole (nexIUM) 40 MG capsule, Take 1 capsule by mouth 2 (Two) Times a Day., Disp: 60 capsule, Rfl: 3  •  levocetirizine (XYZAL) 5 MG tablet, , Disp: , Rfl:   •  levoFLOXacin (Levaquin) 500 MG tablet, Take 1 tablet by mouth Daily for 10 days., Disp: 10 tablet, Rfl: 0  •  lidocaine-prilocaine (EMLA) 2.5-2.5 % cream, Apply to Port-A-Cath site 30 minutes prior to arrival at infusion clinic., Disp: 30 g, Rfl: 1  •  loratadine (CLARITIN) 10 MG tablet, Take 1 tablet by mouth Daily., Disp: 30 tablet, Rfl: 1  •  magic mouthwash oral suspension, Swish and swallow 10 mL Every 4 (Four) Hours As Needed for Mucositis., Disp: 1 each, Rfl: 3  •  metoclopramide (REGLAN) 10 MG tablet, Take 1 tablet by mouth 4 (Four) Times a Day., Disp: 60 tablet, Rfl: 1  •  montelukast (SINGULAIR) 10 MG tablet, , Disp: , Rfl:   •  OLANZapine (zyPREXA) 5 MG tablet, Take one tablet at HS on days 2, 3 and 4 after chemotherapy., Disp: 3 tablet, Rfl: 4  •  ondansetron (ZOFRAN) 8 MG tablet, Take 1 tablet by mouth Every 8 (Eight) Hours As Needed for Nausea or Vomiting., Disp: 30 tablet, Rfl: 1  •  ondansetron ODT (Zofran ODT) 8 MG disintegrating tablet, Place 1 tablet on the tongue Every 8 (Eight) Hours As Needed for Nausea or Vomiting., Disp: 30 tablet, Rfl: 1  •  oxyCODONE (Roxicodone) 5 MG immediate release tablet, Take 1 to 3 tablets by mouth every 4 hours as needed for pain., Disp: 180 tablet, Rfl: 0  •   sennosides-docusate (senna-docusate sodium) 8.6-50 MG per tablet, Take 2 tablets by mouth 2 (Two) Times a Day., Disp: 120 tablet, Rfl: 0    ALLERGIES:  No Known Allergies    PHYSICAL EXAM:  Vitals:    11/14/22 1602   BP: 127/90   Pulse: 110   Resp: 18   Temp: 97.3 °F (36.3 °C)   TempSrc: Temporal   SpO2: 98%   Weight: 46.5 kg (102 lb 9.6 oz)   PainSc: 0-No pain         General:  Awake, alert and oriented.  Appears fatigued but not toxic.  HEENT:  Pupils are equal, round and reactive to light and accommodation, Extra-ocular movements full, Oropharyx clear, mucous membranes tachy  Neck:  No JVD, thyromegaly or lymphadenopathy  CV: Regular tachycardia, no murmurs, rubs or gallops  Resp: Lungs are clear to auscultation bilaterally, no crackles  Abd:  Soft, non-tender, non-distended, bowel sounds present, no organomegaly or masses  Ext:  No clubbing, cyanosis, no lower extremity edema  Lymph:  No cervical, supraclavicular, axillary adenopathy.  Neuro:  MS as above, grossly nonfocal exam.    PATHOLOGY:    08/22/2022            ENDOSCOPY:    Bronchoscopy: Dr. Murrieta 8/22/2022  Operation: Flexible fiberoptic bronchoscopy     Findings: Tumor emanating from the right middle lobe     Specimen(s): Endobronchial biopsies right middle lobe.  BAL right middle lobe.  Cytology brush right middle lobe.  Transbronchial needle aspiration station 12 R and station 7     Estimated Blood Loss: Minimal/None     Complications: No immediate      IMAGING:    CT Chest with Contrast 08/10/22      NM PET/CT Skull Base to Mid Thigh (08/24/2022 11:30)  FINDINGS:      HEAD:    Brain:  Unremarkable as visualized.    Nasopharynx:  Unremarkable.      NECK:    Hypopharynx:  Unremarkable.    Larynx:  Unremarkable.    Trachea:  Unremarkable.    Retropharyngeal space:  Unremarkable.    Submandibular/parotid glands:  Unremarkable.  Glands are normal in  size.    Thyroid:  Unremarkable.  No enlarged or calcified nodules.      CHEST:    Lungs:  Central  masslike process involving the right hilum centered  around the right middle lobe main stem bronchus that may represent a  sharath conglomeration or a primary mass and is 4.3 cm. FDG  hypermetabolism is noted with maximum SUV: 7.8.  11 mm nodule right  lower lobe shows no FDG hypermetabolism.  Emphysema is noted.    Pleural space:  Unremarkable.  No significant effusion.  No  pneumothorax.    Heart:  Cardiomegaly.  CABG.  No significant pericardial effusion.    Mediastinum:  Unremarkable.  No mass.      ABDOMEN:    Liver:  Unremarkable.    Gallbladder and bile ducts:  Unremarkable.  No calcified stones.  No  ductal dilation.    Pancreas:  Unremarkable.  No ductal dilation.    Spleen:  Unremarkable.  No splenomegaly.    Adrenals:  Unremarkable.  No mass.    Kidneys and ureters:  Atrophy right kidney.  Compensatory hypertrophy  left kidney.  Nonobstructing left kidney stones.    Stomach and bowel:  Sigmoid diverticulosis without evidence of acute  diverticulitis.  No obstruction.      PELVIS:    Appendix:  No findings to suggest acute appendicitis.    Bladder:  Unremarkable.    Reproductive:  Unremarkable as visualized.      WHOLE BODY:    Intraperitoneal space:  Unremarkable.  No significant fluid  collection.  No free air.    Bones/joints:  Unremarkable.  No acute fracture.  No dislocation.    Soft tissues:  Unremarkable.    Vasculature:  Advanced atherosclerosis of the aortoiliac vasculature.   No aortic aneurysm.    Lymph nodes:  Enlarged right suprahilar lymph node or primary mass is  noted that is approximately 2.5 cm and produces mass effect on the right  mainstem bronchus. FDG hypermetabolism is noted. Maximum SUV: 8.8.     IMPRESSION:  1.  Central masslike process involving the right hilum centered around  the right middle lobe main stem bronchus that may represent a sharath  conglomeration or a primary mass and is 4.3 cm. FDG hypermetabolism is  noted with maximum SUV: 7.8.  2.  Enlarged right suprahilar lymph  node or primary mass is noted that  is approximately 2.5 cm and produces mass effect on the right mainstem  bronchus. FDG hypermetabolism is noted. Maximum SUV: 8.8.  3.  11 mm nodule right lower lobe shows no FDG hypermetabolism.  4. Other incidental and nonacute findings as above.    CT Head With Contrast (09/09/2022 12:10)  FINDINGS:   No acute intracranial abnormality. No midline shift or mass  effect. No hydrocephalus or intracranial hemorrhage. There is no CT  evidence of acute vascular territory infarct.  No pathologic contrast  enhancement identified. No enhancing extra-axial fluid collections. No  enhancing brain parenchymal lesions. Bone windows show no acute osseous  abnormality.     IMPRESSION:  1. No acute intracranial abnormality.  2. No pathologic contrast enhancement.        RECENT LABS:  Lab Results   Component Value Date    WBC 0.64 (C) 11/09/2022    HGB 8.0 (L) 11/09/2022    HCT 22.9 (L) 11/09/2022    MCV 90.9 11/09/2022    RDW 16.4 (H) 11/09/2022    PLT 58 (L) 11/09/2022    NEUTRORELPCT 89.5 (H) 10/28/2022    LYMPHORELPCT 1.6 (L) 10/28/2022    MONORELPCT 8.4 10/28/2022    EOSRELPCT 0.0 (L) 10/28/2022    BASORELPCT 0.1 10/28/2022    NEUTROABS 0.19 (L) 11/09/2022    LYMPHSABS 0.16 (L) 10/28/2022       Lab Results   Component Value Date     11/09/2022    K 3.1 (L) 11/09/2022    CO2 23.3 11/09/2022    CL 95 (L) 11/09/2022    BUN 32 (H) 11/09/2022    CREATININE 0.80 11/09/2022    GLUCOSE 123 (H) 11/09/2022    CALCIUM 9.8 11/09/2022    ALKPHOS 69 11/09/2022    AST 11 11/09/2022    ALT 9 11/09/2022    BILITOT 0.7 11/09/2022    ALBUMIN 3.84 11/09/2022    PROTEINTOT 6.4 11/09/2022    MG 2.1 10/28/2022     Lab Results   Component Value Date    FERRITIN 438.50 (H) 10/19/2022    IRON 54 10/19/2022    TIBC 270 (L) 10/19/2022    LABIRON 20 10/19/2022    MSPOJCMB80 1,220 (H) 10/19/2022    FOLATE 9.98 10/19/2022     Lab Results   Component Value Date    TSH 0.638 10/19/2022     LDH   Date Value Ref Range  Status   09/14/2022 264 (H) 135 - 214 U/L Final     Uric Acid   Date Value Ref Range Status   09/14/2022 2.1 (L) 2.4 - 5.7 mg/dL Final         ASSESSMENT & PLAN:  Serenity Villegas is a very pleasant 67 y.o. female with newly diagnosed limited stage small cell lung cancer.    1.  Limited stage small cell lung cancer:  - Recommended concurrent chemotherapy and radiation with cisplatin and etoposide.  We discussed potential risks benefits and side effects and she decided to proceed.  - She has completed 3 cycles of Cis/Etoposide to date and completed concurrent XRT on 11/4/2022.  - She has struggled with decreased PO intake due to esophageal toxicity from radiation (see below).  -We will continue to work with supportive care to improve her circumstances.  - Cycle 4 chemotherapy is due this week.  We will hold this for least a week.    2.  Weight loss / poor po intake / esophageal pain/ nausea:  -She is ordered oxycodone 5 mg 1-3 tabs q4h as needed to control pain.  She says she has been taking 1 tablet about twice a day.  This may not be sufficient to control her pain to allow her to eat.  Recommended she try to increase use as she can.  We will also give Carafate 1 g of the liquid formulation 4 times daily to see if coating the mucosa may help her to eat.  - Strongly encouraged her to increase oral intake, even if she does this with nutritional supplements initially.    -Continue Marinol 2.5 mg twice daily.  Continue Zofran, but increase to every 8 hours.  Restart Reglan as needed.  - Continue Nexium at 40 mg twice daily.  - Will give 2 L NS tomorrow along with Zofran 16 mg IV, Pepcid 20 mg IV and 40 mill equivalents IV potassium.  -Hold treatment to allow her to recover.     3. Thrombocytopenia/ Neutropenia:  - Significantly improved today.  Discontinue Levaquin.    4.  Hypokalemia:  - We will give 40 mill equivalents IV tomorrow.  - We will try to replace the rest with liquid formulation oral potassium.  We will give  40 mill equivalents twice daily for 2 days and then 20 mill equivalents twice daily for 2 days and then stop    5.  Prophylaxis:  -Recommended 2022 flu vaccine.   - Received Prevnar 20 vaccine  - She has not had COVID vaccinations. Recommended that she get this completed.  Could also consider Evusheld treatment.    6.  Follow-up:  - Continue supportive care to help her recover  - Hold chemotherapy for at least 1 week.  - 2 L normal saline with Zofran 16 mg IV, Pepcid 20 mg IV and potassium 40 mill equivalents IV tomorrow  - Oral potassium replacement as above.  - Start sucralfate 1 g 4 times daily to help coat her mucosa and see if this can help with pain control.  - Liberalize oxycodone use as well as antiemetic use to help with oral intake  - Return to see me in 1 week with CBC and CMP    7.  ACO / BALJIT/Other  Quality measures  -  Serenity Villegas did not receive 2022 flu vaccine.  This has been recommended to her  -  Serenity Villegas reports a pain score of 0.  Given her pain assessment as noted, treatment options were discussed and the following options were decided upon as a follow-up plan to address the patient's pain: No intervention needed at this time.  -  Current outpatient and discharge medications have been reconciled for the patient.  Reviewed by: Rhianna Wood MD      I spent 40 minutes with Serenity Villegas today.  In the office today, more than 50% of this time was spent face-to-face with her  in counseling / coordination of care, reviewing her medical history and counseling on the current treatment plan.  All questions were answered to her satisfaction      Electronically Signed by: Rhianna Wood MD      CC:     DO Amrai Russell MD Weisi Yan, MD

## 2022-11-11 DIAGNOSIS — C34.90 SMALL CELL LUNG CANCER: Primary | ICD-10-CM

## 2022-11-11 RX ORDER — PALONOSETRON 0.05 MG/ML
0.25 INJECTION, SOLUTION INTRAVENOUS ONCE
Status: CANCELLED | OUTPATIENT
Start: 2022-11-29

## 2022-11-11 RX ORDER — MAGNESIUM SULFATE HEPTAHYDRATE 40 MG/ML
2 INJECTION, SOLUTION INTRAVENOUS ONCE
Status: CANCELLED
Start: 2022-11-29 | End: 2022-11-16

## 2022-11-11 RX ORDER — SODIUM CHLORIDE AND POTASSIUM CHLORIDE 150; 900 MG/100ML; MG/100ML
500 INJECTION, SOLUTION INTRAVENOUS CONTINUOUS
Status: CANCELLED
Start: 2022-11-29 | End: 2022-11-29

## 2022-11-11 RX ORDER — OLANZAPINE 5 MG/1
5 TABLET ORAL ONCE
Status: CANCELLED | OUTPATIENT
Start: 2022-11-29 | End: 2022-11-16

## 2022-11-11 RX ORDER — SODIUM CHLORIDE 9 MG/ML
250 INJECTION, SOLUTION INTRAVENOUS ONCE
Status: CANCELLED | OUTPATIENT
Start: 2022-11-29

## 2022-11-11 RX ORDER — SODIUM CHLORIDE 9 MG/ML
250 INJECTION, SOLUTION INTRAVENOUS ONCE
Status: CANCELLED | OUTPATIENT
Start: 2022-11-30

## 2022-11-11 RX ORDER — SODIUM CHLORIDE 9 MG/ML
250 INJECTION, SOLUTION INTRAVENOUS ONCE
Status: CANCELLED | OUTPATIENT
Start: 2022-12-01

## 2022-11-14 ENCOUNTER — OFFICE VISIT (OUTPATIENT)
Dept: ONCOLOGY | Facility: CLINIC | Age: 67
End: 2022-11-14

## 2022-11-14 ENCOUNTER — LAB (OUTPATIENT)
Dept: ONCOLOGY | Facility: CLINIC | Age: 67
End: 2022-11-14

## 2022-11-14 VITALS
OXYGEN SATURATION: 98 % | SYSTOLIC BLOOD PRESSURE: 127 MMHG | RESPIRATION RATE: 18 BRPM | BODY MASS INDEX: 17.61 KG/M2 | WEIGHT: 102.6 LBS | TEMPERATURE: 97.3 F | DIASTOLIC BLOOD PRESSURE: 90 MMHG | HEART RATE: 110 BPM

## 2022-11-14 DIAGNOSIS — R63.0 ANOREXIA: ICD-10-CM

## 2022-11-14 DIAGNOSIS — G89.3 NEOPLASM RELATED PAIN: ICD-10-CM

## 2022-11-14 DIAGNOSIS — E86.0 DEHYDRATION: ICD-10-CM

## 2022-11-14 DIAGNOSIS — C34.90 SMALL CELL LUNG CANCER: ICD-10-CM

## 2022-11-14 DIAGNOSIS — R11.2 NAUSEA AND VOMITING, UNSPECIFIED VOMITING TYPE: ICD-10-CM

## 2022-11-14 DIAGNOSIS — C34.90 SMALL CELL LUNG CANCER: Primary | ICD-10-CM

## 2022-11-14 DIAGNOSIS — E87.6 HYPOKALEMIA: ICD-10-CM

## 2022-11-14 DIAGNOSIS — D69.6 THROMBOCYTOPENIA: ICD-10-CM

## 2022-11-14 LAB
ALBUMIN SERPL-MCNC: 4.19 G/DL (ref 3.5–5.2)
ALBUMIN/GLOB SERPL: 1.7 G/DL
ALP SERPL-CCNC: 72 U/L (ref 39–117)
ALT SERPL W P-5'-P-CCNC: 9 U/L (ref 1–33)
ANION GAP SERPL CALCULATED.3IONS-SCNC: 17.2 MMOL/L (ref 5–15)
AST SERPL-CCNC: 14 U/L (ref 1–32)
BILIRUB SERPL-MCNC: 0.6 MG/DL (ref 0–1.2)
BUN SERPL-MCNC: 30 MG/DL (ref 8–23)
BUN/CREAT SERPL: 35.7 (ref 7–25)
CALCIUM SPEC-SCNC: 9.7 MG/DL (ref 8.6–10.5)
CHLORIDE SERPL-SCNC: 92 MMOL/L (ref 98–107)
CO2 SERPL-SCNC: 26.8 MMOL/L (ref 22–29)
CREAT SERPL-MCNC: 0.84 MG/DL (ref 0.57–1)
DACRYOCYTES BLD QL SMEAR: ABNORMAL
DEPRECATED RDW RBC AUTO: 53.2 FL (ref 37–54)
EGFRCR SERPLBLD CKD-EPI 2021: 77.2 ML/MIN/1.73
ERYTHROCYTE [DISTWIDTH] IN BLOOD BY AUTOMATED COUNT: 17 % (ref 12.3–15.4)
GLOBULIN UR ELPH-MCNC: 2.4 GM/DL
GLUCOSE SERPL-MCNC: 127 MG/DL (ref 65–99)
HCT VFR BLD AUTO: 26.7 % (ref 34–46.6)
HGB BLD-MCNC: 9.3 G/DL (ref 12–15.9)
LYMPHOCYTES # BLD MANUAL: 0.42 10*3/MM3 (ref 0.7–3.1)
LYMPHOCYTES NFR BLD MANUAL: 11 % (ref 5–12)
MCH RBC QN AUTO: 31.1 PG (ref 26.6–33)
MCHC RBC AUTO-ENTMCNC: 34.8 G/DL (ref 31.5–35.7)
MCV RBC AUTO: 89.3 FL (ref 79–97)
MONOCYTES # BLD: 0.46 10*3/MM3 (ref 0.1–0.9)
NEUTROPHILS # BLD AUTO: 3.33 10*3/MM3 (ref 1.7–7)
NEUTROPHILS NFR BLD MANUAL: 77 % (ref 42.7–76)
NEUTS BAND NFR BLD MANUAL: 2 % (ref 0–5)
NRBC SPEC MANUAL: 1 /100 WBC (ref 0–0.2)
PLAT MORPH BLD: NORMAL
PLATELET # BLD AUTO: 78 10*3/MM3 (ref 140–450)
PMV BLD AUTO: 11.2 FL (ref 6–12)
POLYCHROMASIA BLD QL SMEAR: ABNORMAL
POTASSIUM SERPL-SCNC: 2.8 MMOL/L (ref 3.5–5.2)
PROT SERPL-MCNC: 6.6 G/DL (ref 6–8.5)
RBC # BLD AUTO: 2.99 10*6/MM3 (ref 3.77–5.28)
SCHISTOCYTES BLD QL SMEAR: ABNORMAL
SODIUM SERPL-SCNC: 136 MMOL/L (ref 136–145)
TOXIC GRANULATION: ABNORMAL
VARIANT LYMPHS NFR BLD MANUAL: 10 % (ref 19.6–45.3)
WBC NRBC COR # BLD: 4.21 10*3/MM3 (ref 3.4–10.8)

## 2022-11-14 PROCEDURE — 99215 OFFICE O/P EST HI 40 MIN: CPT | Performed by: INTERNAL MEDICINE

## 2022-11-14 PROCEDURE — 36415 COLL VENOUS BLD VENIPUNCTURE: CPT | Performed by: INTERNAL MEDICINE

## 2022-11-14 PROCEDURE — 85025 COMPLETE CBC W/AUTO DIFF WBC: CPT | Performed by: INTERNAL MEDICINE

## 2022-11-14 PROCEDURE — 80053 COMPREHEN METABOLIC PANEL: CPT | Performed by: INTERNAL MEDICINE

## 2022-11-14 PROCEDURE — 85007 BL SMEAR W/DIFF WBC COUNT: CPT | Performed by: INTERNAL MEDICINE

## 2022-11-14 RX ORDER — OXYCODONE HYDROCHLORIDE 5 MG/1
TABLET ORAL
Qty: 180 TABLET | Refills: 0 | Status: SHIPPED | OUTPATIENT
Start: 2022-11-14

## 2022-11-14 RX ORDER — SUCRALFATE ORAL 1 G/10ML
1 SUSPENSION ORAL 4 TIMES DAILY
Qty: 414 ML | Refills: 1 | Status: SHIPPED | OUTPATIENT
Start: 2022-11-14

## 2022-11-14 RX ORDER — POTASSIUM CHLORIDE 20MEQ/15ML
LIQUID (ML) ORAL
Qty: 180 ML | Refills: 0 | Status: SHIPPED | OUTPATIENT
Start: 2022-11-14 | End: 2022-11-18

## 2022-11-22 ENCOUNTER — TELEPHONE (OUTPATIENT)
Dept: ONCOLOGY | Facility: HOSPITAL | Age: 67
End: 2022-11-22

## 2022-11-22 NOTE — TELEPHONE ENCOUNTER
Attempted to call patient regarding missed appointment. Patient did not answer and mailbox was full. Called patients emergency contact Dung who informs me patient was weak and tired and didn't feel like coming in for treatment today. Dung made aware of next infusion appointment on 11/29/22 at 0815 and states he will communicate this with the patient.

## 2022-11-28 NOTE — PROGRESS NOTES
NAME: Serenity Villegas    : 1955    DATE:  2022     DIAGNOSIS:  Limited Stage Small Cell Lung Cancer     CHIEF COMPLAINT:  Follow up SCLC/Toxicity check asd    TREATMENT:  1.        2. Concurrent radiation to Right Lung delivered by Dr. Spain between 22 and 22    HISTORY OF PRESENT ILLNESS:   Serenity Villegas is a very pleasant 67 y.o. female who is being seen today in the presence of her son at the request of Dr. Moy Murrieta for evaluation and treatment of newly diagnosed lung cancer.  Ms. Villegas is quite anxious today.  She says that s she initially began to feel poorly approximately 6 weeks ago.  She started to feel short of breath, initially at night, but then progressively worse during the day as well, and says she noticed a rattling sensation in her chest.  She denies any chest pain, but says that she noticed her activity began to be affected.  She would feel very short of breath with minimal exertion and started to have trouble picking up her 2-year-old grandson.  Her son took her to a new primary care physician who promptly ordered CT imaging which was abnormal.  This led to her seeing Dr. Murrieta, and she underwent bronchoscopy on 2022.  Biopsies and BAL from the [facility] as well as 12 R and 7 lymph node stations all revealed small cell lung cancer.  PET/CT done 2022 revealed a central masslike process involving the right hilum centered around the right middle lobe mainstem bronchus which was approximately 4.3 cm in dimension (SUV 7.8) as well as a right suprahilar lymph node which was 2.5 cm in diameter with mass-effect on the right mainstem bronchus.  There was also an 11 mm nodule in the right lower lobe which did not show FDG hypermetabolism.  There was no evidence of distant metastatic disease.  She was referred here for further evaluation and treatment.    Ms. Villegas notes that she has lost approximately 4 pounds over the last several weeks.  Her appetite has been  "decent, but she does notice that she has mild nausea over the last several weeks.  She occasionally vomits, but not routinely.  She denies significant chest pain, but does occasionally get twinges of pain.  She complains of shortness of breath especially with activity and cough productive of clear sputum.  She denies abdominal pain.  She denies diarrhea or constipation.  She chronically gets headaches (maybe once to twice a month).  This is typical for her and she has a history of what sounds like classical migraine which has been going on for several years.  These headaches are not changed.  She denies neurologic symptoms.    INTERVAL HISTORY:  Ms. Villegas presents today for follow up of small cell lung cancer.  She is completed 3 cycles of cisplatin and etoposide, last 10/28/2022, and she completed concurrent radiation on 11/4/2022.  She has continued to really struggle with oral intake.  She says that she feels \"swoopy and dizzy.\"  She is taking in very little.  When asked what she had to eat yesterday she told me a popsicle.  She has been taking Zofran 8 mg twice daily, Marinol 2.5 mg twice daily and says she has been using oxycodone 5 mg 1 tablet twice a day to help with pain.  Despite this she has difficulty eating.  She has not been using Reglan.  She does continue to take Nexium 40 mg twice a day.  She tried to take Levaquin and potassium the other day and it came right back up.  She does say that the pain while remaining significant is somewhat better than it was previously.  She has not had fevers or chills.      PAST MEDICAL HISTORY:  Past Medical History:   Diagnosis Date   • Arthritis    • Cancer (HCC)    • Coronary artery disease    • Heart disease    • Heartburn    • Hyperlipidemia    • Hypertension    • Right lower lobe lung mass        PAST SURGICAL HISTORY:  Past Surgical History:   Procedure Laterality Date   • BRONCHOSCOPY N/A 8/22/2022    Procedure: BRONCHOSCOPY WITH ENDOBRONCHIAL ULTRASOUND;  " "Surgeon: Amari Murrieta MD;  Location:  [facility] OR;  Service: Pulmonary;  Laterality: N/A;   • CARDIAC SURGERY      3 2002   [facility]   • HYSTERECTOMY      arh   • PACEMAKER IMPLANTATION      [facility]   • VENOUS ACCESS DEVICE (PORT) INSERTION N/A 2022    Procedure: INSERTION VENOUS ACCESS DEVICE;  Surgeon: Ting Sarah MD;  Location:  [facility] OR;  Service: General;  Laterality: N/A;   Hysterectomy performed for endometriosis (benign) \"a long time ago\"    FAMILY HISTORY:  Family History   Problem Relation Age of Onset   • Cancer Mother    • Heart disease Father    • Heart attack Father    Mother  with melanoma  Maternal grandmother  of unknown type malignancy    SOCIAL HISTORY:  Social History     Socioeconomic History   • Marital status:    Tobacco Use   • Smoking status: Every Day     Packs/day: 0.25     Years: 20.00     Pack years: 5.00     Types: Cigarettes   • Smokeless tobacco: Never   • Tobacco comments:     1  pack per day history   Vaping Use   • Vaping Use: Never used   Substance and Sexual Activity   • Alcohol use: Never   • Drug use: Never   • Sexual activity: Defer   Ms. Villegas lives alone.  Her son accompanies her to her visit today and is very supportive.  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  No unusual chemical exposures.  She is a smoker and reports she currently smokes about half pack per day      REVIEW OF SYSTEMS:   A comprehensive 14 point review of systems was performed.  Significant findings as mentioned above.  All other systems reviewed and are negative.      MEDICATIONS:  The current medication list was reviewed in the EMR    Current Outpatient Medications:   •  albuterol sulfate  (90 Base) MCG/ACT inhaler, , Disp: , Rfl:   •  allopurinol (ZYLOPRIM) 300 MG tablet, Take 1 tablet by mouth Daily., Disp: 30 tablet, Rfl: 3  •  Anoro Ellipta 62.5-25 MCG/INH aerosol powder  inhaler, , Disp: , Rfl:   •  atenolol (TENORMIN) 50 MG " tablet, , Disp: , Rfl:   •  atorvastatin (LIPITOR) 40 MG tablet, , Disp: , Rfl:   •  benazepril (LOTENSIN) 20 MG tablet, , Disp: , Rfl:   •  Cyanocobalamin (Vitamin B-12) 500 MCG sublingual tablet, Place 500 mcg under the tongue Daily., Disp: 30 tablet, Rfl: 5  •  Diclofenac Sodium (VOLTAREN) 1 % gel gel, , Disp: , Rfl:   •  dronabinol (Marinol) 2.5 MG capsule, Take 1 capsule by mouth 2 (Two) Times a Day Before Meals., Disp: 60 capsule, Rfl: 0  •  escitalopram (LEXAPRO) 10 MG tablet, Take 1 tablet by mouth Daily., Disp: 30 tablet, Rfl: 1  •  esomeprazole (nexIUM) 40 MG capsule, Take 1 capsule by mouth 2 (Two) Times a Day., Disp: 60 capsule, Rfl: 3  •  levocetirizine (XYZAL) 5 MG tablet, , Disp: , Rfl:   •  levoFLOXacin (Levaquin) 500 MG tablet, Take 1 tablet by mouth Daily for 10 days., Disp: 10 tablet, Rfl: 0  •  lidocaine-prilocaine (EMLA) 2.5-2.5 % cream, Apply to Port-A-Cath site 30 minutes prior to arrival at infusion clinic., Disp: 30 g, Rfl: 1  •  loratadine (CLARITIN) 10 MG tablet, Take 1 tablet by mouth Daily., Disp: 30 tablet, Rfl: 1  •  magic mouthwash oral suspension, Swish and swallow 10 mL Every 4 (Four) Hours As Needed for Mucositis., Disp: 1 each, Rfl: 3  •  metoclopramide (REGLAN) 10 MG tablet, Take 1 tablet by mouth 4 (Four) Times a Day., Disp: 60 tablet, Rfl: 1  •  montelukast (SINGULAIR) 10 MG tablet, , Disp: , Rfl:   •  OLANZapine (zyPREXA) 5 MG tablet, Take one tablet at HS on days 2, 3 and 4 after chemotherapy., Disp: 3 tablet, Rfl: 4  •  ondansetron (ZOFRAN) 8 MG tablet, Take 1 tablet by mouth Every 8 (Eight) Hours As Needed for Nausea or Vomiting., Disp: 30 tablet, Rfl: 1  •  ondansetron ODT (Zofran ODT) 8 MG disintegrating tablet, Place 1 tablet on the tongue Every 8 (Eight) Hours As Needed for Nausea or Vomiting., Disp: 30 tablet, Rfl: 1  •  oxyCODONE (Roxicodone) 5 MG immediate release tablet, Take 1 to 3 tablets by mouth every 4 hours as needed for pain., Disp: 180 tablet, Rfl: 0  •   sennosides-docusate (senna-docusate sodium) 8.6-50 MG per tablet, Take 2 tablets by mouth 2 (Two) Times a Day., Disp: 120 tablet, Rfl: 0    ALLERGIES:  No Known Allergies    PHYSICAL EXAM:  Vitals:    11/14/22 1602   BP: 127/90   Pulse: 110   Resp: 18   Temp: 97.3 °F (36.3 °C)   TempSrc: Temporal   SpO2: 98%   Weight: 46.5 kg (102 lb 9.6 oz)   PainSc: 0-No pain         General:  Awake, alert and oriented.  Appears fatigued but not toxic.  HEENT:  Pupils are equal, round and reactive to light and accommodation, Extra-ocular movements full, Oropharyx clear, mucous membranes tachy  Neck:  No JVD, thyromegaly or lymphadenopathy  CV: Regular tachycardia, no murmurs, rubs or gallops  Resp: Lungs are clear to auscultation bilaterally, no crackles  Abd:  Soft, non-tender, non-distended, bowel sounds present, no organomegaly or masses  Ext:  No clubbing, cyanosis, no lower extremity edema  Lymph:  No cervical, supraclavicular, axillary adenopathy.  Neuro:  MS as above, grossly nonfocal exam.    PATHOLOGY:    08/22/2022            ENDOSCOPY:    Bronchoscopy: Dr. Murrieta 8/22/2022  Operation: Flexible fiberoptic bronchoscopy     Findings: Tumor emanating from the right middle lobe     Specimen(s): Endobronchial biopsies right middle lobe.  BAL right middle lobe.  Cytology brush right middle lobe.  Transbronchial needle aspiration station 12 R and station 7     Estimated Blood Loss: Minimal/None     Complications: No immediate      IMAGING:    CT Chest with Contrast 08/10/22      NM PET/CT Skull Base to Mid Thigh (08/24/2022 11:30)  FINDINGS:      HEAD:    Brain:  Unremarkable as visualized.    Nasopharynx:  Unremarkable.      NECK:    Hypopharynx:  Unremarkable.    Larynx:  Unremarkable.    Trachea:  Unremarkable.    Retropharyngeal space:  Unremarkable.    Submandibular/parotid glands:  Unremarkable.  Glands are normal in  size.    Thyroid:  Unremarkable.  No enlarged or calcified nodules.      CHEST:    Lungs:  Central  masslike process involving the right hilum centered  around the right middle lobe main stem bronchus that may represent a  sharath conglomeration or a primary mass and is 4.3 cm. FDG  hypermetabolism is noted with maximum SUV: 7.8.  11 mm nodule right  lower lobe shows no FDG hypermetabolism.  Emphysema is noted.    Pleural space:  Unremarkable.  No significant effusion.  No  pneumothorax.    Heart:  Cardiomegaly.  CABG.  No significant pericardial effusion.    Mediastinum:  Unremarkable.  No mass.      ABDOMEN:    Liver:  Unremarkable.    Gallbladder and bile ducts:  Unremarkable.  No calcified stones.  No  ductal dilation.    Pancreas:  Unremarkable.  No ductal dilation.    Spleen:  Unremarkable.  No splenomegaly.    Adrenals:  Unremarkable.  No mass.    Kidneys and ureters:  Atrophy right kidney.  Compensatory hypertrophy  left kidney.  Nonobstructing left kidney stones.    Stomach and bowel:  Sigmoid diverticulosis without evidence of acute  diverticulitis.  No obstruction.      PELVIS:    Appendix:  No findings to suggest acute appendicitis.    Bladder:  Unremarkable.    Reproductive:  Unremarkable as visualized.      WHOLE BODY:    Intraperitoneal space:  Unremarkable.  No significant fluid  collection.  No free air.    Bones/joints:  Unremarkable.  No acute fracture.  No dislocation.    Soft tissues:  Unremarkable.    Vasculature:  Advanced atherosclerosis of the aortoiliac vasculature.   No aortic aneurysm.    Lymph nodes:  Enlarged right suprahilar lymph node or primary mass is  noted that is approximately 2.5 cm and produces mass effect on the right  mainstem bronchus. FDG hypermetabolism is noted. Maximum SUV: 8.8.     IMPRESSION:  1.  Central masslike process involving the right hilum centered around  the right middle lobe main stem bronchus that may represent a sharath  conglomeration or a primary mass and is 4.3 cm. FDG hypermetabolism is  noted with maximum SUV: 7.8.  2.  Enlarged right suprahilar lymph  node or primary mass is noted that  is approximately 2.5 cm and produces mass effect on the right mainstem  bronchus. FDG hypermetabolism is noted. Maximum SUV: 8.8.  3.  11 mm nodule right lower lobe shows no FDG hypermetabolism.  4. Other incidental and nonacute findings as above.    CT Head With Contrast (09/09/2022 12:10)  FINDINGS:   No acute intracranial abnormality. No midline shift or mass  effect. No hydrocephalus or intracranial hemorrhage. There is no CT  evidence of acute vascular territory infarct.  No pathologic contrast  enhancement identified. No enhancing extra-axial fluid collections. No  enhancing brain parenchymal lesions. Bone windows show no acute osseous  abnormality.     IMPRESSION:  1. No acute intracranial abnormality.  2. No pathologic contrast enhancement.        RECENT LABS:  Lab Results   Component Value Date    WBC 0.64 (C) 11/09/2022    HGB 8.0 (L) 11/09/2022    HCT 22.9 (L) 11/09/2022    MCV 90.9 11/09/2022    RDW 16.4 (H) 11/09/2022    PLT 58 (L) 11/09/2022    NEUTRORELPCT 89.5 (H) 10/28/2022    LYMPHORELPCT 1.6 (L) 10/28/2022    MONORELPCT 8.4 10/28/2022    EOSRELPCT 0.0 (L) 10/28/2022    BASORELPCT 0.1 10/28/2022    NEUTROABS 0.19 (L) 11/09/2022    LYMPHSABS 0.16 (L) 10/28/2022       Lab Results   Component Value Date     11/09/2022    K 3.1 (L) 11/09/2022    CO2 23.3 11/09/2022    CL 95 (L) 11/09/2022    BUN 32 (H) 11/09/2022    CREATININE 0.80 11/09/2022    GLUCOSE 123 (H) 11/09/2022    CALCIUM 9.8 11/09/2022    ALKPHOS 69 11/09/2022    AST 11 11/09/2022    ALT 9 11/09/2022    BILITOT 0.7 11/09/2022    ALBUMIN 3.84 11/09/2022    PROTEINTOT 6.4 11/09/2022    MG 2.1 10/28/2022     Lab Results   Component Value Date    FERRITIN 438.50 (H) 10/19/2022    IRON 54 10/19/2022    TIBC 270 (L) 10/19/2022    LABIRON 20 10/19/2022    BPFCKQJM09 1,220 (H) 10/19/2022    FOLATE 9.98 10/19/2022     Lab Results   Component Value Date    TSH 0.638 10/19/2022     LDH   Date Value Ref Range  Status   09/14/2022 264 (H) 135 - 214 U/L Final     Uric Acid   Date Value Ref Range Status   09/14/2022 2.1 (L) 2.4 - 5.7 mg/dL Final         ASSESSMENT & PLAN:  Serenity Villegas is a very pleasant 67 y.o. female with newly diagnosed limited stage small cell lung cancer.    1.  Limited stage small cell lung cancer:  - Recommended concurrent chemotherapy and radiation with cisplatin and etoposide.  We discussed potential risks benefits and side effects and she decided to proceed.  - She has completed 3 cycles of Cis/Etoposide to date and completed concurrent XRT on 11/4/2022.  - She has struggled with decreased PO intake due to esophageal toxicity from radiation (see below).  -We will continue to work with supportive care to improve her circumstances.  - Cycle 4 chemotherapy is due this week.  We will hold this for least a week.    2.  Weight loss / poor po intake / esophageal pain/ nausea:  -She is ordered oxycodone 5 mg 1-3 tabs q4h as needed to control pain.  She says she has been taking 1 tablet about twice a day.  This may not be sufficient to control her pain to allow her to eat.  Recommended she try to increase use as she can.  We will also give Carafate 1 g of the liquid formulation 4 times daily to see if coating the mucosa may help her to eat.  - Strongly encouraged her to increase oral intake, even if she does this with nutritional supplements initially.    -Continue Marinol 2.5 mg twice daily.  Continue Zofran, but increase to every 8 hours.  Restart Reglan as needed.  - Continue Nexium at 40 mg twice daily.  - Will give 2 L NS tomorrow along with Zofran 16 mg IV, Pepcid 20 mg IV and 40 mill equivalents IV potassium.  -Hold treatment to allow her to recover.     3. Thrombocytopenia/ Neutropenia:  - Significantly improved today.  Discontinue Levaquin.    4.  Hypokalemia:  - We will give 40 mill equivalents IV tomorrow.  - We will try to replace the rest with liquid formulation oral potassium.  We will give  40 mill equivalents twice daily for 2 days and then 20 mill equivalents twice daily for 2 days and then stop    5.  Prophylaxis:  -Recommended 2022 flu vaccine.   - Received Prevnar 20 vaccine  - She has not had COVID vaccinations. Recommended that she get this completed.  Could also consider Evusheld treatment.    6.  Follow-up:  - Continue supportive care to help her recover  - Hold chemotherapy for at least 1 week.  - 2 L normal saline with Zofran 16 mg IV, Pepcid 20 mg IV and potassium 40 mill equivalents IV tomorrow  - Oral potassium replacement as above.  - Start sucralfate 1 g 4 times daily to help coat her mucosa and see if this can help with pain control.  - Liberalize oxycodone use as well as antiemetic use to help with oral intake  - Return to see me in 1 week with CBC and CMP    7.  ACO / BALJIT/Other  Quality measures  -  Serenity Villegas did not receive 2022 flu vaccine.  This has been recommended to her  -  Serenity Villegas reports a pain score of 0.  Given her pain assessment as noted, treatment options were discussed and the following options were decided upon as a follow-up plan to address the patient's pain: No intervention needed at this time.  -  Current outpatient and discharge medications have been reconciled for the patient.  Reviewed by: Rhianna Wood MD      I spent 40 minutes with Serenity Villegas today.  In the office today, more than 50% of this time was spent face-to-face with her  in counseling / coordination of care, reviewing her medical history and counseling on the current treatment plan.  All questions were answered to her satisfaction      Electronically Signed by: Rhianna Wood MD

## 2022-11-28 NOTE — PROGRESS NOTES
NAME: Serenity Villegas    : 1955    DATE:  2022     DIAGNOSIS:  Limited Stage Small Cell Lung Cancer     CHIEF COMPLAINT:  Follow up SCLC/Toxicity check    TREATMENT:  1.        2. Concurrent radiation to Right Lung delivered by Dr. Spain between 22 and 22    HISTORY OF PRESENT ILLNESS:   Serenity Villegas is a very pleasant 67 y.o. female who is being seen today in the presence of her son at the request of Dr. Moy Murrieta for evaluation and treatment of newly diagnosed lung cancer.  Ms. Villegas is quite anxious today.  She says that s she initially began to feel poorly approximately 6 weeks ago.  She started to feel short of breath, initially at night, but then progressively worse during the day as well, and says she noticed a rattling sensation in her chest.  She denies any chest pain, but says that she noticed her activity began to be affected.  She would feel very short of breath with minimal exertion and started to have trouble picking up her 2-year-old grandson.  Her son took her to a new primary care physician who promptly ordered CT imaging which was abnormal.  This led to her seeing Dr. Murrieta, and she underwent bronchoscopy on 2022.  Biopsies and BAL from the RML as well as 12 R and 7 lymph node stations all revealed small cell lung cancer.  PET/CT done 2022 revealed a central masslike process involving the right hilum centered around the right middle lobe mainstem bronchus which was approximately 4.3 cm in dimension (SUV 7.8) as well as a right suprahilar lymph node which was 2.5 cm in diameter with mass-effect on the right mainstem bronchus.  There was also an 11 mm nodule in the right lower lobe which did not show FDG hypermetabolism.  There was no evidence of distant metastatic disease.  She was referred here for further evaluation and treatment.    Ms. Villegas notes that she has lost approximately 4 pounds over the last several weeks.  Her appetite has been decent,  but she does notice that she has mild nausea over the last several weeks.  She occasionally vomits, but not routinely.  She denies significant chest pain, but does occasionally get twinges of pain.  She complains of shortness of breath especially with activity and cough productive of clear sputum.  She denies abdominal pain.  She denies diarrhea or constipation.  She chronically gets headaches (maybe once to twice a month).  This is typical for her and she has a history of what sounds like classical migraine which has been going on for several years.  These headaches are not changed.  She denies neurologic symptoms.    INTERVAL HISTORY:  Ms. Villegas presents today with her son and daughter for follow up of small cell lung cancer.  She completed 3 cycles of cisplatin and etoposide, last 10/28/2022, and she completed concurrent radiation on 11/4/2022.  Since her last visit, she is continued to struggle.  She sleeps much of the time, and has been sipping on a little water but not eating or drinking now for several weeks.  She says when she eats it comes back up.  When asked if she was taking her medication she said no.  She has not been using Marinol.  She rarely takes Zofran.  She has not been taking her proton pump inhibitor.    PAST MEDICAL HISTORY:  Past Medical History:   Diagnosis Date   • Arthritis    • Cancer (HCC)    • Coronary artery disease    • Heart disease    • Heartburn    • Hyperlipidemia    • Hypertension    • Right lower lobe lung mass        PAST SURGICAL HISTORY:  Past Surgical History:   Procedure Laterality Date   • BRONCHOSCOPY N/A 8/22/2022    Procedure: BRONCHOSCOPY WITH ENDOBRONCHIAL ULTRASOUND;  Surgeon: Amari Murrieta MD;  Location: [facility];  Service: Pulmonary;  Laterality: N/A;   • CARDIAC SURGERY      3 VESELS 2002   [facility]   • HYSTERECTOMY      arh   • PACEMAKER IMPLANTATION      [facility]   • VENOUS ACCESS DEVICE (PORT) INSERTION N/A 9/6/2022    Procedure: INSERTION  "VENOUS ACCESS DEVICE;  Surgeon: Ting Sarah MD;  Location: [facility];  Service: General;  Laterality: N/A;   Hysterectomy performed for endometriosis (benign) \"a long time ago\"    FAMILY HISTORY:  Family History   Problem Relation Age of Onset   • Cancer Mother    • Heart disease Father    • Heart attack Father    Mother  with melanoma  Maternal grandmother  of unknown type malignancy    SOCIAL HISTORY:  Social History     Socioeconomic History   • Marital status:    Tobacco Use   • Smoking status: Every Day     Packs/day: 0.25     Years: 20.00     Pack years: 5.00     Types: Cigarettes   • Smokeless tobacco: Never   • Tobacco comments:     1  pack per day history   Vaping Use   • Vaping Use: Never used   Substance and Sexual Activity   • Alcohol use: Never   • Drug use: Never   • Sexual activity: Defer   Ms. Villegas lives alone.  Her son accompanies her to her visit today and is very supportive.  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  No unusual chemical exposures.  She is a smoker and reports she currently smokes about half pack per day      REVIEW OF SYSTEMS:   A comprehensive 14 point review of systems was performed.  Significant findings as mentioned above.  All other systems reviewed and are negative.      MEDICATIONS:  The current medication list was reviewed in the EMR    Current Outpatient Medications:   •  albuterol sulfate  (90 Base) MCG/ACT inhaler, , Disp: , Rfl:   •  allopurinol (ZYLOPRIM) 300 MG tablet, Take 1 tablet by mouth Daily., Disp: 30 tablet, Rfl: 3  •  Anoro Ellipta 62.5-25 MCG/INH aerosol powder  inhaler, , Disp: , Rfl:   •  atenolol (TENORMIN) 50 MG tablet, , Disp: , Rfl:   •  atorvastatin (LIPITOR) 40 MG tablet, , Disp: , Rfl:   •  benazepril (LOTENSIN) 20 MG tablet, , Disp: , Rfl:   •  Cyanocobalamin (Vitamin B-12) 500 MCG sublingual tablet, Place 500 mcg under the tongue Daily., Disp: 30 tablet, Rfl: 5  •  Diclofenac Sodium (VOLTAREN) 1 % gel " gel, , Disp: , Rfl:   •  dronabinol (Marinol) 2.5 MG capsule, Take 1 capsule by mouth 2 (Two) Times a Day Before Meals., Disp: 60 capsule, Rfl: 0  •  escitalopram (LEXAPRO) 10 MG tablet, Take 1 tablet by mouth Daily., Disp: 30 tablet, Rfl: 1  •  esomeprazole (nexIUM) 40 MG capsule, Take 1 capsule by mouth 2 (Two) Times a Day., Disp: 60 capsule, Rfl: 3  •  levocetirizine (XYZAL) 5 MG tablet, , Disp: , Rfl:   •  levoFLOXacin (Levaquin) 500 MG tablet, Take 1 tablet by mouth Daily for 10 days., Disp: 10 tablet, Rfl: 0  •  lidocaine-prilocaine (EMLA) 2.5-2.5 % cream, Apply to Port-A-Cath site 30 minutes prior to arrival at infusion clinic., Disp: 30 g, Rfl: 1  •  loratadine (CLARITIN) 10 MG tablet, Take 1 tablet by mouth Daily., Disp: 30 tablet, Rfl: 1  •  magic mouthwash oral suspension, Swish and swallow 10 mL Every 4 (Four) Hours As Needed for Mucositis., Disp: 1 each, Rfl: 3  •  metoclopramide (REGLAN) 10 MG tablet, Take 1 tablet by mouth 4 (Four) Times a Day., Disp: 60 tablet, Rfl: 1  •  montelukast (SINGULAIR) 10 MG tablet, , Disp: , Rfl:   •  OLANZapine (zyPREXA) 5 MG tablet, Take one tablet at HS on days 2, 3 and 4 after chemotherapy., Disp: 3 tablet, Rfl: 4  •  ondansetron (ZOFRAN) 8 MG tablet, Take 1 tablet by mouth Every 8 (Eight) Hours As Needed for Nausea or Vomiting., Disp: 30 tablet, Rfl: 1  •  ondansetron ODT (Zofran ODT) 8 MG disintegrating tablet, Place 1 tablet on the tongue Every 8 (Eight) Hours As Needed for Nausea or Vomiting., Disp: 30 tablet, Rfl: 1  •  oxyCODONE (Roxicodone) 5 MG immediate release tablet, Take 1 to 3 tablets by mouth every 4 hours as needed for pain., Disp: 180 tablet, Rfl: 0  •  sennosides-docusate (senna-docusate sodium) 8.6-50 MG per tablet, Take 2 tablets by mouth 2 (Two) Times a Day., Disp: 120 tablet, Rfl: 0    ALLERGIES:  No Known Allergies    PHYSICAL EXAM:  Vitals:    11/29/22 0834   BP: 119/81   Pulse: 118   Resp: 18   Temp: 97.1 °F (36.2 °C)   TempSrc: Temporal   SpO2:  96%   Weight: 42.4 kg (93 lb 6.4 oz)   PainSc:   3   PainLoc: Abdomen     General:  Awake, alert and oriented.  Appears fatigued.  Flat affect.  Wearing pjs  HEENT:  Pupils are equal, round and reactive to light and accommodation, Extra-ocular movements full, Oropharyx clear, mucous membranes dry  Neck:  No JVD, thyromegaly or lymphadenopathy  CV: Regular tachycardia, no murmurs, rubs or gallops  Resp: Lungs are clear to auscultation bilaterally, no wheezing  Abd:  Soft, non-tender, non-distended, bowel sounds present, no organomegaly or masses  Ext:  No clubbing, cyanosis, or edema  Lymph:  No cervical, supraclavicular, axillary adenopathy.  Neuro:  MS as above, grossly nonfocal exam.    PATHOLOGY:    08/22/2022            ENDOSCOPY:    Bronchoscopy: Dr. Murrieta 8/22/2022  Operation: Flexible fiberoptic bronchoscopy     Findings: Tumor emanating from the right middle lobe     Specimen(s): Endobronchial biopsies right middle lobe.  BAL right middle lobe.  Cytology brush right middle lobe.  Transbronchial needle aspiration station 12 R and station 7     Estimated Blood Loss: Minimal/None     Complications: No immediate      IMAGING:    CT Chest with Contrast 08/10/22      NM PET/CT Skull Base to Mid Thigh (08/24/2022 11:30)  FINDINGS:      HEAD:    Brain:  Unremarkable as visualized.    Nasopharynx:  Unremarkable.      NECK:    Hypopharynx:  Unremarkable.    Larynx:  Unremarkable.    Trachea:  Unremarkable.    Retropharyngeal space:  Unremarkable.    Submandibular/parotid glands:  Unremarkable.  Glands are normal in  size.    Thyroid:  Unremarkable.  No enlarged or calcified nodules.      CHEST:    Lungs:  Central masslike process involving the right hilum centered  around the right middle lobe main stem bronchus that may represent a  sharath conglomeration or a primary mass and is 4.3 cm. FDG  hypermetabolism is noted with maximum SUV: 7.8.  11 mm nodule right  lower lobe shows no FDG hypermetabolism.  Emphysema is  noted.    Pleural space:  Unremarkable.  No significant effusion.  No  pneumothorax.    Heart:  Cardiomegaly.  CABG.  No significant pericardial effusion.    Mediastinum:  Unremarkable.  No mass.      ABDOMEN:    Liver:  Unremarkable.    Gallbladder and bile ducts:  Unremarkable.  No calcified stones.  No  ductal dilation.    Pancreas:  Unremarkable.  No ductal dilation.    Spleen:  Unremarkable.  No splenomegaly.    Adrenals:  Unremarkable.  No mass.    Kidneys and ureters:  Atrophy right kidney.  Compensatory hypertrophy  left kidney.  Nonobstructing left kidney stones.    Stomach and bowel:  Sigmoid diverticulosis without evidence of acute  diverticulitis.  No obstruction.      PELVIS:    Appendix:  No findings to suggest acute appendicitis.    Bladder:  Unremarkable.    Reproductive:  Unremarkable as visualized.      WHOLE BODY:    Intraperitoneal space:  Unremarkable.  No significant fluid  collection.  No free air.    Bones/joints:  Unremarkable.  No acute fracture.  No dislocation.    Soft tissues:  Unremarkable.    Vasculature:  Advanced atherosclerosis of the aortoiliac vasculature.   No aortic aneurysm.    Lymph nodes:  Enlarged right suprahilar lymph node or primary mass is  noted that is approximately 2.5 cm and produces mass effect on the right  mainstem bronchus. FDG hypermetabolism is noted. Maximum SUV: 8.8.     IMPRESSION:  1.  Central masslike process involving the right hilum centered around  the right middle lobe main stem bronchus that may represent a sharath  conglomeration or a primary mass and is 4.3 cm. FDG hypermetabolism is  noted with maximum SUV: 7.8.  2.  Enlarged right suprahilar lymph node or primary mass is noted that  is approximately 2.5 cm and produces mass effect on the right mainstem  bronchus. FDG hypermetabolism is noted. Maximum SUV: 8.8.  3.  11 mm nodule right lower lobe shows no FDG hypermetabolism.  4. Other incidental and nonacute findings as above.    CT Head With  Contrast (09/09/2022 12:10)  FINDINGS:   No acute intracranial abnormality. No midline shift or mass  effect. No hydrocephalus or intracranial hemorrhage. There is no CT  evidence of acute vascular territory infarct.  No pathologic contrast  enhancement identified. No enhancing extra-axial fluid collections. No  enhancing brain parenchymal lesions. Bone windows show no acute osseous  abnormality.     IMPRESSION:  1. No acute intracranial abnormality.  2. No pathologic contrast enhancement.        RECENT LABS:  Lab Results   Component Value Date    WBC 9.62 11/29/2022    HGB 11.2 (L) 11/29/2022    HCT 33.1 (L) 11/29/2022    MCV 93.5 11/29/2022    RDW 19.4 (H) 11/29/2022     11/29/2022    NEUTRORELPCT 85.2 (H) 11/29/2022    LYMPHORELPCT 5.8 (L) 11/29/2022    MONORELPCT 7.3 11/29/2022    EOSRELPCT 0.1 (L) 11/29/2022    BASORELPCT 0.2 11/29/2022    NEUTROABS 8.20 (H) 11/29/2022    LYMPHSABS 0.56 (L) 11/29/2022     Lab Results   Component Value Date     11/29/2022    K 3.1 (L) 11/29/2022    CO2 24.4 11/29/2022    CL 89 (L) 11/29/2022    BUN 28 (H) 11/29/2022    CREATININE 0.76 11/29/2022    GLUCOSE 138 (H) 11/29/2022    CALCIUM 10.0 11/29/2022    ALKPHOS 67 11/29/2022    AST 20 11/29/2022    ALT 10 11/29/2022    BILITOT 0.7 11/29/2022    ALBUMIN 3.93 11/29/2022    PROTEINTOT 7.0 11/29/2022    MG 2.1 10/28/2022     Lab Results   Component Value Date    FERRITIN 438.50 (H) 10/19/2022    IRON 54 10/19/2022    TIBC 270 (L) 10/19/2022    LABIRON 20 10/19/2022    SKTZMYCA41 1,220 (H) 10/19/2022    FOLATE 9.98 10/19/2022     Lab Results   Component Value Date    TSH 0.638 10/19/2022     LDH   Date Value Ref Range Status   09/14/2022 264 (H) 135 - 214 U/L Final     Uric Acid   Date Value Ref Range Status   09/14/2022 2.1 (L) 2.4 - 5.7 mg/dL Final       ASSESSMENT & PLAN:  Serenity Villegas is a very pleasant 67 y.o. female with  limited stage small cell lung cancer.    1.  Limited stage small cell lung cancer:  -  Recommended concurrent chemotherapy and radiation with cisplatin and etoposide.  We discussed potential risks benefits and side effects and she decided to proceed.  - She has completed 3 cycles of Cis/Etoposide to date and completed concurrent XRT on 11/4/2022.  - She has struggled with decreased PO intake due to esophageal toxicity from radiation (see below).  -  She has had a very slow recovery and has been refusing to take antiemetics or to eat/drink.  We discussed the importance of this.  She says she will try to do better.  -  Will d/c chemotherapy and forego last cycle given her current state.  -  Will work with her on recovery as below.  -  We discussed repeat imaging but she wants to hold off on this for a bit.    2.  Weight loss / poor po intake / esophageal pain/ nausea:  -  She says she no longer has pain.  -  Encouraged to take Marinol 2.5 mg BID, Zofran 8 mg q8h and Nexium 40 mEq BId  -  Will give 2L NS today along with 20 mEq KCL, 16 mg IV zofran and 20 mg IV pepcid.  -  She will replace KCL with IV as above along with 20 mEq TID x 2d and then BID x 2 more days.    3.  Pancytopenia  - WBC, plts have normalized.  -  Hb has increased quite a bit in the last couple of weeks and is almost normal.    4.   Prophylaxis:  -Recommended 2022 flu vaccine.   - Received Prevnar 20 vaccine  - She has not had COVID vaccinations. Recommended that she get this completed.     5. Depression:  -  Previously prescribed Lexapro 10 mg daily but she never took it.  She is willing to try it.  Will start.    6.   Follow-up:  -  IVF , electrolytes, antiemetics today as above  -  KCL replacement as above  -  Start LExapro  -  Schedule Marinol, Zofran, Nexium  -   Strongly encouraged to start eating.  -  Return to see me in about 2 weeks. Will consider restaging imaging at that time.     7.  ACO / BALJIT/Other  Quality measures  -  Serenity Villegas did not receive 2022 flu vaccine.  This has been recommended to her  -  Serenity Villegas  reports a pain score of 0.  Given her pain assessment as noted, treatment options were discussed and the following options were decided upon as a follow-up plan to address the patient's pain: No intervention needed at this time.  -  Current outpatient and discharge medications have been reconciled for the patient.  Reviewed by: Rhianna Wood MD      I spent 35 minutes with Serenity Villegas today.  In the office today, more than 50% of this time was spent face-to-face with her  in counseling / coordination of care, reviewing her medical history and counseling on the current treatment plan.  All questions were answered to her satisfaction      ADDENDUM:  Ms. Villegas has been generally weak and less steady on her feet.  With long distances like coming in to the office today, her family brings her in a wheelchair.  She is also unsteady at home however.  I recommended we get her a rolator walker to help with stability to help prevent falls.  She is agreeable.  Will place order.       Electronically Signed by: Rhianna Wood MD

## 2022-11-29 ENCOUNTER — LAB (OUTPATIENT)
Dept: ONCOLOGY | Facility: HOSPITAL | Age: 67
End: 2022-11-29

## 2022-11-29 ENCOUNTER — INFUSION (OUTPATIENT)
Dept: ONCOLOGY | Facility: HOSPITAL | Age: 67
End: 2022-11-29

## 2022-11-29 ENCOUNTER — TELEPHONE (OUTPATIENT)
Dept: ONCOLOGY | Facility: CLINIC | Age: 67
End: 2022-11-29

## 2022-11-29 ENCOUNTER — OFFICE VISIT (OUTPATIENT)
Dept: ONCOLOGY | Facility: CLINIC | Age: 67
End: 2022-11-29

## 2022-11-29 VITALS
HEART RATE: 118 BPM | OXYGEN SATURATION: 96 % | WEIGHT: 93.4 LBS | RESPIRATION RATE: 18 BRPM | TEMPERATURE: 97.1 F | SYSTOLIC BLOOD PRESSURE: 119 MMHG | BODY MASS INDEX: 16.03 KG/M2 | DIASTOLIC BLOOD PRESSURE: 81 MMHG

## 2022-11-29 VITALS
WEIGHT: 96.1 LBS | BODY MASS INDEX: 16.5 KG/M2 | HEART RATE: 118 BPM | OXYGEN SATURATION: 96 % | DIASTOLIC BLOOD PRESSURE: 81 MMHG | SYSTOLIC BLOOD PRESSURE: 119 MMHG | TEMPERATURE: 97.1 F | RESPIRATION RATE: 18 BRPM

## 2022-11-29 VITALS
TEMPERATURE: 97.1 F | SYSTOLIC BLOOD PRESSURE: 119 MMHG | WEIGHT: 96.1 LBS | RESPIRATION RATE: 18 BRPM | DIASTOLIC BLOOD PRESSURE: 81 MMHG | HEART RATE: 118 BPM | OXYGEN SATURATION: 96 % | BODY MASS INDEX: 16.5 KG/M2

## 2022-11-29 DIAGNOSIS — D69.6 THROMBOCYTOPENIA: ICD-10-CM

## 2022-11-29 DIAGNOSIS — C34.90 SMALL CELL LUNG CANCER: Primary | ICD-10-CM

## 2022-11-29 DIAGNOSIS — Z95.828 PORT-A-CATH IN PLACE: Primary | ICD-10-CM

## 2022-11-29 DIAGNOSIS — R11.2 NAUSEA AND VOMITING, UNSPECIFIED VOMITING TYPE: ICD-10-CM

## 2022-11-29 DIAGNOSIS — E87.6 HYPOKALEMIA: ICD-10-CM

## 2022-11-29 DIAGNOSIS — R63.0 ANOREXIA: ICD-10-CM

## 2022-11-29 DIAGNOSIS — C34.90 SMALL CELL LUNG CANCER: ICD-10-CM

## 2022-11-29 DIAGNOSIS — R63.0 POOR APPETITE: ICD-10-CM

## 2022-11-29 DIAGNOSIS — E86.0 DEHYDRATION: ICD-10-CM

## 2022-11-29 LAB
ALBUMIN SERPL-MCNC: 3.93 G/DL (ref 3.5–5.2)
ALBUMIN/GLOB SERPL: 1.3 G/DL
ALP SERPL-CCNC: 67 U/L (ref 39–117)
ALT SERPL W P-5'-P-CCNC: 10 U/L (ref 1–33)
ANION GAP SERPL CALCULATED.3IONS-SCNC: 23.6 MMOL/L (ref 5–15)
AST SERPL-CCNC: 20 U/L (ref 1–32)
BASOPHILS # BLD AUTO: 0.02 10*3/MM3 (ref 0–0.2)
BASOPHILS NFR BLD AUTO: 0.2 % (ref 0–1.5)
BILIRUB SERPL-MCNC: 0.7 MG/DL (ref 0–1.2)
BUN SERPL-MCNC: 28 MG/DL (ref 8–23)
BUN/CREAT SERPL: 36.8 (ref 7–25)
CALCIUM SPEC-SCNC: 10 MG/DL (ref 8.6–10.5)
CHLORIDE SERPL-SCNC: 89 MMOL/L (ref 98–107)
CO2 SERPL-SCNC: 24.4 MMOL/L (ref 22–29)
CREAT SERPL-MCNC: 0.76 MG/DL (ref 0.57–1)
DEPRECATED RDW RBC AUTO: 65.1 FL (ref 37–54)
EGFRCR SERPLBLD CKD-EPI 2021: 87.1 ML/MIN/1.73
EOSINOPHIL # BLD AUTO: 0.01 10*3/MM3 (ref 0–0.4)
EOSINOPHIL NFR BLD AUTO: 0.1 % (ref 0.3–6.2)
ERYTHROCYTE [DISTWIDTH] IN BLOOD BY AUTOMATED COUNT: 19.4 % (ref 12.3–15.4)
GLOBULIN UR ELPH-MCNC: 3.1 GM/DL
GLUCOSE SERPL-MCNC: 138 MG/DL (ref 65–99)
HCT VFR BLD AUTO: 33.1 % (ref 34–46.6)
HGB BLD-MCNC: 11.2 G/DL (ref 12–15.9)
IMM GRANULOCYTES # BLD AUTO: 0.13 10*3/MM3 (ref 0–0.05)
IMM GRANULOCYTES NFR BLD AUTO: 1.4 % (ref 0–0.5)
LYMPHOCYTES # BLD AUTO: 0.56 10*3/MM3 (ref 0.7–3.1)
LYMPHOCYTES NFR BLD AUTO: 5.8 % (ref 19.6–45.3)
MCH RBC QN AUTO: 31.6 PG (ref 26.6–33)
MCHC RBC AUTO-ENTMCNC: 33.8 G/DL (ref 31.5–35.7)
MCV RBC AUTO: 93.5 FL (ref 79–97)
MONOCYTES # BLD AUTO: 0.7 10*3/MM3 (ref 0.1–0.9)
MONOCYTES NFR BLD AUTO: 7.3 % (ref 5–12)
NEUTROPHILS NFR BLD AUTO: 8.2 10*3/MM3 (ref 1.7–7)
NEUTROPHILS NFR BLD AUTO: 85.2 % (ref 42.7–76)
NRBC BLD AUTO-RTO: 0.4 /100 WBC (ref 0–0.2)
PLATELET # BLD AUTO: 278 10*3/MM3 (ref 140–450)
PMV BLD AUTO: 10.2 FL (ref 6–12)
POTASSIUM SERPL-SCNC: 3.1 MMOL/L (ref 3.5–5.2)
PROT SERPL-MCNC: 7 G/DL (ref 6–8.5)
RBC # BLD AUTO: 3.54 10*6/MM3 (ref 3.77–5.28)
SODIUM SERPL-SCNC: 137 MMOL/L (ref 136–145)
WBC NRBC COR # BLD: 9.62 10*3/MM3 (ref 3.4–10.8)

## 2022-11-29 PROCEDURE — 80053 COMPREHEN METABOLIC PANEL: CPT

## 2022-11-29 PROCEDURE — 0 POTASSIUM CHLORIDE 10 MEQ/100ML SOLUTION: Performed by: INTERNAL MEDICINE

## 2022-11-29 PROCEDURE — 99214 OFFICE O/P EST MOD 30 MIN: CPT | Performed by: INTERNAL MEDICINE

## 2022-11-29 PROCEDURE — 25010000002 ONDANSETRON PER 1 MG: Performed by: INTERNAL MEDICINE

## 2022-11-29 PROCEDURE — 96375 TX/PRO/DX INJ NEW DRUG ADDON: CPT

## 2022-11-29 PROCEDURE — 96365 THER/PROPH/DIAG IV INF INIT: CPT

## 2022-11-29 PROCEDURE — 96368 THER/DIAG CONCURRENT INF: CPT

## 2022-11-29 PROCEDURE — 25010000002 HEPARIN LOCK FLUSH PER 10 UNITS: Performed by: INTERNAL MEDICINE

## 2022-11-29 PROCEDURE — 96366 THER/PROPH/DIAG IV INF ADDON: CPT

## 2022-11-29 PROCEDURE — 85025 COMPLETE CBC W/AUTO DIFF WBC: CPT

## 2022-11-29 RX ORDER — POTASSIUM CHLORIDE 7.45 MG/ML
10 INJECTION INTRAVENOUS
Status: COMPLETED | OUTPATIENT
Start: 2022-11-29 | End: 2022-11-29

## 2022-11-29 RX ORDER — HEPARIN SODIUM (PORCINE) LOCK FLUSH IV SOLN 100 UNIT/ML 100 UNIT/ML
500 SOLUTION INTRAVENOUS AS NEEDED
Status: DISCONTINUED | OUTPATIENT
Start: 2022-11-29 | End: 2022-11-29 | Stop reason: HOSPADM

## 2022-11-29 RX ORDER — HEPARIN SODIUM (PORCINE) LOCK FLUSH IV SOLN 100 UNIT/ML 100 UNIT/ML
500 SOLUTION INTRAVENOUS AS NEEDED
Status: CANCELLED | OUTPATIENT
Start: 2022-11-29

## 2022-11-29 RX ORDER — FAMOTIDINE 10 MG/ML
20 INJECTION, SOLUTION INTRAVENOUS ONCE
Status: COMPLETED | OUTPATIENT
Start: 2022-11-29 | End: 2022-11-29

## 2022-11-29 RX ORDER — SODIUM CHLORIDE 0.9 % (FLUSH) 0.9 %
10 SYRINGE (ML) INJECTION AS NEEDED
Status: CANCELLED | OUTPATIENT
Start: 2022-11-29

## 2022-11-29 RX ORDER — SODIUM CHLORIDE 0.9 % (FLUSH) 0.9 %
10 SYRINGE (ML) INJECTION AS NEEDED
Status: DISCONTINUED | OUTPATIENT
Start: 2022-11-29 | End: 2022-11-29 | Stop reason: HOSPADM

## 2022-11-29 RX ORDER — ESCITALOPRAM OXALATE 10 MG/1
10 TABLET ORAL DAILY
Qty: 30 TABLET | Refills: 5 | Status: SHIPPED | OUTPATIENT
Start: 2022-11-29

## 2022-11-29 RX ADMIN — POTASSIUM CHLORIDE 10 MEQ: 7.46 INJECTION, SOLUTION INTRAVENOUS at 10:07

## 2022-11-29 RX ADMIN — POTASSIUM CHLORIDE 10 MEQ: 7.46 INJECTION, SOLUTION INTRAVENOUS at 11:07

## 2022-11-29 RX ADMIN — Medication 10 ML: at 12:20

## 2022-11-29 RX ADMIN — ONDANSETRON 16 MG: 2 INJECTION INTRAMUSCULAR; INTRAVENOUS at 10:07

## 2022-11-29 RX ADMIN — SODIUM CHLORIDE 1000 ML: 9 INJECTION, SOLUTION INTRAVENOUS at 10:07

## 2022-11-29 RX ADMIN — HEPARIN 500 UNITS: 100 SYRINGE at 12:20

## 2022-11-29 RX ADMIN — SODIUM CHLORIDE 1000 ML: 9 INJECTION, SOLUTION INTRAVENOUS at 11:10

## 2022-11-29 RX ADMIN — FAMOTIDINE 20 MG: 10 INJECTION INTRAVENOUS at 10:05

## 2022-11-29 NOTE — TELEPHONE ENCOUNTER
Caller: BEATRIZ    Relationship: Other - [facility]    Best call back number: [phone] EXT [phone]    What is the best time to reach you: ANYTIME    Who are you requesting to speak with (clinical staff, provider,  specific staff member): DR FITZGERALD OR NURSE    What was the call regarding: BEATRIZ STATED THAT THEY NEED AN ORDER FOR PT TO GET A ROLLATOR WALKER. THEY ALSO NEED THIS NOTED IN THE OFFICE NOTE AND THAT SENT AS WELL FOR MEDICARE TO COVER. PLEASE FAX [phone].    Do you require a callback: CAN CALL WITH ANY QUESTIONS.

## 2022-11-30 NOTE — TELEPHONE ENCOUNTER
Caller: BEATRIZ    Relationship to patient: Other    Best call back number: [phone] EXT [phone]    BEATRIZ CALLED AND STATED SHE RECEIVED THE NOTES. HOWEVER, THE NOTE NEEDS TO STATE PATIENT IS BEING PRESCRIBED THE ROLATOR WALKER. OTHERWISE, MEDICARE WILL NOT APPROVE IT.    PLEASE FAX UPDATED/ADDENDED NOTE [phone].

## 2022-12-09 DIAGNOSIS — C34.90 SMALL CELL LUNG CANCER: Primary | ICD-10-CM

## (undated) DEVICE — DRAPE,T,LAPARO,TRANS,STERILE: Brand: MEDLINE

## (undated) DEVICE — DRAPE,UTILTY,TAPE,15X26, 4EA/PK: Brand: MEDLINE

## (undated) DEVICE — Device

## (undated) DEVICE — Device: Brand: SINGLE USE ASPIRATION NEEDLE NA-U401SX

## (undated) DEVICE — SINGLE USE SUCTION VALVE MAJ-209: Brand: SINGLE USE SUCTION VALVE (STERILE)

## (undated) DEVICE — BITEBLOCK 1P/U

## (undated) DEVICE — FRCP BX RADJAW4 PULM WO NDL STD1.8X2 100

## (undated) DEVICE — APPL CHLORAPREP HI/LITE 26ML ORNG

## (undated) DEVICE — SYR LUER SLPTP 50ML

## (undated) DEVICE — HOLDER: Brand: DEROYAL

## (undated) DEVICE — SYR LUERLOK 5CC

## (undated) DEVICE — ELECTRD NDL MEGADYNE EZCLEAN NOSE 7CM

## (undated) DEVICE — SUT PROLN 3/0 8832H

## (undated) DEVICE — DECANTER BAG 9": Brand: MEDLINE INDUSTRIES, INC.

## (undated) DEVICE — TBG PENCL TELESCP MEGADYNE SMOKE EVAC 10FT

## (undated) DEVICE — SINGLE USE BIOPSY VALVE MAJ-210: Brand: SINGLE USE BIOPSY VALVE (STERILE)

## (undated) DEVICE — AMD ANTIMICROBIAL NON-ADHERENT ISLAND DRESSING,0.2% POLYHEXAMETHYLENE BIGUANIDE HCI (PHMB): Brand: TELFA

## (undated) DEVICE — SUT VIC 3/0 SH 27IN J416H

## (undated) DEVICE — ADAPT SWVL FIBROPTIC BRONCH

## (undated) DEVICE — GLV SURG PREMIERPRO MIC LTX PF SZ7 BRN

## (undated) DEVICE — PATIENT RETURN ELECTRODE, SINGLE-USE, CONTACT QUALITY MONITORING, ADULT, WITH 9FT CORD, FOR PATIENTS WEIGING OVER 33LBS. (15KG): Brand: MEGADYNE

## (undated) DEVICE — SYR LUERLOK 30CC

## (undated) DEVICE — DRP C/ARM W/BAND W/CLIPS 41X74IN

## (undated) DEVICE — TUBING, SUCTION, 1/4" X 20', STRAIGHT: Brand: MEDLINE INDUSTRIES, INC.

## (undated) DEVICE — PK BASIC 70

## (undated) DEVICE — ELECTRD BLD EZ CLN MOD 4IN

## (undated) DEVICE — BRUSH CYTO BRONCOSCOPE

## (undated) DEVICE — SUT VIC 4/0 P3 18IN J494G

## (undated) DEVICE — GOWN,REINF,POLY,ECL,PP SLV,XL: Brand: MEDLINE